# Patient Record
Sex: FEMALE | Race: BLACK OR AFRICAN AMERICAN | NOT HISPANIC OR LATINO | Employment: UNEMPLOYED | RURAL
[De-identification: names, ages, dates, MRNs, and addresses within clinical notes are randomized per-mention and may not be internally consistent; named-entity substitution may affect disease eponyms.]

---

## 2017-08-29 ENCOUNTER — HISTORICAL (OUTPATIENT)
Dept: ADMINISTRATIVE | Facility: HOSPITAL | Age: 42
End: 2017-08-29

## 2017-09-06 LAB
LAB AP CLINICAL INFORMATION: NORMAL
LAB AP COMMENTS: NORMAL
LAB AP GENERAL CAT - HISTORICAL: NORMAL
LAB AP INTERPRETATION/RESULT - HISTORICAL: NEGATIVE
LAB AP SPECIMEN ADEQUACY - HISTORICAL: NORMAL
LAB AP SPECIMEN SUBMITTED - HISTORICAL: NORMAL

## 2019-04-24 ENCOUNTER — HISTORICAL (OUTPATIENT)
Dept: ADMINISTRATIVE | Facility: HOSPITAL | Age: 44
End: 2019-04-24

## 2019-04-26 LAB
LAB AP CLINICAL INFORMATION: NORMAL
LAB AP GENERAL CAT - HISTORICAL: NORMAL
LAB AP INTERPRETATION/RESULT - HISTORICAL: NEGATIVE
LAB AP SPECIMEN ADEQUACY - HISTORICAL: NORMAL
LAB AP SPECIMEN SUBMITTED - HISTORICAL: NORMAL

## 2020-05-19 ENCOUNTER — HISTORICAL (OUTPATIENT)
Dept: ADMINISTRATIVE | Facility: HOSPITAL | Age: 45
End: 2020-05-19

## 2020-07-30 ENCOUNTER — HISTORICAL (OUTPATIENT)
Dept: ADMINISTRATIVE | Facility: HOSPITAL | Age: 45
End: 2020-07-30

## 2020-09-02 ENCOUNTER — HISTORICAL (OUTPATIENT)
Dept: ADMINISTRATIVE | Facility: HOSPITAL | Age: 45
End: 2020-09-02

## 2020-09-29 ENCOUNTER — HISTORICAL (OUTPATIENT)
Dept: ADMINISTRATIVE | Facility: HOSPITAL | Age: 45
End: 2020-09-29

## 2020-10-20 ENCOUNTER — HISTORICAL (OUTPATIENT)
Dept: ADMINISTRATIVE | Facility: HOSPITAL | Age: 45
End: 2020-10-20

## 2020-10-20 LAB
ALBUMIN SERPL BCP-MCNC: 3.9 G/DL (ref 3.5–5)
ALBUMIN/GLOB SERPL: 1 {RATIO}
ALP SERPL-CCNC: 41 U/L (ref 39–100)
ALT SERPL W P-5'-P-CCNC: 27 U/L (ref 13–56)
ANION GAP SERPL CALCULATED.3IONS-SCNC: 6 MMOL/L (ref 7–16)
AST SERPL W P-5'-P-CCNC: 23 U/L (ref 15–37)
BASOPHILS # BLD AUTO: 0.04 X10E3/UL (ref 0–0.2)
BASOPHILS NFR BLD AUTO: 0.8 % (ref 0–1)
BILIRUB SERPL-MCNC: 0.7 MG/DL (ref 0–1.2)
BUN SERPL-MCNC: 11 MG/DL (ref 7–18)
BUN/CREAT SERPL: 13
CALCIUM SERPL-MCNC: 9.2 MG/DL (ref 8.5–10.1)
CHLORIDE SERPL-SCNC: 107 MMOL/L (ref 98–107)
CHOLEST SERPL-MCNC: 162 MG/DL
CHOLEST/HDLC SERPL: 2.5 {RATIO}
CO2 SERPL-SCNC: 30 MMOL/L (ref 21–32)
CREAT SERPL-MCNC: 0.85 MG/DL (ref 0.5–1.02)
EOSINOPHIL # BLD AUTO: 0.19 X10E3/UL (ref 0–0.5)
EOSINOPHIL NFR BLD AUTO: 3.7 % (ref 1–4)
ERYTHROCYTE [DISTWIDTH] IN BLOOD BY AUTOMATED COUNT: 14.6 % (ref 11.5–14.5)
GLOBULIN SER-MCNC: 3.8 G/DL (ref 2–4)
GLUCOSE SERPL-MCNC: 95 MG/DL (ref 74–106)
HCT VFR BLD AUTO: 40.4 % (ref 38–47)
HDLC SERPL-MCNC: 66 MG/DL
HGB BLD-MCNC: 13.2 G/DL (ref 12–16)
IMM GRANULOCYTES # BLD AUTO: 0.01 X10E3/UL (ref 0–0.04)
IMM GRANULOCYTES NFR BLD: 0.2 % (ref 0–0.4)
LDLC SERPL CALC-MCNC: 83 MG/DL
LYMPHOCYTES # BLD AUTO: 2.34 X10E3/UL (ref 1–4.8)
LYMPHOCYTES NFR BLD AUTO: 45.3 % (ref 27–41)
MCH RBC QN AUTO: 29.2 PG (ref 27–31)
MCHC RBC AUTO-ENTMCNC: 32.7 G/DL (ref 32–36)
MCV RBC AUTO: 89.4 FL (ref 80–96)
MONOCYTES # BLD AUTO: 0.35 X10E3/UL (ref 0–0.8)
MONOCYTES NFR BLD AUTO: 6.8 % (ref 2–6)
MPC BLD CALC-MCNC: 12.5 FL (ref 9.4–12.4)
NEUTROPHILS # BLD AUTO: 2.23 X10E3/UL (ref 1.8–7.7)
NEUTROPHILS NFR BLD AUTO: 43.2 % (ref 53–65)
NRBC # BLD AUTO: 0 X10E3/UL (ref 0–0)
NRBC, AUTO (.00): 0 /100 (ref 0–0)
PLATELET # BLD AUTO: 230 X10E3/UL (ref 150–400)
POTASSIUM SERPL-SCNC: 4 MMOL/L (ref 3.5–5.1)
PROT SERPL-MCNC: 7.7 G/DL (ref 6.4–8.2)
RBC # BLD AUTO: 4.52 X10E6/UL (ref 4.2–5.4)
SODIUM SERPL-SCNC: 139 MMOL/L (ref 136–145)
T4 FREE SERPL-MCNC: 1.08 NG/DL (ref 0.76–1.46)
TRIGL SERPL-MCNC: 63 MG/DL
TSH SERPL DL<=0.005 MIU/L-ACNC: 5.34 UIU/ML (ref 0.36–3.74)
WBC # BLD AUTO: 5.16 X10E3/UL (ref 4.5–11)

## 2021-01-06 ENCOUNTER — HISTORICAL (OUTPATIENT)
Dept: ADMINISTRATIVE | Facility: HOSPITAL | Age: 46
End: 2021-01-06

## 2021-06-21 RX ORDER — OLMESARTAN MEDOXOMIL 5 MG/1
TABLET ORAL
COMMUNITY
Start: 2021-04-28 | End: 2021-06-23 | Stop reason: SDUPTHER

## 2021-06-21 RX ORDER — LINACLOTIDE 290 UG/1
290 CAPSULE, GELATIN COATED ORAL DAILY
COMMUNITY
Start: 2021-03-09 | End: 2021-06-23 | Stop reason: SDUPTHER

## 2021-06-21 RX ORDER — FUROSEMIDE 20 MG/1
TABLET ORAL
COMMUNITY
Start: 2021-03-12 | End: 2021-06-23 | Stop reason: SDUPTHER

## 2021-06-21 RX ORDER — CLONIDINE HYDROCHLORIDE 0.2 MG/1
TABLET ORAL
COMMUNITY
Start: 2021-03-09 | End: 2021-06-23 | Stop reason: SDUPTHER

## 2021-06-21 RX ORDER — LEVOTHYROXINE SODIUM 25 UG/1
TABLET ORAL
COMMUNITY
Start: 2021-01-23 | End: 2021-06-21 | Stop reason: SDUPTHER

## 2021-06-21 RX ORDER — CELECOXIB 200 MG/1
CAPSULE ORAL
COMMUNITY
Start: 2021-03-10 | End: 2021-06-23 | Stop reason: SDUPTHER

## 2021-06-21 RX ORDER — PRAVASTATIN SODIUM 40 MG/1
TABLET ORAL
COMMUNITY
Start: 2021-02-08 | End: 2021-06-23 | Stop reason: SDUPTHER

## 2021-06-21 RX ORDER — LEVOTHYROXINE SODIUM 25 UG/1
25 TABLET ORAL
Qty: 90 TABLET | Refills: 0 | Status: SHIPPED | OUTPATIENT
Start: 2021-06-21 | End: 2021-06-23 | Stop reason: SDUPTHER

## 2021-06-23 ENCOUNTER — OFFICE VISIT (OUTPATIENT)
Dept: FAMILY MEDICINE | Facility: CLINIC | Age: 46
End: 2021-06-23
Payer: MEDICAID

## 2021-06-23 VITALS
DIASTOLIC BLOOD PRESSURE: 79 MMHG | BODY MASS INDEX: 37.15 KG/M2 | RESPIRATION RATE: 22 BRPM | WEIGHT: 223 LBS | HEART RATE: 78 BPM | TEMPERATURE: 97 F | HEIGHT: 65 IN | SYSTOLIC BLOOD PRESSURE: 120 MMHG

## 2021-06-23 DIAGNOSIS — E78.5 HYPERLIPIDEMIA, UNSPECIFIED HYPERLIPIDEMIA TYPE: Primary | ICD-10-CM

## 2021-06-23 DIAGNOSIS — E55.9 VITAMIN D DEFICIENCY: ICD-10-CM

## 2021-06-23 DIAGNOSIS — I10 HYPERTENSION, UNSPECIFIED TYPE: ICD-10-CM

## 2021-06-23 DIAGNOSIS — E03.9 HYPOTHYROIDISM, UNSPECIFIED TYPE: ICD-10-CM

## 2021-06-23 DIAGNOSIS — M19.90 ARTHRITIS: ICD-10-CM

## 2021-06-23 DIAGNOSIS — N63.20 LEFT BREAST MASS: ICD-10-CM

## 2021-06-23 PROCEDURE — 99213 OFFICE O/P EST LOW 20 MIN: CPT | Mod: ,,, | Performed by: NURSE PRACTITIONER

## 2021-06-23 PROCEDURE — 99213 PR OFFICE/OUTPT VISIT, EST, LEVL III, 20-29 MIN: ICD-10-PCS | Mod: ,,, | Performed by: NURSE PRACTITIONER

## 2021-06-23 RX ORDER — CLONIDINE HYDROCHLORIDE 0.2 MG/1
0.2 TABLET ORAL 2 TIMES DAILY
Qty: 180 TABLET | Refills: 0 | Status: SHIPPED | OUTPATIENT
Start: 2021-06-23 | End: 2021-09-01 | Stop reason: SDUPTHER

## 2021-06-23 RX ORDER — OLMESARTAN MEDOXOMIL 5 MG/1
5 TABLET ORAL DAILY
Qty: 90 TABLET | Refills: 0 | Status: SHIPPED | OUTPATIENT
Start: 2021-06-23 | End: 2021-08-10 | Stop reason: SDUPTHER

## 2021-06-23 RX ORDER — PRAVASTATIN SODIUM 40 MG/1
40 TABLET ORAL NIGHTLY
Qty: 90 TABLET | Refills: 0 | Status: SHIPPED | OUTPATIENT
Start: 2021-06-23 | End: 2021-09-01 | Stop reason: SDUPTHER

## 2021-06-23 RX ORDER — FUROSEMIDE 20 MG/1
20 TABLET ORAL DAILY
Qty: 90 TABLET | Refills: 0 | Status: SHIPPED | OUTPATIENT
Start: 2021-06-23 | End: 2021-09-01 | Stop reason: SDUPTHER

## 2021-06-23 RX ORDER — LINACLOTIDE 290 UG/1
290 CAPSULE, GELATIN COATED ORAL DAILY
Qty: 90 CAPSULE | Refills: 0 | Status: SHIPPED | OUTPATIENT
Start: 2021-06-23 | End: 2021-09-01 | Stop reason: SDUPTHER

## 2021-06-23 RX ORDER — CELECOXIB 200 MG/1
200 CAPSULE ORAL DAILY
Qty: 90 CAPSULE | Refills: 0 | Status: SHIPPED | OUTPATIENT
Start: 2021-06-23 | End: 2021-09-01 | Stop reason: SDUPTHER

## 2021-06-23 RX ORDER — LEVOTHYROXINE SODIUM 25 UG/1
25 TABLET ORAL
Qty: 90 TABLET | Refills: 0 | Status: SHIPPED | OUTPATIENT
Start: 2021-06-23 | End: 2021-09-01 | Stop reason: SDUPTHER

## 2021-07-07 ENCOUNTER — OFFICE VISIT (OUTPATIENT)
Dept: VASCULAR SURGERY | Facility: CLINIC | Age: 46
End: 2021-07-07
Payer: MEDICAID

## 2021-07-07 ENCOUNTER — HOSPITAL ENCOUNTER (OUTPATIENT)
Dept: RADIOLOGY | Facility: HOSPITAL | Age: 46
Discharge: HOME | End: 2021-07-07
Attending: FAMILY MEDICINE
Payer: MEDICAID

## 2021-07-07 VITALS
WEIGHT: 218.38 LBS | DIASTOLIC BLOOD PRESSURE: 84 MMHG | BODY MASS INDEX: 36.39 KG/M2 | HEIGHT: 65 IN | HEART RATE: 88 BPM | RESPIRATION RATE: 16 BRPM | SYSTOLIC BLOOD PRESSURE: 144 MMHG

## 2021-07-07 DIAGNOSIS — R60.9 EDEMA, UNSPECIFIED TYPE: ICD-10-CM

## 2021-07-07 DIAGNOSIS — R60.0 EDEMA, LOWER EXTREMITY: Primary | ICD-10-CM

## 2021-07-07 DIAGNOSIS — I83.812 VARICOSE VEINS OF LEG WITH PAIN, LEFT: ICD-10-CM

## 2021-07-07 DIAGNOSIS — I87.2 VENOUS INSUFFICIENCY: ICD-10-CM

## 2021-07-07 DIAGNOSIS — R23.8 OTHER SKIN CHANGES: ICD-10-CM

## 2021-07-07 DIAGNOSIS — M79.605 LEFT LEG PAIN: ICD-10-CM

## 2021-07-07 PROCEDURE — 99214 OFFICE O/P EST MOD 30 MIN: CPT | Mod: PBBFAC,25 | Performed by: FAMILY MEDICINE

## 2021-07-07 PROCEDURE — 93971 US VENOUS REFLUX STUDY UNILATERAL: ICD-10-PCS | Mod: 26,,, | Performed by: FAMILY MEDICINE

## 2021-07-07 PROCEDURE — 93971 EXTREMITY STUDY: CPT | Mod: 26,,, | Performed by: FAMILY MEDICINE

## 2021-07-07 PROCEDURE — 99214 OFFICE O/P EST MOD 30 MIN: CPT | Mod: S$PBB,,, | Performed by: FAMILY MEDICINE

## 2021-07-07 PROCEDURE — 99214 PR OFFICE/OUTPT VISIT, EST, LEVL IV, 30-39 MIN: ICD-10-PCS | Mod: S$PBB,,, | Performed by: FAMILY MEDICINE

## 2021-07-07 PROCEDURE — 93971 EXTREMITY STUDY: CPT | Mod: TC

## 2021-07-07 RX ORDER — ASPIRIN 81 MG/1
81 TABLET ORAL DAILY
COMMUNITY
End: 2022-03-07 | Stop reason: SDUPTHER

## 2021-08-09 ENCOUNTER — OFFICE VISIT (OUTPATIENT)
Dept: VASCULAR SURGERY | Facility: CLINIC | Age: 46
End: 2021-08-09
Payer: MEDICAID

## 2021-08-09 VITALS
WEIGHT: 218.19 LBS | BODY MASS INDEX: 36.35 KG/M2 | SYSTOLIC BLOOD PRESSURE: 141 MMHG | DIASTOLIC BLOOD PRESSURE: 78 MMHG | HEIGHT: 65 IN | HEART RATE: 84 BPM

## 2021-08-09 DIAGNOSIS — I83.812 VARICOSE VEINS OF LEG WITH PAIN, LEFT: ICD-10-CM

## 2021-08-09 DIAGNOSIS — R23.8 OTHER SKIN CHANGES: ICD-10-CM

## 2021-08-09 DIAGNOSIS — R60.9 EDEMA, UNSPECIFIED TYPE: Primary | ICD-10-CM

## 2021-08-09 DIAGNOSIS — I87.2 VENOUS INSUFFICIENCY: ICD-10-CM

## 2021-08-09 PROCEDURE — 99214 OFFICE O/P EST MOD 30 MIN: CPT | Mod: PBBFAC | Performed by: FAMILY MEDICINE

## 2021-08-09 PROCEDURE — 99214 PR OFFICE/OUTPT VISIT, EST, LEVL IV, 30-39 MIN: ICD-10-PCS | Mod: S$PBB,,, | Performed by: FAMILY MEDICINE

## 2021-08-09 PROCEDURE — 99214 OFFICE O/P EST MOD 30 MIN: CPT | Mod: S$PBB,,, | Performed by: FAMILY MEDICINE

## 2021-08-09 RX ORDER — METRONIDAZOLE 500 MG/1
TABLET ORAL
COMMUNITY
Start: 2021-04-22 | End: 2022-03-07 | Stop reason: ALTCHOICE

## 2021-08-10 DIAGNOSIS — I10 HYPERTENSION, UNSPECIFIED TYPE: ICD-10-CM

## 2021-08-10 RX ORDER — OLMESARTAN MEDOXOMIL 5 MG/1
5 TABLET ORAL DAILY
Qty: 90 TABLET | Refills: 0 | Status: SHIPPED | OUTPATIENT
Start: 2021-08-10 | End: 2021-09-01 | Stop reason: SDUPTHER

## 2021-09-01 ENCOUNTER — OFFICE VISIT (OUTPATIENT)
Dept: FAMILY MEDICINE | Facility: CLINIC | Age: 46
End: 2021-09-01
Payer: MEDICAID

## 2021-09-01 VITALS
SYSTOLIC BLOOD PRESSURE: 124 MMHG | DIASTOLIC BLOOD PRESSURE: 79 MMHG | BODY MASS INDEX: 36.65 KG/M2 | HEIGHT: 65 IN | RESPIRATION RATE: 20 BRPM | TEMPERATURE: 98 F | HEART RATE: 83 BPM | WEIGHT: 220 LBS

## 2021-09-01 DIAGNOSIS — I10 HYPERTENSION, UNSPECIFIED TYPE: Primary | ICD-10-CM

## 2021-09-01 DIAGNOSIS — E78.5 HYPERLIPIDEMIA, UNSPECIFIED HYPERLIPIDEMIA TYPE: ICD-10-CM

## 2021-09-01 DIAGNOSIS — E03.9 HYPOTHYROIDISM, UNSPECIFIED TYPE: ICD-10-CM

## 2021-09-01 DIAGNOSIS — M19.90 ARTHRITIS: ICD-10-CM

## 2021-09-01 DIAGNOSIS — I87.2 VENOUS INSUFFICIENCY: ICD-10-CM

## 2021-09-01 LAB
ALBUMIN SERPL BCP-MCNC: 3.5 G/DL (ref 3.5–5)
ALBUMIN/GLOB SERPL: 0.9 {RATIO}
ALP SERPL-CCNC: 54 U/L (ref 39–100)
ALT SERPL W P-5'-P-CCNC: 38 U/L (ref 13–56)
ANION GAP SERPL CALCULATED.3IONS-SCNC: 7 MMOL/L (ref 7–16)
AST SERPL W P-5'-P-CCNC: 30 U/L (ref 15–37)
BASOPHILS # BLD AUTO: 0.07 K/UL (ref 0–0.2)
BASOPHILS NFR BLD AUTO: 1.5 % (ref 0–1)
BILIRUB SERPL-MCNC: 0.5 MG/DL (ref 0–1.2)
BUN SERPL-MCNC: 15 MG/DL (ref 7–18)
BUN/CREAT SERPL: 21 (ref 6–20)
CALCIUM SERPL-MCNC: 9.7 MG/DL (ref 8.5–10.1)
CHLORIDE SERPL-SCNC: 106 MMOL/L (ref 98–107)
CHOLEST SERPL-MCNC: 195 MG/DL (ref 0–200)
CHOLEST/HDLC SERPL: 2.7 {RATIO}
CO2 SERPL-SCNC: 29 MMOL/L (ref 21–32)
CREAT SERPL-MCNC: 0.71 MG/DL (ref 0.55–1.02)
DIFFERENTIAL METHOD BLD: ABNORMAL
EOSINOPHIL # BLD AUTO: 0.16 K/UL (ref 0–0.5)
EOSINOPHIL NFR BLD AUTO: 3.5 % (ref 1–4)
ERYTHROCYTE [DISTWIDTH] IN BLOOD BY AUTOMATED COUNT: 13.8 % (ref 11.5–14.5)
GLOBULIN SER-MCNC: 3.9 G/DL (ref 2–4)
GLUCOSE SERPL-MCNC: 84 MG/DL (ref 74–106)
HCT VFR BLD AUTO: 40.1 % (ref 38–47)
HDLC SERPL-MCNC: 71 MG/DL (ref 40–60)
HGB BLD-MCNC: 12.7 G/DL (ref 12–16)
IMM GRANULOCYTES # BLD AUTO: 0.01 K/UL (ref 0–0.04)
IMM GRANULOCYTES NFR BLD: 0.2 % (ref 0–0.4)
LDLC SERPL CALC-MCNC: 110 MG/DL
LDLC/HDLC SERPL: 1.5 {RATIO}
LYMPHOCYTES # BLD AUTO: 2.17 K/UL (ref 1–4.8)
LYMPHOCYTES NFR BLD AUTO: 47.1 % (ref 27–41)
MCH RBC QN AUTO: 28.4 PG (ref 27–31)
MCHC RBC AUTO-ENTMCNC: 31.7 G/DL (ref 32–36)
MCV RBC AUTO: 89.7 FL (ref 80–96)
MONOCYTES # BLD AUTO: 0.35 K/UL (ref 0–0.8)
MONOCYTES NFR BLD AUTO: 7.6 % (ref 2–6)
MPC BLD CALC-MCNC: 11.6 FL (ref 9.4–12.4)
NEUTROPHILS # BLD AUTO: 1.85 K/UL (ref 1.8–7.7)
NEUTROPHILS NFR BLD AUTO: 40.1 % (ref 53–65)
NONHDLC SERPL-MCNC: 124 MG/DL
NRBC # BLD AUTO: 0 X10E3/UL
NRBC, AUTO (.00): 0 %
PLATELET # BLD AUTO: 205 K/UL (ref 150–400)
POTASSIUM SERPL-SCNC: 4.4 MMOL/L (ref 3.5–5.1)
PROT SERPL-MCNC: 7.4 G/DL (ref 6.4–8.2)
RBC # BLD AUTO: 4.47 M/UL (ref 4.2–5.4)
SODIUM SERPL-SCNC: 138 MMOL/L (ref 136–145)
T4 FREE SERPL-MCNC: 1.01 NG/DL (ref 0.76–1.46)
TRIGL SERPL-MCNC: 69 MG/DL (ref 35–150)
TSH SERPL DL<=0.005 MIU/L-ACNC: 1.91 UIU/ML (ref 0.36–3.74)
VLDLC SERPL-MCNC: 14 MG/DL
WBC # BLD AUTO: 4.61 K/UL (ref 4.5–11)

## 2021-09-01 PROCEDURE — 85025 COMPLETE CBC W/AUTO DIFF WBC: CPT | Mod: ,,, | Performed by: CLINICAL MEDICAL LABORATORY

## 2021-09-01 PROCEDURE — 80061 LIPID PANEL: ICD-10-PCS | Mod: ,,, | Performed by: CLINICAL MEDICAL LABORATORY

## 2021-09-01 PROCEDURE — 99214 OFFICE O/P EST MOD 30 MIN: CPT | Mod: ,,, | Performed by: NURSE PRACTITIONER

## 2021-09-01 PROCEDURE — 85025 CBC WITH DIFFERENTIAL: ICD-10-PCS | Mod: ,,, | Performed by: CLINICAL MEDICAL LABORATORY

## 2021-09-01 PROCEDURE — 99214 PR OFFICE/OUTPT VISIT, EST, LEVL IV, 30-39 MIN: ICD-10-PCS | Mod: ,,, | Performed by: NURSE PRACTITIONER

## 2021-09-01 PROCEDURE — 80053 COMPREHEN METABOLIC PANEL: CPT | Mod: ,,, | Performed by: CLINICAL MEDICAL LABORATORY

## 2021-09-01 PROCEDURE — 84443 THYROID PANEL: ICD-10-PCS | Mod: ,,, | Performed by: CLINICAL MEDICAL LABORATORY

## 2021-09-01 PROCEDURE — 80061 LIPID PANEL: CPT | Mod: ,,, | Performed by: CLINICAL MEDICAL LABORATORY

## 2021-09-01 PROCEDURE — 80053 COMPREHENSIVE METABOLIC PANEL: ICD-10-PCS | Mod: ,,, | Performed by: CLINICAL MEDICAL LABORATORY

## 2021-09-01 PROCEDURE — 84439 ASSAY OF FREE THYROXINE: CPT | Mod: ,,, | Performed by: CLINICAL MEDICAL LABORATORY

## 2021-09-01 PROCEDURE — 84439 THYROID PANEL: ICD-10-PCS | Mod: ,,, | Performed by: CLINICAL MEDICAL LABORATORY

## 2021-09-01 PROCEDURE — 84443 ASSAY THYROID STIM HORMONE: CPT | Mod: ,,, | Performed by: CLINICAL MEDICAL LABORATORY

## 2021-09-01 RX ORDER — CELECOXIB 200 MG/1
200 CAPSULE ORAL DAILY
Qty: 90 CAPSULE | Refills: 0 | Status: SHIPPED | OUTPATIENT
Start: 2021-09-01 | End: 2021-12-06 | Stop reason: SDUPTHER

## 2021-09-01 RX ORDER — CLONIDINE HYDROCHLORIDE 0.2 MG/1
0.2 TABLET ORAL 2 TIMES DAILY
Qty: 180 TABLET | Refills: 0 | Status: SHIPPED | OUTPATIENT
Start: 2021-09-01 | End: 2021-12-06 | Stop reason: SDUPTHER

## 2021-09-01 RX ORDER — FUROSEMIDE 20 MG/1
20 TABLET ORAL DAILY
Qty: 90 TABLET | Refills: 0 | Status: SHIPPED | OUTPATIENT
Start: 2021-09-01 | End: 2021-11-16 | Stop reason: SDUPTHER

## 2021-09-01 RX ORDER — LEVOTHYROXINE SODIUM 25 UG/1
25 TABLET ORAL
Qty: 90 TABLET | Refills: 0 | Status: SHIPPED | OUTPATIENT
Start: 2021-09-01 | End: 2021-12-06 | Stop reason: SDUPTHER

## 2021-09-01 RX ORDER — LINACLOTIDE 290 UG/1
290 CAPSULE, GELATIN COATED ORAL DAILY
Qty: 90 CAPSULE | Refills: 0 | Status: SHIPPED | OUTPATIENT
Start: 2021-09-01 | End: 2021-12-06 | Stop reason: SDUPTHER

## 2021-09-01 RX ORDER — OLMESARTAN MEDOXOMIL 5 MG/1
5 TABLET ORAL DAILY
Qty: 90 TABLET | Refills: 0 | Status: SHIPPED | OUTPATIENT
Start: 2021-09-01 | End: 2021-12-06 | Stop reason: SDUPTHER

## 2021-09-01 RX ORDER — PRAVASTATIN SODIUM 40 MG/1
40 TABLET ORAL NIGHTLY
Qty: 90 TABLET | Refills: 0 | Status: SHIPPED | OUTPATIENT
Start: 2021-09-01 | End: 2021-12-06 | Stop reason: SDUPTHER

## 2021-09-07 DIAGNOSIS — I87.2 VENOUS INSUFFICIENCY: Primary | ICD-10-CM

## 2021-09-07 DIAGNOSIS — I87.2 VENOUS INSUFFICIENCY (CHRONIC) (PERIPHERAL): ICD-10-CM

## 2021-09-07 RX ORDER — SODIUM CHLORIDE, SODIUM LACTATE, POTASSIUM CHLORIDE, CALCIUM CHLORIDE 600; 310; 30; 20 MG/100ML; MG/100ML; MG/100ML; MG/100ML
INJECTION, SOLUTION INTRAVENOUS CONTINUOUS
Status: CANCELLED | OUTPATIENT
Start: 2021-09-10

## 2021-09-08 ENCOUNTER — OFFICE VISIT (OUTPATIENT)
Dept: VASCULAR SURGERY | Facility: CLINIC | Age: 46
End: 2021-09-08
Payer: MEDICAID

## 2021-09-08 ENCOUNTER — HOSPITAL ENCOUNTER (OUTPATIENT)
Dept: RADIOLOGY | Facility: HOSPITAL | Age: 46
Discharge: HOME | End: 2021-09-08
Payer: MEDICAID

## 2021-09-08 VITALS
WEIGHT: 220 LBS | DIASTOLIC BLOOD PRESSURE: 86 MMHG | BODY MASS INDEX: 36.65 KG/M2 | SYSTOLIC BLOOD PRESSURE: 142 MMHG | HEIGHT: 65 IN | HEART RATE: 88 BPM | RESPIRATION RATE: 16 BRPM

## 2021-09-08 DIAGNOSIS — I87.2 VENOUS INSUFFICIENCY: Primary | ICD-10-CM

## 2021-09-08 DIAGNOSIS — R23.8 OTHER SKIN CHANGES: ICD-10-CM

## 2021-09-08 DIAGNOSIS — I83.812 VARICOSE VEINS OF LEG WITH PAIN, LEFT: ICD-10-CM

## 2021-09-08 DIAGNOSIS — Z01.818 PREOPERATIVE CLEARANCE: ICD-10-CM

## 2021-09-08 DIAGNOSIS — Z12.31 VISIT FOR SCREENING MAMMOGRAM: ICD-10-CM

## 2021-09-08 DIAGNOSIS — R60.0 EDEMA, LOWER EXTREMITY: ICD-10-CM

## 2021-09-08 PROCEDURE — 99214 PR OFFICE/OUTPT VISIT, EST, LEVL IV, 30-39 MIN: ICD-10-PCS | Mod: S$PBB,,, | Performed by: NURSE PRACTITIONER

## 2021-09-08 PROCEDURE — 77067 SCR MAMMO BI INCL CAD: CPT | Mod: TC

## 2021-09-08 PROCEDURE — 99214 OFFICE O/P EST MOD 30 MIN: CPT | Mod: PBBFAC | Performed by: NURSE PRACTITIONER

## 2021-09-08 PROCEDURE — 99214 OFFICE O/P EST MOD 30 MIN: CPT | Mod: S$PBB,,, | Performed by: NURSE PRACTITIONER

## 2021-09-10 ENCOUNTER — HOSPITAL ENCOUNTER (OUTPATIENT)
Facility: HOSPITAL | Age: 46
Discharge: HOME | End: 2021-09-10
Attending: FAMILY MEDICINE | Admitting: FAMILY MEDICINE
Payer: MEDICAID

## 2021-09-10 ENCOUNTER — ANESTHESIA EVENT (OUTPATIENT)
Dept: SURGERY | Facility: HOSPITAL | Age: 46
End: 2021-09-10
Payer: MEDICAID

## 2021-09-10 ENCOUNTER — ANESTHESIA (OUTPATIENT)
Dept: SURGERY | Facility: HOSPITAL | Age: 46
End: 2021-09-10
Payer: MEDICAID

## 2021-09-10 VITALS
BODY MASS INDEX: 35.99 KG/M2 | DIASTOLIC BLOOD PRESSURE: 82 MMHG | HEART RATE: 68 BPM | TEMPERATURE: 98 F | OXYGEN SATURATION: 100 % | HEIGHT: 65 IN | RESPIRATION RATE: 14 BRPM | WEIGHT: 216 LBS | SYSTOLIC BLOOD PRESSURE: 148 MMHG

## 2021-09-10 VITALS
OXYGEN SATURATION: 100 % | SYSTOLIC BLOOD PRESSURE: 117 MMHG | HEART RATE: 89 BPM | RESPIRATION RATE: 15 BRPM | DIASTOLIC BLOOD PRESSURE: 65 MMHG

## 2021-09-10 DIAGNOSIS — I87.2 VENOUS INSUFFICIENCY (CHRONIC) (PERIPHERAL): Primary | ICD-10-CM

## 2021-09-10 DIAGNOSIS — I87.2 VENOUS INSUFFICIENCY: ICD-10-CM

## 2021-09-10 PROCEDURE — 27000716 HC OXISENSOR PROBE, ANY SIZE: Performed by: ANESTHESIOLOGY

## 2021-09-10 PROCEDURE — 27201423 OPTIME MED/SURG SUP & DEVICES STERILE SUPPLY: Performed by: FAMILY MEDICINE

## 2021-09-10 PROCEDURE — D9220A PRA ANESTHESIA: ICD-10-PCS | Mod: CRNA,,, | Performed by: NURSE ANESTHETIST, CERTIFIED REGISTERED

## 2021-09-10 PROCEDURE — D9220A PRA ANESTHESIA: Mod: ANES,,, | Performed by: ANESTHESIOLOGY

## 2021-09-10 PROCEDURE — 37000008 HC ANESTHESIA 1ST 15 MINUTES: Performed by: FAMILY MEDICINE

## 2021-09-10 PROCEDURE — 27000284 HC CANNULA NASAL: Performed by: ANESTHESIOLOGY

## 2021-09-10 PROCEDURE — 27000716 HC OXISENSOR PROBE, ANY SIZE: Performed by: NURSE ANESTHETIST, CERTIFIED REGISTERED

## 2021-09-10 PROCEDURE — D9220A PRA ANESTHESIA: ICD-10-PCS | Mod: ANES,,, | Performed by: ANESTHESIOLOGY

## 2021-09-10 PROCEDURE — 25000003 PHARM REV CODE 250: Performed by: NURSE ANESTHETIST, CERTIFIED REGISTERED

## 2021-09-10 PROCEDURE — 25000003 PHARM REV CODE 250: Performed by: FAMILY MEDICINE

## 2021-09-10 PROCEDURE — 71000015 HC POSTOP RECOV 1ST HR: Performed by: FAMILY MEDICINE

## 2021-09-10 PROCEDURE — 27000284 HC CANNULA NASAL: Performed by: NURSE ANESTHETIST, CERTIFIED REGISTERED

## 2021-09-10 PROCEDURE — 71000033 HC RECOVERY, INTIAL HOUR: Performed by: FAMILY MEDICINE

## 2021-09-10 PROCEDURE — 37000009 HC ANESTHESIA EA ADD 15 MINS: Performed by: FAMILY MEDICINE

## 2021-09-10 PROCEDURE — D9220A PRA ANESTHESIA: Mod: CRNA,,, | Performed by: NURSE ANESTHETIST, CERTIFIED REGISTERED

## 2021-09-10 PROCEDURE — 36000706: Performed by: FAMILY MEDICINE

## 2021-09-10 PROCEDURE — 63600175 PHARM REV CODE 636 W HCPCS: Performed by: ANESTHESIOLOGY

## 2021-09-10 PROCEDURE — 36478 PR ENDOVENOUS LASER, 1ST VEIN: ICD-10-PCS | Mod: LT,,, | Performed by: FAMILY MEDICINE

## 2021-09-10 PROCEDURE — 63600175 PHARM REV CODE 636 W HCPCS: Performed by: NURSE ANESTHETIST, CERTIFIED REGISTERED

## 2021-09-10 PROCEDURE — 71000016 HC POSTOP RECOV ADDL HR: Performed by: FAMILY MEDICINE

## 2021-09-10 PROCEDURE — 36478 ENDOVENOUS LASER 1ST VEIN: CPT | Mod: LT,,, | Performed by: FAMILY MEDICINE

## 2021-09-10 PROCEDURE — 36000707: Performed by: FAMILY MEDICINE

## 2021-09-10 RX ORDER — LIDOCAINE HYDROCHLORIDE 20 MG/ML
INJECTION, SOLUTION EPIDURAL; INFILTRATION; INTRACAUDAL; PERINEURAL
Status: DISCONTINUED | OUTPATIENT
Start: 2021-09-10 | End: 2021-09-10

## 2021-09-10 RX ORDER — DIPHENHYDRAMINE HYDROCHLORIDE 50 MG/ML
25 INJECTION INTRAMUSCULAR; INTRAVENOUS EVERY 6 HOURS PRN
Status: DISCONTINUED | OUTPATIENT
Start: 2021-09-10 | End: 2021-09-10 | Stop reason: HOSPADM

## 2021-09-10 RX ORDER — SODIUM CHLORIDE, SODIUM LACTATE, POTASSIUM CHLORIDE, CALCIUM CHLORIDE 600; 310; 30; 20 MG/100ML; MG/100ML; MG/100ML; MG/100ML
INJECTION, SOLUTION INTRAVENOUS CONTINUOUS
Status: DISCONTINUED | OUTPATIENT
Start: 2021-09-10 | End: 2021-09-10 | Stop reason: HOSPADM

## 2021-09-10 RX ORDER — SODIUM CHLORIDE, SODIUM LACTATE, POTASSIUM CHLORIDE, CALCIUM CHLORIDE 600; 310; 30; 20 MG/100ML; MG/100ML; MG/100ML; MG/100ML
125 INJECTION, SOLUTION INTRAVENOUS CONTINUOUS
Status: DISCONTINUED | OUTPATIENT
Start: 2021-09-10 | End: 2021-09-10 | Stop reason: HOSPADM

## 2021-09-10 RX ORDER — ONDANSETRON 2 MG/ML
4 INJECTION INTRAMUSCULAR; INTRAVENOUS DAILY PRN
Status: DISCONTINUED | OUTPATIENT
Start: 2021-09-10 | End: 2021-09-10 | Stop reason: HOSPADM

## 2021-09-10 RX ORDER — GLYCOPYRROLATE 0.2 MG/ML
INJECTION INTRAMUSCULAR; INTRAVENOUS
Status: DISCONTINUED | OUTPATIENT
Start: 2021-09-10 | End: 2021-09-10

## 2021-09-10 RX ORDER — PROPOFOL 10 MG/ML
VIAL (ML) INTRAVENOUS
Status: DISCONTINUED | OUTPATIENT
Start: 2021-09-10 | End: 2021-09-10

## 2021-09-10 RX ADMIN — PROPOFOL 50 MG: 10 INJECTION, EMULSION INTRAVENOUS at 09:09

## 2021-09-10 RX ADMIN — SODIUM CHLORIDE, POTASSIUM CHLORIDE, SODIUM LACTATE AND CALCIUM CHLORIDE: 600; 310; 30; 20 INJECTION, SOLUTION INTRAVENOUS at 07:09

## 2021-09-10 RX ADMIN — LIDOCAINE HYDROCHLORIDE,EPINEPHRINE BITARTRATE: 10; .01 INJECTION, SOLUTION INFILTRATION; PERINEURAL at 09:09

## 2021-09-10 RX ADMIN — PROPOFOL 60 MG: 10 INJECTION, EMULSION INTRAVENOUS at 08:09

## 2021-09-10 RX ADMIN — SODIUM CHLORIDE: 9 INJECTION, SOLUTION INTRAVENOUS at 08:09

## 2021-09-10 RX ADMIN — GLYCOPYRROLATE 0.2 MG: 0.2 INJECTION INTRAMUSCULAR; INTRAVENOUS at 08:09

## 2021-09-10 RX ADMIN — PROPOFOL 60 MG: 10 INJECTION, EMULSION INTRAVENOUS at 09:09

## 2021-09-10 RX ADMIN — LIDOCAINE HYDROCHLORIDE 70 MG: 20 INJECTION, SOLUTION INTRAVENOUS at 08:09

## 2021-09-13 ENCOUNTER — HOSPITAL ENCOUNTER (OUTPATIENT)
Dept: RADIOLOGY | Facility: HOSPITAL | Age: 46
Discharge: HOME | End: 2021-09-13
Attending: FAMILY MEDICINE
Payer: MEDICAID

## 2021-09-13 ENCOUNTER — OFFICE VISIT (OUTPATIENT)
Dept: VASCULAR SURGERY | Facility: CLINIC | Age: 46
End: 2021-09-13
Payer: MEDICAID

## 2021-09-13 VITALS
RESPIRATION RATE: 20 BRPM | HEIGHT: 65 IN | WEIGHT: 218 LBS | SYSTOLIC BLOOD PRESSURE: 124 MMHG | BODY MASS INDEX: 36.32 KG/M2 | HEART RATE: 80 BPM | DIASTOLIC BLOOD PRESSURE: 70 MMHG

## 2021-09-13 DIAGNOSIS — I87.2 VENOUS INSUFFICIENCY: Primary | ICD-10-CM

## 2021-09-13 DIAGNOSIS — R60.0 EDEMA, LOWER EXTREMITY: ICD-10-CM

## 2021-09-13 DIAGNOSIS — I83.812 VARICOSE VEINS OF LEG WITH PAIN, LEFT: ICD-10-CM

## 2021-09-13 DIAGNOSIS — M79.605 LEFT LEG PAIN: ICD-10-CM

## 2021-09-13 DIAGNOSIS — I87.2 VENOUS INSUFFICIENCY (CHRONIC) (PERIPHERAL): ICD-10-CM

## 2021-09-13 PROCEDURE — 93971 US POST ABLATION VENOUS: ICD-10-PCS | Mod: 26,,, | Performed by: FAMILY MEDICINE

## 2021-09-13 PROCEDURE — 93971 EXTREMITY STUDY: CPT | Mod: 26,,, | Performed by: FAMILY MEDICINE

## 2021-09-13 PROCEDURE — 99214 PR OFFICE/OUTPT VISIT, EST, LEVL IV, 30-39 MIN: ICD-10-PCS | Mod: S$PBB,,, | Performed by: NURSE PRACTITIONER

## 2021-09-13 PROCEDURE — 99214 OFFICE O/P EST MOD 30 MIN: CPT | Mod: PBBFAC,25 | Performed by: NURSE PRACTITIONER

## 2021-09-13 PROCEDURE — 93971 EXTREMITY STUDY: CPT | Mod: TC

## 2021-09-13 PROCEDURE — 99214 OFFICE O/P EST MOD 30 MIN: CPT | Mod: S$PBB,,, | Performed by: NURSE PRACTITIONER

## 2021-10-08 ENCOUNTER — OFFICE VISIT (OUTPATIENT)
Dept: CARDIOLOGY | Facility: CLINIC | Age: 46
End: 2021-10-08
Payer: MEDICAID

## 2021-10-08 VITALS
SYSTOLIC BLOOD PRESSURE: 130 MMHG | HEART RATE: 72 BPM | WEIGHT: 219 LBS | DIASTOLIC BLOOD PRESSURE: 85 MMHG | RESPIRATION RATE: 18 BRPM | HEIGHT: 65 IN | BODY MASS INDEX: 36.49 KG/M2

## 2021-10-08 DIAGNOSIS — I10 HYPERTENSION, UNSPECIFIED TYPE: Primary | ICD-10-CM

## 2021-10-08 DIAGNOSIS — I87.2 VENOUS INSUFFICIENCY: ICD-10-CM

## 2021-10-08 DIAGNOSIS — I10 HYPERTENSION, UNSPECIFIED TYPE: ICD-10-CM

## 2021-10-08 DIAGNOSIS — E78.5 HYPERLIPIDEMIA, UNSPECIFIED HYPERLIPIDEMIA TYPE: ICD-10-CM

## 2021-10-08 PROCEDURE — 93005 ELECTROCARDIOGRAM TRACING: CPT | Mod: PBBFAC | Performed by: STUDENT IN AN ORGANIZED HEALTH CARE EDUCATION/TRAINING PROGRAM

## 2021-10-08 PROCEDURE — 93010 ELECTROCARDIOGRAM REPORT: CPT | Mod: S$PBB,,, | Performed by: STUDENT IN AN ORGANIZED HEALTH CARE EDUCATION/TRAINING PROGRAM

## 2021-10-08 PROCEDURE — 99214 OFFICE O/P EST MOD 30 MIN: CPT | Mod: S$PBB,,, | Performed by: STUDENT IN AN ORGANIZED HEALTH CARE EDUCATION/TRAINING PROGRAM

## 2021-10-08 PROCEDURE — 99214 OFFICE O/P EST MOD 30 MIN: CPT | Mod: PBBFAC | Performed by: STUDENT IN AN ORGANIZED HEALTH CARE EDUCATION/TRAINING PROGRAM

## 2021-10-08 PROCEDURE — 99214 PR OFFICE/OUTPT VISIT, EST, LEVL IV, 30-39 MIN: ICD-10-PCS | Mod: S$PBB,,, | Performed by: STUDENT IN AN ORGANIZED HEALTH CARE EDUCATION/TRAINING PROGRAM

## 2021-10-08 PROCEDURE — 93010 EKG 12-LEAD: ICD-10-PCS | Mod: S$PBB,,, | Performed by: STUDENT IN AN ORGANIZED HEALTH CARE EDUCATION/TRAINING PROGRAM

## 2021-10-13 ENCOUNTER — OFFICE VISIT (OUTPATIENT)
Dept: VASCULAR SURGERY | Facility: CLINIC | Age: 46
End: 2021-10-13
Payer: MEDICAID

## 2021-10-13 VITALS
BODY MASS INDEX: 36.43 KG/M2 | HEIGHT: 65 IN | WEIGHT: 218.63 LBS | RESPIRATION RATE: 18 BRPM | DIASTOLIC BLOOD PRESSURE: 66 MMHG | SYSTOLIC BLOOD PRESSURE: 130 MMHG | HEART RATE: 76 BPM

## 2021-10-13 DIAGNOSIS — M79.605 LEFT LEG PAIN: ICD-10-CM

## 2021-10-13 DIAGNOSIS — I83.812 VARICOSE VEINS OF LEG WITH PAIN, LEFT: Primary | ICD-10-CM

## 2021-10-13 DIAGNOSIS — I87.2 VENOUS INSUFFICIENCY: ICD-10-CM

## 2021-10-13 PROCEDURE — 99214 OFFICE O/P EST MOD 30 MIN: CPT | Mod: PBBFAC | Performed by: FAMILY MEDICINE

## 2021-10-13 PROCEDURE — 99214 PR OFFICE/OUTPT VISIT, EST, LEVL IV, 30-39 MIN: ICD-10-PCS | Mod: S$PBB,,, | Performed by: FAMILY MEDICINE

## 2021-10-13 PROCEDURE — 99214 OFFICE O/P EST MOD 30 MIN: CPT | Mod: S$PBB,,, | Performed by: FAMILY MEDICINE

## 2021-11-16 ENCOUNTER — OFFICE VISIT (OUTPATIENT)
Dept: FAMILY MEDICINE | Facility: CLINIC | Age: 46
End: 2021-11-16
Payer: MEDICAID

## 2021-11-16 VITALS
DIASTOLIC BLOOD PRESSURE: 74 MMHG | SYSTOLIC BLOOD PRESSURE: 132 MMHG | HEART RATE: 71 BPM | OXYGEN SATURATION: 100 % | BODY MASS INDEX: 36.96 KG/M2 | WEIGHT: 221.81 LBS | HEIGHT: 65 IN

## 2021-11-16 DIAGNOSIS — R10.9 LEFT FLANK PAIN: Primary | ICD-10-CM

## 2021-11-16 DIAGNOSIS — R07.81 RIB PAIN ON LEFT SIDE: ICD-10-CM

## 2021-11-16 DIAGNOSIS — I10 HYPERTENSION, UNSPECIFIED TYPE: ICD-10-CM

## 2021-11-16 DIAGNOSIS — S29.011A MUSCLE STRAIN OF CHEST WALL, INITIAL ENCOUNTER: ICD-10-CM

## 2021-11-16 LAB
ALBUMIN SERPL BCP-MCNC: 3.6 G/DL (ref 3.5–5)
ALBUMIN/GLOB SERPL: 1 {RATIO}
ALP SERPL-CCNC: 49 U/L (ref 39–100)
ALT SERPL W P-5'-P-CCNC: 29 U/L (ref 13–56)
ANION GAP SERPL CALCULATED.3IONS-SCNC: 10 MMOL/L (ref 7–16)
AST SERPL W P-5'-P-CCNC: 20 U/L (ref 15–37)
BASOPHILS # BLD AUTO: 0.05 K/UL (ref 0–0.2)
BASOPHILS NFR BLD AUTO: 1.1 % (ref 0–1)
BILIRUB SERPL-MCNC: 0.5 MG/DL (ref 0–1.2)
BUN SERPL-MCNC: 14 MG/DL (ref 7–18)
BUN/CREAT SERPL: 18 (ref 6–20)
CALCIUM SERPL-MCNC: 9.3 MG/DL (ref 8.5–10.1)
CHLORIDE SERPL-SCNC: 105 MMOL/L (ref 98–107)
CO2 SERPL-SCNC: 28 MMOL/L (ref 21–32)
CREAT SERPL-MCNC: 0.8 MG/DL (ref 0.55–1.02)
DIFFERENTIAL METHOD BLD: ABNORMAL
EOSINOPHIL # BLD AUTO: 0.12 K/UL (ref 0–0.5)
EOSINOPHIL NFR BLD AUTO: 2.7 % (ref 1–4)
ERYTHROCYTE [DISTWIDTH] IN BLOOD BY AUTOMATED COUNT: 14.1 % (ref 11.5–14.5)
GLOBULIN SER-MCNC: 3.6 G/DL (ref 2–4)
GLUCOSE SERPL-MCNC: 90 MG/DL (ref 74–106)
HCT VFR BLD AUTO: 40.4 % (ref 38–47)
HGB BLD-MCNC: 13.2 G/DL (ref 12–16)
IMM GRANULOCYTES # BLD AUTO: 0 K/UL (ref 0–0.04)
IMM GRANULOCYTES NFR BLD: 0 % (ref 0–0.4)
LYMPHOCYTES # BLD AUTO: 2.09 K/UL (ref 1–4.8)
LYMPHOCYTES NFR BLD AUTO: 47.3 % (ref 27–41)
MCH RBC QN AUTO: 29.5 PG (ref 27–31)
MCHC RBC AUTO-ENTMCNC: 32.7 G/DL (ref 32–36)
MCV RBC AUTO: 90.4 FL (ref 80–96)
MONOCYTES # BLD AUTO: 0.3 K/UL (ref 0–0.8)
MONOCYTES NFR BLD AUTO: 6.8 % (ref 2–6)
MPC BLD CALC-MCNC: 11.8 FL (ref 9.4–12.4)
NEUTROPHILS # BLD AUTO: 1.86 K/UL (ref 1.8–7.7)
NEUTROPHILS NFR BLD AUTO: 42.1 % (ref 53–65)
NRBC # BLD AUTO: 0 X10E3/UL
NRBC, AUTO (.00): 0 %
PLATELET # BLD AUTO: 228 K/UL (ref 150–400)
POTASSIUM SERPL-SCNC: 4.3 MMOL/L (ref 3.5–5.1)
PROT SERPL-MCNC: 7.2 G/DL (ref 6.4–8.2)
RBC # BLD AUTO: 4.47 M/UL (ref 4.2–5.4)
SODIUM SERPL-SCNC: 139 MMOL/L (ref 136–145)
WBC # BLD AUTO: 4.42 K/UL (ref 4.5–11)

## 2021-11-16 PROCEDURE — 85025 CBC WITH DIFFERENTIAL: ICD-10-PCS | Mod: ,,, | Performed by: CLINICAL MEDICAL LABORATORY

## 2021-11-16 PROCEDURE — 80053 COMPREHEN METABOLIC PANEL: CPT | Mod: ,,, | Performed by: CLINICAL MEDICAL LABORATORY

## 2021-11-16 PROCEDURE — 99213 OFFICE O/P EST LOW 20 MIN: CPT | Mod: 25,,, | Performed by: NURSE PRACTITIONER

## 2021-11-16 PROCEDURE — 96372 THER/PROPH/DIAG INJ SC/IM: CPT | Mod: ,,, | Performed by: NURSE PRACTITIONER

## 2021-11-16 PROCEDURE — 87086 URINE CULTURE/COLONY COUNT: CPT | Mod: ,,, | Performed by: CLINICAL MEDICAL LABORATORY

## 2021-11-16 PROCEDURE — 80053 COMPREHENSIVE METABOLIC PANEL: ICD-10-PCS | Mod: ,,, | Performed by: CLINICAL MEDICAL LABORATORY

## 2021-11-16 PROCEDURE — 87086 CULTURE, URINE: ICD-10-PCS | Mod: ,,, | Performed by: CLINICAL MEDICAL LABORATORY

## 2021-11-16 PROCEDURE — 96372 PR INJECTION,THERAP/PROPH/DIAG2ST, IM OR SUBCUT: ICD-10-PCS | Mod: ,,, | Performed by: NURSE PRACTITIONER

## 2021-11-16 PROCEDURE — 99213 PR OFFICE/OUTPT VISIT, EST, LEVL III, 20-29 MIN: ICD-10-PCS | Mod: 25,,, | Performed by: NURSE PRACTITIONER

## 2021-11-16 PROCEDURE — 85025 COMPLETE CBC W/AUTO DIFF WBC: CPT | Mod: ,,, | Performed by: CLINICAL MEDICAL LABORATORY

## 2021-11-16 RX ORDER — KETOROLAC TROMETHAMINE 30 MG/ML
60 INJECTION, SOLUTION INTRAMUSCULAR; INTRAVENOUS
Status: COMPLETED | OUTPATIENT
Start: 2021-11-16 | End: 2021-11-16

## 2021-11-16 RX ORDER — IBUPROFEN 800 MG/1
800 TABLET ORAL EVERY 6 HOURS PRN
Qty: 30 TABLET | Refills: 0 | Status: SHIPPED | OUTPATIENT
Start: 2021-11-16 | End: 2021-12-06 | Stop reason: SDUPTHER

## 2021-11-16 RX ORDER — TIZANIDINE 4 MG/1
4 TABLET ORAL EVERY 8 HOURS
Qty: 20 TABLET | Refills: 0 | Status: SHIPPED | OUTPATIENT
Start: 2021-11-16 | End: 2022-03-07 | Stop reason: SDUPTHER

## 2021-11-16 RX ORDER — BETAMETHASONE SODIUM PHOSPHATE AND BETAMETHASONE ACETATE 3; 3 MG/ML; MG/ML
6 INJECTION, SUSPENSION INTRA-ARTICULAR; INTRALESIONAL; INTRAMUSCULAR; SOFT TISSUE
Status: COMPLETED | OUTPATIENT
Start: 2021-11-16 | End: 2021-11-16

## 2021-11-16 RX ORDER — FUROSEMIDE 20 MG/1
20 TABLET ORAL DAILY
Qty: 90 TABLET | Refills: 1 | Status: SHIPPED | OUTPATIENT
Start: 2021-11-16 | End: 2021-11-16 | Stop reason: SDUPTHER

## 2021-11-16 RX ORDER — FUROSEMIDE 20 MG/1
20 TABLET ORAL DAILY
Qty: 90 TABLET | Refills: 1 | Status: SHIPPED | OUTPATIENT
Start: 2021-11-16 | End: 2021-12-06 | Stop reason: SDUPTHER

## 2021-11-16 RX ORDER — METHYLPREDNISOLONE 4 MG/1
TABLET ORAL
Qty: 21 TABLET | Refills: 0 | Status: SHIPPED | OUTPATIENT
Start: 2021-11-16 | End: 2022-03-07 | Stop reason: ALTCHOICE

## 2021-11-16 RX ADMIN — BETAMETHASONE SODIUM PHOSPHATE AND BETAMETHASONE ACETATE 6 MG: 3; 3 INJECTION, SUSPENSION INTRA-ARTICULAR; INTRALESIONAL; INTRAMUSCULAR; SOFT TISSUE at 11:11

## 2021-11-16 RX ADMIN — KETOROLAC TROMETHAMINE 60 MG: 30 INJECTION, SOLUTION INTRAMUSCULAR; INTRAVENOUS at 11:11

## 2021-11-18 LAB — UA COMPLETE W REFLEX CULTURE PNL UR: NO GROWTH

## 2021-12-06 ENCOUNTER — OFFICE VISIT (OUTPATIENT)
Dept: FAMILY MEDICINE | Facility: CLINIC | Age: 46
End: 2021-12-06
Payer: MEDICAID

## 2021-12-06 VITALS
WEIGHT: 220.19 LBS | HEIGHT: 65 IN | SYSTOLIC BLOOD PRESSURE: 116 MMHG | DIASTOLIC BLOOD PRESSURE: 79 MMHG | BODY MASS INDEX: 36.69 KG/M2 | OXYGEN SATURATION: 98 % | HEART RATE: 84 BPM

## 2021-12-06 DIAGNOSIS — K59.00 CONSTIPATION, UNSPECIFIED CONSTIPATION TYPE: ICD-10-CM

## 2021-12-06 DIAGNOSIS — R73.9 HYPERGLYCEMIA: ICD-10-CM

## 2021-12-06 DIAGNOSIS — E78.5 HYPERLIPIDEMIA, UNSPECIFIED HYPERLIPIDEMIA TYPE: ICD-10-CM

## 2021-12-06 DIAGNOSIS — M19.90 ARTHRITIS: ICD-10-CM

## 2021-12-06 DIAGNOSIS — E03.9 HYPOTHYROIDISM, UNSPECIFIED TYPE: ICD-10-CM

## 2021-12-06 DIAGNOSIS — Z11.59 SCREENING FOR VIRAL DISEASE: ICD-10-CM

## 2021-12-06 DIAGNOSIS — I87.2 VENOUS INSUFFICIENCY: ICD-10-CM

## 2021-12-06 DIAGNOSIS — I10 PRIMARY HYPERTENSION: Primary | ICD-10-CM

## 2021-12-06 DIAGNOSIS — I10 HYPERTENSION, UNSPECIFIED TYPE: ICD-10-CM

## 2021-12-06 LAB
ALBUMIN SERPL BCP-MCNC: 3.7 G/DL (ref 3.5–5)
ALBUMIN/GLOB SERPL: 1 {RATIO}
ALP SERPL-CCNC: 49 U/L (ref 39–100)
ALT SERPL W P-5'-P-CCNC: 30 U/L (ref 13–56)
ANION GAP SERPL CALCULATED.3IONS-SCNC: 5 MMOL/L (ref 7–16)
AST SERPL W P-5'-P-CCNC: 22 U/L (ref 15–37)
BASOPHILS # BLD AUTO: 0.04 K/UL (ref 0–0.2)
BASOPHILS NFR BLD AUTO: 0.8 % (ref 0–1)
BILIRUB SERPL-MCNC: 0.5 MG/DL (ref 0–1.2)
BUN SERPL-MCNC: 14 MG/DL (ref 7–18)
BUN/CREAT SERPL: 18 (ref 6–20)
CALCIUM SERPL-MCNC: 9.3 MG/DL (ref 8.5–10.1)
CHLORIDE SERPL-SCNC: 104 MMOL/L (ref 98–107)
CHOLEST SERPL-MCNC: 199 MG/DL (ref 0–200)
CHOLEST/HDLC SERPL: 2.8 {RATIO}
CO2 SERPL-SCNC: 33 MMOL/L (ref 21–32)
CREAT SERPL-MCNC: 0.77 MG/DL (ref 0.55–1.02)
CREAT UR-MCNC: 201 MG/DL (ref 28–219)
DIFFERENTIAL METHOD BLD: ABNORMAL
EOSINOPHIL # BLD AUTO: 0.14 K/UL (ref 0–0.5)
EOSINOPHIL NFR BLD AUTO: 2.9 % (ref 1–4)
ERYTHROCYTE [DISTWIDTH] IN BLOOD BY AUTOMATED COUNT: 14 % (ref 11.5–14.5)
EST. AVERAGE GLUCOSE BLD GHB EST-MCNC: 100 MG/DL
GLOBULIN SER-MCNC: 3.8 G/DL (ref 2–4)
GLUCOSE SERPL-MCNC: 93 MG/DL (ref 74–106)
HBA1C MFR BLD HPLC: 5.6 % (ref 4.5–6.6)
HCT VFR BLD AUTO: 41 % (ref 38–47)
HCV AB SER QL: NORMAL
HDLC SERPL-MCNC: 72 MG/DL (ref 40–60)
HGB BLD-MCNC: 13.1 G/DL (ref 12–16)
HIV 1+O+2 AB SERPL QL: NORMAL
IMM GRANULOCYTES # BLD AUTO: 0 K/UL (ref 0–0.04)
IMM GRANULOCYTES NFR BLD: 0 % (ref 0–0.4)
LDLC SERPL CALC-MCNC: 114 MG/DL
LDLC/HDLC SERPL: 1.6 {RATIO}
LYMPHOCYTES # BLD AUTO: 2.32 K/UL (ref 1–4.8)
LYMPHOCYTES NFR BLD AUTO: 48 % (ref 27–41)
MCH RBC QN AUTO: 29.2 PG (ref 27–31)
MCHC RBC AUTO-ENTMCNC: 32 G/DL (ref 32–36)
MCV RBC AUTO: 91.3 FL (ref 80–96)
MICROALBUMIN UR-MCNC: 1.1 MG/DL (ref 0–2.8)
MICROALBUMIN/CREAT RATIO PNL UR: 5.5 MG/G (ref 0–30)
MONOCYTES # BLD AUTO: 0.33 K/UL (ref 0–0.8)
MONOCYTES NFR BLD AUTO: 6.8 % (ref 2–6)
MPC BLD CALC-MCNC: 11.5 FL (ref 9.4–12.4)
NEUTROPHILS # BLD AUTO: 2 K/UL (ref 1.8–7.7)
NEUTROPHILS NFR BLD AUTO: 41.5 % (ref 53–65)
NONHDLC SERPL-MCNC: 127 MG/DL
NRBC # BLD AUTO: 0 X10E3/UL
NRBC, AUTO (.00): 0 %
PLATELET # BLD AUTO: 220 K/UL (ref 150–400)
POTASSIUM SERPL-SCNC: 4.2 MMOL/L (ref 3.5–5.1)
PROT SERPL-MCNC: 7.5 G/DL (ref 6.4–8.2)
RBC # BLD AUTO: 4.49 M/UL (ref 4.2–5.4)
SODIUM SERPL-SCNC: 138 MMOL/L (ref 136–145)
T4 FREE SERPL-MCNC: 0.96 NG/DL (ref 0.76–1.46)
TRIGL SERPL-MCNC: 67 MG/DL (ref 35–150)
TSH SERPL DL<=0.005 MIU/L-ACNC: 3.37 UIU/ML (ref 0.36–3.74)
VLDLC SERPL-MCNC: 13 MG/DL
WBC # BLD AUTO: 4.83 K/UL (ref 4.5–11)

## 2021-12-06 PROCEDURE — 99214 PR OFFICE/OUTPT VISIT, EST, LEVL IV, 30-39 MIN: ICD-10-PCS | Mod: ,,, | Performed by: NURSE PRACTITIONER

## 2021-12-06 PROCEDURE — 82570 ASSAY OF URINE CREATININE: CPT | Mod: ,,, | Performed by: CLINICAL MEDICAL LABORATORY

## 2021-12-06 PROCEDURE — 99214 OFFICE O/P EST MOD 30 MIN: CPT | Mod: ,,, | Performed by: NURSE PRACTITIONER

## 2021-12-06 PROCEDURE — 85025 CBC WITH DIFFERENTIAL: ICD-10-PCS | Mod: ,,, | Performed by: CLINICAL MEDICAL LABORATORY

## 2021-12-06 PROCEDURE — 82570 MICROALBUMIN / CREATININE RATIO URINE: ICD-10-PCS | Mod: ,,, | Performed by: CLINICAL MEDICAL LABORATORY

## 2021-12-06 PROCEDURE — 80053 COMPREHEN METABOLIC PANEL: CPT | Mod: ,,, | Performed by: CLINICAL MEDICAL LABORATORY

## 2021-12-06 PROCEDURE — 84439 THYROID PANEL: ICD-10-PCS | Mod: ,,, | Performed by: CLINICAL MEDICAL LABORATORY

## 2021-12-06 PROCEDURE — 82043 UR ALBUMIN QUANTITATIVE: CPT | Mod: ,,, | Performed by: CLINICAL MEDICAL LABORATORY

## 2021-12-06 PROCEDURE — 80061 LIPID PANEL: ICD-10-PCS | Mod: ,,, | Performed by: CLINICAL MEDICAL LABORATORY

## 2021-12-06 PROCEDURE — 83036 HEMOGLOBIN GLYCOSYLATED A1C: CPT | Mod: ,,, | Performed by: CLINICAL MEDICAL LABORATORY

## 2021-12-06 PROCEDURE — 84439 ASSAY OF FREE THYROXINE: CPT | Mod: ,,, | Performed by: CLINICAL MEDICAL LABORATORY

## 2021-12-06 PROCEDURE — 84443 THYROID PANEL: ICD-10-PCS | Mod: ,,, | Performed by: CLINICAL MEDICAL LABORATORY

## 2021-12-06 PROCEDURE — 82043 MICROALBUMIN / CREATININE RATIO URINE: ICD-10-PCS | Mod: ,,, | Performed by: CLINICAL MEDICAL LABORATORY

## 2021-12-06 PROCEDURE — 86803 HEPATITIS C AB TEST: CPT | Mod: ,,, | Performed by: CLINICAL MEDICAL LABORATORY

## 2021-12-06 PROCEDURE — 85025 COMPLETE CBC W/AUTO DIFF WBC: CPT | Mod: ,,, | Performed by: CLINICAL MEDICAL LABORATORY

## 2021-12-06 PROCEDURE — 86803 HEPATITIS C ANTIBODY: ICD-10-PCS | Mod: ,,, | Performed by: CLINICAL MEDICAL LABORATORY

## 2021-12-06 PROCEDURE — 80061 LIPID PANEL: CPT | Mod: ,,, | Performed by: CLINICAL MEDICAL LABORATORY

## 2021-12-06 PROCEDURE — 87389 HIV-1 AG W/HIV-1&-2 AB AG IA: CPT | Mod: ,,, | Performed by: CLINICAL MEDICAL LABORATORY

## 2021-12-06 PROCEDURE — 83036 HEMOGLOBIN A1C: ICD-10-PCS | Mod: ,,, | Performed by: CLINICAL MEDICAL LABORATORY

## 2021-12-06 PROCEDURE — 87389 HIV 1 / 2 ANTIBODY: ICD-10-PCS | Mod: ,,, | Performed by: CLINICAL MEDICAL LABORATORY

## 2021-12-06 PROCEDURE — 80053 COMPREHENSIVE METABOLIC PANEL: ICD-10-PCS | Mod: ,,, | Performed by: CLINICAL MEDICAL LABORATORY

## 2021-12-06 PROCEDURE — 84443 ASSAY THYROID STIM HORMONE: CPT | Mod: ,,, | Performed by: CLINICAL MEDICAL LABORATORY

## 2021-12-06 RX ORDER — IBUPROFEN 800 MG/1
800 TABLET ORAL EVERY 6 HOURS PRN
Qty: 90 TABLET | Refills: 1 | Status: SHIPPED | OUTPATIENT
Start: 2021-12-06 | End: 2022-03-07

## 2021-12-06 RX ORDER — PRAVASTATIN SODIUM 40 MG/1
40 TABLET ORAL NIGHTLY
Qty: 90 TABLET | Refills: 1 | Status: SHIPPED | OUTPATIENT
Start: 2021-12-06 | End: 2022-09-07 | Stop reason: SDUPTHER

## 2021-12-06 RX ORDER — LEVOTHYROXINE SODIUM 25 UG/1
25 TABLET ORAL
Qty: 90 TABLET | Refills: 1 | Status: SHIPPED | OUTPATIENT
Start: 2021-12-06 | End: 2022-09-07 | Stop reason: SDUPTHER

## 2021-12-06 RX ORDER — CELECOXIB 200 MG/1
200 CAPSULE ORAL DAILY
Qty: 90 CAPSULE | Refills: 1 | Status: SHIPPED | OUTPATIENT
Start: 2021-12-06 | End: 2022-09-07 | Stop reason: SDUPTHER

## 2021-12-06 RX ORDER — FUROSEMIDE 20 MG/1
20 TABLET ORAL DAILY
Qty: 90 TABLET | Refills: 1 | Status: SHIPPED | OUTPATIENT
Start: 2021-12-06 | End: 2022-09-07 | Stop reason: SDUPTHER

## 2021-12-06 RX ORDER — CLONIDINE HYDROCHLORIDE 0.2 MG/1
0.2 TABLET ORAL 2 TIMES DAILY
Qty: 180 TABLET | Refills: 1 | Status: SHIPPED | OUTPATIENT
Start: 2021-12-06 | End: 2022-09-07 | Stop reason: SDUPTHER

## 2021-12-06 RX ORDER — LINACLOTIDE 290 UG/1
290 CAPSULE, GELATIN COATED ORAL DAILY
Qty: 90 CAPSULE | Refills: 1 | Status: SHIPPED | OUTPATIENT
Start: 2021-12-06 | End: 2022-09-07 | Stop reason: SDUPTHER

## 2021-12-06 RX ORDER — OLMESARTAN MEDOXOMIL 5 MG/1
5 TABLET ORAL DAILY
Qty: 90 TABLET | Refills: 1 | Status: SHIPPED | OUTPATIENT
Start: 2021-12-06 | End: 2022-09-07 | Stop reason: SDUPTHER

## 2021-12-13 ENCOUNTER — OFFICE VISIT (OUTPATIENT)
Dept: VASCULAR SURGERY | Facility: CLINIC | Age: 46
End: 2021-12-13
Payer: MEDICAID

## 2021-12-13 VITALS
BODY MASS INDEX: 36.69 KG/M2 | DIASTOLIC BLOOD PRESSURE: 70 MMHG | RESPIRATION RATE: 14 BRPM | WEIGHT: 220.19 LBS | HEIGHT: 65 IN | SYSTOLIC BLOOD PRESSURE: 124 MMHG | HEART RATE: 84 BPM

## 2021-12-13 DIAGNOSIS — R23.8 OTHER SKIN CHANGES: ICD-10-CM

## 2021-12-13 DIAGNOSIS — R60.0 LOWER EXTREMITY EDEMA: Primary | ICD-10-CM

## 2021-12-13 DIAGNOSIS — M79.605 PAIN OF LEFT LOWER EXTREMITY: ICD-10-CM

## 2021-12-13 DIAGNOSIS — I87.2 CHRONIC VENOUS INSUFFICIENCY OF LOWER EXTREMITY: ICD-10-CM

## 2021-12-13 PROCEDURE — 99214 OFFICE O/P EST MOD 30 MIN: CPT | Mod: PBBFAC | Performed by: NURSE PRACTITIONER

## 2021-12-13 PROCEDURE — 99214 OFFICE O/P EST MOD 30 MIN: CPT | Mod: S$PBB,,, | Performed by: NURSE PRACTITIONER

## 2021-12-13 PROCEDURE — 3066F NEPHROPATHY DOC TX: CPT | Mod: CPTII,,, | Performed by: NURSE PRACTITIONER

## 2021-12-13 PROCEDURE — 4010F PR ACE/ARB THEARPY RXD/TAKEN: ICD-10-PCS | Mod: CPTII,,, | Performed by: NURSE PRACTITIONER

## 2021-12-13 PROCEDURE — 99214 PR OFFICE/OUTPT VISIT, EST, LEVL IV, 30-39 MIN: ICD-10-PCS | Mod: S$PBB,,, | Performed by: NURSE PRACTITIONER

## 2021-12-13 PROCEDURE — 3061F NEG MICROALBUMINURIA REV: CPT | Mod: CPTII,,, | Performed by: NURSE PRACTITIONER

## 2021-12-13 PROCEDURE — 3066F PR DOCUMENTATION OF TREATMENT FOR NEPHROPATHY: ICD-10-PCS | Mod: CPTII,,, | Performed by: NURSE PRACTITIONER

## 2021-12-13 PROCEDURE — 4010F ACE/ARB THERAPY RXD/TAKEN: CPT | Mod: CPTII,,, | Performed by: NURSE PRACTITIONER

## 2021-12-13 PROCEDURE — 3061F PR NEG MICROALBUMINURIA RESULT DOCUMENTED/REVIEW: ICD-10-PCS | Mod: CPTII,,, | Performed by: NURSE PRACTITIONER

## 2021-12-21 ENCOUNTER — HOSPITAL ENCOUNTER (OUTPATIENT)
Dept: RADIOLOGY | Facility: HOSPITAL | Age: 46
Discharge: HOME | End: 2021-12-21
Attending: NURSE PRACTITIONER
Payer: MEDICAID

## 2021-12-21 DIAGNOSIS — M79.605 PAIN OF LEFT LOWER EXTREMITY: ICD-10-CM

## 2021-12-21 DIAGNOSIS — R60.0 LOWER EXTREMITY EDEMA: ICD-10-CM

## 2021-12-21 PROCEDURE — 75635 CT ANGIO ABDOMINAL ARTERIES: CPT | Mod: TC

## 2021-12-21 PROCEDURE — 75635 CT VENOGRAPHY PELVIS LOWER EXTREMITY W/CONTRAST: ICD-10-PCS | Mod: 26,,, | Performed by: RADIOLOGY

## 2021-12-21 PROCEDURE — 75635 CT ANGIO ABDOMINAL ARTERIES: CPT | Mod: 26,,, | Performed by: RADIOLOGY

## 2021-12-21 PROCEDURE — 25500020 PHARM REV CODE 255: Performed by: NURSE PRACTITIONER

## 2021-12-21 RX ADMIN — IOPAMIDOL 150 ML: 755 INJECTION, SOLUTION INTRAVENOUS at 01:12

## 2021-12-29 ENCOUNTER — OFFICE VISIT (OUTPATIENT)
Dept: VASCULAR SURGERY | Facility: CLINIC | Age: 46
End: 2021-12-29
Payer: MEDICAID

## 2021-12-29 VITALS
TEMPERATURE: 99 F | HEART RATE: 84 BPM | RESPIRATION RATE: 20 BRPM | DIASTOLIC BLOOD PRESSURE: 84 MMHG | BODY MASS INDEX: 36.69 KG/M2 | SYSTOLIC BLOOD PRESSURE: 130 MMHG | HEIGHT: 65 IN | WEIGHT: 220.19 LBS

## 2021-12-29 DIAGNOSIS — M79.10 MYALGIA: Primary | ICD-10-CM

## 2021-12-29 DIAGNOSIS — M79.604 PAIN IN BOTH LOWER EXTREMITIES: ICD-10-CM

## 2021-12-29 DIAGNOSIS — I87.2 VENOUS INSUFFICIENCY (CHRONIC) (PERIPHERAL): ICD-10-CM

## 2021-12-29 DIAGNOSIS — R60.0 EDEMA, LOWER EXTREMITY: ICD-10-CM

## 2021-12-29 DIAGNOSIS — M79.605 PAIN IN BOTH LOWER EXTREMITIES: ICD-10-CM

## 2021-12-29 PROCEDURE — 3061F PR NEG MICROALBUMINURIA RESULT DOCUMENTED/REVIEW: ICD-10-PCS | Mod: CPTII,,, | Performed by: FAMILY MEDICINE

## 2021-12-29 PROCEDURE — 3079F PR MOST RECENT DIASTOLIC BLOOD PRESSURE 80-89 MM HG: ICD-10-PCS | Mod: CPTII,,, | Performed by: FAMILY MEDICINE

## 2021-12-29 PROCEDURE — 4010F ACE/ARB THERAPY RXD/TAKEN: CPT | Mod: CPTII,,, | Performed by: FAMILY MEDICINE

## 2021-12-29 PROCEDURE — 1159F PR MEDICATION LIST DOCUMENTED IN MEDICAL RECORD: ICD-10-PCS | Mod: CPTII,,, | Performed by: FAMILY MEDICINE

## 2021-12-29 PROCEDURE — 1160F RVW MEDS BY RX/DR IN RCRD: CPT | Mod: CPTII,,, | Performed by: FAMILY MEDICINE

## 2021-12-29 PROCEDURE — 3044F PR MOST RECENT HEMOGLOBIN A1C LEVEL <7.0%: ICD-10-PCS | Mod: CPTII,,, | Performed by: FAMILY MEDICINE

## 2021-12-29 PROCEDURE — 3066F NEPHROPATHY DOC TX: CPT | Mod: CPTII,,, | Performed by: FAMILY MEDICINE

## 2021-12-29 PROCEDURE — 3075F SYST BP GE 130 - 139MM HG: CPT | Mod: CPTII,,, | Performed by: FAMILY MEDICINE

## 2021-12-29 PROCEDURE — 1160F PR REVIEW ALL MEDS BY PRESCRIBER/CLIN PHARMACIST DOCUMENTED: ICD-10-PCS | Mod: CPTII,,, | Performed by: FAMILY MEDICINE

## 2021-12-29 PROCEDURE — 3079F DIAST BP 80-89 MM HG: CPT | Mod: CPTII,,, | Performed by: FAMILY MEDICINE

## 2021-12-29 PROCEDURE — 3008F PR BODY MASS INDEX (BMI) DOCUMENTED: ICD-10-PCS | Mod: CPTII,,, | Performed by: FAMILY MEDICINE

## 2021-12-29 PROCEDURE — 99214 OFFICE O/P EST MOD 30 MIN: CPT | Mod: PBBFAC | Performed by: FAMILY MEDICINE

## 2021-12-29 PROCEDURE — 3075F PR MOST RECENT SYSTOLIC BLOOD PRESS GE 130-139MM HG: ICD-10-PCS | Mod: CPTII,,, | Performed by: FAMILY MEDICINE

## 2021-12-29 PROCEDURE — 3066F PR DOCUMENTATION OF TREATMENT FOR NEPHROPATHY: ICD-10-PCS | Mod: CPTII,,, | Performed by: FAMILY MEDICINE

## 2021-12-29 PROCEDURE — 99214 OFFICE O/P EST MOD 30 MIN: CPT | Mod: S$PBB,,, | Performed by: FAMILY MEDICINE

## 2021-12-29 PROCEDURE — 4010F PR ACE/ARB THEARPY RXD/TAKEN: ICD-10-PCS | Mod: CPTII,,, | Performed by: FAMILY MEDICINE

## 2021-12-29 PROCEDURE — 3044F HG A1C LEVEL LT 7.0%: CPT | Mod: CPTII,,, | Performed by: FAMILY MEDICINE

## 2021-12-29 PROCEDURE — 1159F MED LIST DOCD IN RCRD: CPT | Mod: CPTII,,, | Performed by: FAMILY MEDICINE

## 2021-12-29 PROCEDURE — 3008F BODY MASS INDEX DOCD: CPT | Mod: CPTII,,, | Performed by: FAMILY MEDICINE

## 2021-12-29 PROCEDURE — 99214 PR OFFICE/OUTPT VISIT, EST, LEVL IV, 30-39 MIN: ICD-10-PCS | Mod: S$PBB,,, | Performed by: FAMILY MEDICINE

## 2021-12-29 PROCEDURE — 3061F NEG MICROALBUMINURIA REV: CPT | Mod: CPTII,,, | Performed by: FAMILY MEDICINE

## 2021-12-30 ENCOUNTER — OFFICE VISIT (OUTPATIENT)
Dept: FAMILY MEDICINE | Facility: CLINIC | Age: 46
End: 2021-12-30
Payer: MEDICAID

## 2021-12-30 VITALS
SYSTOLIC BLOOD PRESSURE: 129 MMHG | TEMPERATURE: 99 F | BODY MASS INDEX: 36.94 KG/M2 | HEART RATE: 84 BPM | RESPIRATION RATE: 18 BRPM | OXYGEN SATURATION: 100 % | DIASTOLIC BLOOD PRESSURE: 81 MMHG | WEIGHT: 222 LBS

## 2021-12-30 DIAGNOSIS — U07.1 COVID-19: Primary | ICD-10-CM

## 2021-12-30 DIAGNOSIS — R05.9 COUGH: ICD-10-CM

## 2021-12-30 PROCEDURE — 87426 SARS CORONAVIRUS 2 ANTIGEN POCT: ICD-10-PCS | Mod: QW,,, | Performed by: NURSE PRACTITIONER

## 2021-12-30 PROCEDURE — 3074F SYST BP LT 130 MM HG: CPT | Mod: ,,, | Performed by: NURSE PRACTITIONER

## 2021-12-30 PROCEDURE — 99213 OFFICE O/P EST LOW 20 MIN: CPT | Mod: ,,, | Performed by: NURSE PRACTITIONER

## 2021-12-30 PROCEDURE — 3008F PR BODY MASS INDEX (BMI) DOCUMENTED: ICD-10-PCS | Mod: ,,, | Performed by: NURSE PRACTITIONER

## 2021-12-30 PROCEDURE — 99213 PR OFFICE/OUTPT VISIT, EST, LEVL III, 20-29 MIN: ICD-10-PCS | Mod: ,,, | Performed by: NURSE PRACTITIONER

## 2021-12-30 PROCEDURE — 3008F BODY MASS INDEX DOCD: CPT | Mod: ,,, | Performed by: NURSE PRACTITIONER

## 2021-12-30 PROCEDURE — 3074F PR MOST RECENT SYSTOLIC BLOOD PRESSURE < 130 MM HG: ICD-10-PCS | Mod: ,,, | Performed by: NURSE PRACTITIONER

## 2021-12-30 PROCEDURE — 87426 SARSCOV CORONAVIRUS AG IA: CPT | Mod: QW,,, | Performed by: NURSE PRACTITIONER

## 2021-12-30 RX ORDER — CEFDINIR 300 MG/1
300 CAPSULE ORAL 2 TIMES DAILY
Qty: 14 CAPSULE | Refills: 0 | Status: SHIPPED | OUTPATIENT
Start: 2021-12-30 | End: 2022-01-06

## 2021-12-30 RX ORDER — AZITHROMYCIN 250 MG/1
TABLET, FILM COATED ORAL
Qty: 6 TABLET | Refills: 0 | Status: SHIPPED | OUTPATIENT
Start: 2021-12-30 | End: 2022-03-07 | Stop reason: ALTCHOICE

## 2021-12-30 RX ORDER — METHYLPREDNISOLONE 4 MG/1
TABLET ORAL
Qty: 21 TABLET | Refills: 0 | Status: SHIPPED | OUTPATIENT
Start: 2021-12-30 | End: 2022-03-07 | Stop reason: ALTCHOICE

## 2021-12-30 RX ORDER — BENZONATATE 200 MG/1
200 CAPSULE ORAL 3 TIMES DAILY PRN
Qty: 60 CAPSULE | Refills: 0 | Status: SHIPPED | OUTPATIENT
Start: 2021-12-30 | End: 2022-01-09

## 2022-01-03 LAB
CTP QC/QA: YES
SARS-COV-2 AG RESP QL IA.RAPID: POSITIVE

## 2022-01-06 ENCOUNTER — CLINICAL SUPPORT (OUTPATIENT)
Dept: FAMILY MEDICINE | Facility: CLINIC | Age: 47
End: 2022-01-06
Payer: MEDICAID

## 2022-01-06 DIAGNOSIS — Z11.59 SCREENING FOR VIRAL DISEASE: Primary | ICD-10-CM

## 2022-01-06 LAB
CTP QC/QA: YES
SARS-COV-2 AG RESP QL IA.RAPID: NEGATIVE

## 2022-01-06 PROCEDURE — 87426 SARSCOV CORONAVIRUS AG IA: CPT | Mod: QW,,, | Performed by: NURSE PRACTITIONER

## 2022-01-06 PROCEDURE — 87426 SARS CORONAVIRUS 2 ANTIGEN POCT: ICD-10-PCS | Mod: QW,,, | Performed by: NURSE PRACTITIONER

## 2022-01-31 ENCOUNTER — OFFICE VISIT (OUTPATIENT)
Dept: VASCULAR SURGERY | Facility: CLINIC | Age: 47
End: 2022-01-31
Payer: MEDICAID

## 2022-01-31 VITALS
RESPIRATION RATE: 12 BRPM | HEIGHT: 65 IN | WEIGHT: 219.38 LBS | BODY MASS INDEX: 36.55 KG/M2 | HEART RATE: 76 BPM | DIASTOLIC BLOOD PRESSURE: 80 MMHG | SYSTOLIC BLOOD PRESSURE: 152 MMHG

## 2022-01-31 DIAGNOSIS — M79.10 MYALGIA: ICD-10-CM

## 2022-01-31 DIAGNOSIS — I87.2 VENOUS INSUFFICIENCY: Primary | ICD-10-CM

## 2022-01-31 DIAGNOSIS — M79.605 PAIN OF LEFT LOWER EXTREMITY: ICD-10-CM

## 2022-01-31 PROCEDURE — 3079F PR MOST RECENT DIASTOLIC BLOOD PRESSURE 80-89 MM HG: ICD-10-PCS | Mod: CPTII,,, | Performed by: NURSE PRACTITIONER

## 2022-01-31 PROCEDURE — 3079F DIAST BP 80-89 MM HG: CPT | Mod: CPTII,,, | Performed by: NURSE PRACTITIONER

## 2022-01-31 PROCEDURE — 99214 PR OFFICE/OUTPT VISIT, EST, LEVL IV, 30-39 MIN: ICD-10-PCS | Mod: S$PBB,,, | Performed by: NURSE PRACTITIONER

## 2022-01-31 PROCEDURE — 99214 OFFICE O/P EST MOD 30 MIN: CPT | Mod: S$PBB,,, | Performed by: NURSE PRACTITIONER

## 2022-01-31 PROCEDURE — 3008F PR BODY MASS INDEX (BMI) DOCUMENTED: ICD-10-PCS | Mod: CPTII,,, | Performed by: NURSE PRACTITIONER

## 2022-01-31 PROCEDURE — 99214 OFFICE O/P EST MOD 30 MIN: CPT | Mod: PBBFAC | Performed by: NURSE PRACTITIONER

## 2022-01-31 PROCEDURE — 1159F PR MEDICATION LIST DOCUMENTED IN MEDICAL RECORD: ICD-10-PCS | Mod: CPTII,,, | Performed by: NURSE PRACTITIONER

## 2022-01-31 PROCEDURE — 1159F MED LIST DOCD IN RCRD: CPT | Mod: CPTII,,, | Performed by: NURSE PRACTITIONER

## 2022-01-31 PROCEDURE — 3077F PR MOST RECENT SYSTOLIC BLOOD PRESSURE >= 140 MM HG: ICD-10-PCS | Mod: CPTII,,, | Performed by: NURSE PRACTITIONER

## 2022-01-31 PROCEDURE — 3008F BODY MASS INDEX DOCD: CPT | Mod: CPTII,,, | Performed by: NURSE PRACTITIONER

## 2022-01-31 PROCEDURE — 3077F SYST BP >= 140 MM HG: CPT | Mod: CPTII,,, | Performed by: NURSE PRACTITIONER

## 2022-01-31 NOTE — PROGRESS NOTES
VE[facility] CENTER CLINIC NOTE    Patient ID: Maury Mckenzie is a 46 y.o. female.    I. HISTORY     Chief Complaint:   Chief Complaint   Patient presents with    Leg Pain     Presents today with left leg pain.      Follow-up     4 1/2 months S/P left distal GSV remnant laser ablation.       HPI: Maury Mckenzie is a 46 y.o. female who presents today for one month follow-up. She continues to report pain/ soreness located on the medial aspect of the left lower leg when someone touches the leg; she says her grandkids are bad about jumping in her lap and they will kick this area of her leg which causes her significant pain. She denies pain otherwise. She underwent a previous CTV on 2021.  This study shows mild-to-moderate nonspecific narrowing of the bilateral did deep veins at the common femoral level which is nonspecific.  No evidence of DVT.  Superficial varices of the bilateral thighs.  Fat containing umbilical hernia, prior hysterectomy, colonic diverticulosis and scattered skeletal degenerative changes.    She underwent previous left distal GSV remnant laser ablation. The patient states that the pain she was having of the varicose veins on her left lateral leg has resolved. Her primary complaint now is as described above. She continues to wear  compression most days. She continues with other conservative measures as well.     Vein procedure:   Left distal GSV remnant ablation 9-10-21      Past Medical History:   Diagnosis Date    Hyperlipidemia     Hypertension     Hypothyroidism         Past Surgical History:   Procedure Laterality Date    CARDIAC CATHETERIZATION      Dr. Beatty    CARPAL TUNNEL RELEASE Left     Dr. Byers     SECTION  1996    x 1    LASER ABLATION Left 2019    Left GSV Laser Ablation performed by Dr. Juan Yang    LASER ABLATION Left 9/10/2021    Procedure: Left distal GSV Remnant Laser Ablation;  Surgeon: Ed Yang DO;  Location: [facility] OR;  Service:  Vascular;  Laterality: Left;    TOTAL ABDOMINAL HYSTERECTOMY  2012    Dr. Christine       Social History     Tobacco Use   Smoking Status Never Smoker   Smokeless Tobacco Never Used         Current Outpatient Medications:     aspirin (ECOTRIN) 81 MG EC tablet, Take 81 mg by mouth once daily., Disp: , Rfl:     azithromycin (Z-CARIE) 250 MG tablet, Take 2 tablets by mouth on day 1; Take 1 tablet by mouth on days 2-5, Disp: 6 tablet, Rfl: 0    celecoxib (CELEBREX) 200 MG capsule, Take 1 capsule (200 mg total) by mouth once daily., Disp: 90 capsule, Rfl: 1    cloNIDine (CATAPRES) 0.2 MG tablet, Take 1 tablet (0.2 mg total) by mouth 2 (two) times daily., Disp: 180 tablet, Rfl: 1    furosemide (LASIX) 20 MG tablet, Take 1 tablet (20 mg total) by mouth once daily., Disp: 90 tablet, Rfl: 1    ibuprofen (ADVIL,MOTRIN) 800 MG tablet, Take 1 tablet (800 mg total) by mouth every 6 (six) hours as needed for Pain., Disp: 90 tablet, Rfl: 1    levothyroxine (SYNTHROID) 25 MCG tablet, Take 1 tablet (25 mcg total) by mouth before breakfast., Disp: 90 tablet, Rfl: 1    LINZESS 290 mcg Cap capsule, Take 1 capsule (290 mcg total) by mouth once daily., Disp: 90 capsule, Rfl: 1    methylPREDNISolone (MEDROL DOSEPACK) 4 mg tablet, use as directed, Disp: 21 tablet, Rfl: 0    methylPREDNISolone (MEDROL DOSEPACK) 4 mg tablet, use as directed, Disp: 21 tablet, Rfl: 0    metroNIDAZOLE (FLAGYL) 500 MG tablet, , Disp: , Rfl:     olmesartan (BENICAR) 5 MG Tab, Take 1 tablet (5 mg total) by mouth once daily., Disp: 90 tablet, Rfl: 1    pravastatin (PRAVACHOL) 40 MG tablet, Take 1 tablet (40 mg total) by mouth every evening., Disp: 90 tablet, Rfl: 1    tiZANidine (ZANAFLEX) 4 MG tablet, Take 1 tablet (4 mg total) by mouth every 8 (eight) hours., Disp: 20 tablet, Rfl: 0    Review of Systems   Constitutional: Negative for fatigue and fever.   Eyes: Negative for pain.   Respiratory: Negative for chest tightness and shortness of breath.     Cardiovascular: Positive for leg swelling. Negative for chest pain.   Gastrointestinal: Negative for abdominal pain.   Musculoskeletal: Positive for leg pain.        Chronic darker skin changes bilaterally   Neurological: Negative for syncope.   Psychiatric/Behavioral: Negative for sleep disturbance.          II. PHYSICAL EXAM     Physical Exam  Vitals reviewed.   Constitutional:       General: She is not in acute distress.     Appearance: She is obese.   HENT:      Head: Normocephalic.   Eyes:      Pupils: Pupils are equal, round, and reactive to light.   Cardiovascular:      Rate and Rhythm: Normal rate and regular rhythm.   Pulmonary:      Effort: Pulmonary effort is normal.   Abdominal:      Palpations: Abdomen is soft.   Musculoskeletal:         General: No swelling. Normal range of motion.      Right lower leg: No edema.      Left lower leg: Edema present.   Skin:     General: Skin is warm and dry.   Neurological:      Mental Status: She is alert and oriented to person, place, and time.       III. ASSESSMENT & PLAN (MEDICAL DECISION MAKING)     1. Venous insufficiency    2. Myalgia    3. Pain of left lower extremity        Assessment/Diagnosis and Plan:    - Recommend continued compression with 20-30mmHg Rx stockings  - Therapeutic leg elevation  - Calf pumping exercises  - will check vitamin-D and B12 folate due to lower extremity symptoms.  Will call with results. She was to hve these labs done on the previous visit but she did not go by the lab. She was instructed to go by lab today on her way out for lab work.   - RTC in March for her 6 month PA f/u or sooner if needed.     KIARRA Gonzalez

## 2022-02-03 ENCOUNTER — TELEPHONE (OUTPATIENT)
Dept: VASCULAR SURGERY | Facility: CLINIC | Age: 47
End: 2022-02-03
Payer: MEDICAID

## 2022-02-03 DIAGNOSIS — E55.9 VITAMIN D DEFICIENCY: Primary | ICD-10-CM

## 2022-02-03 RX ORDER — ERGOCALCIFEROL 1.25 MG/1
50000 CAPSULE ORAL
Qty: 12 CAPSULE | Refills: 1 | Status: SHIPPED | OUTPATIENT
Start: 2022-02-03 | End: 2022-09-07 | Stop reason: SDUPTHER

## 2022-03-07 ENCOUNTER — OFFICE VISIT (OUTPATIENT)
Dept: FAMILY MEDICINE | Facility: CLINIC | Age: 47
End: 2022-03-07
Payer: MEDICAID

## 2022-03-07 VITALS
BODY MASS INDEX: 37.49 KG/M2 | SYSTOLIC BLOOD PRESSURE: 137 MMHG | DIASTOLIC BLOOD PRESSURE: 88 MMHG | RESPIRATION RATE: 18 BRPM | WEIGHT: 225 LBS | OXYGEN SATURATION: 100 % | TEMPERATURE: 98 F | HEIGHT: 65 IN | HEART RATE: 78 BPM

## 2022-03-07 DIAGNOSIS — M79.606 CHRONIC PAIN OF LOWER EXTREMITY, UNSPECIFIED LATERALITY: ICD-10-CM

## 2022-03-07 DIAGNOSIS — I10 PRIMARY HYPERTENSION: Primary | ICD-10-CM

## 2022-03-07 DIAGNOSIS — G89.29 CHRONIC PAIN OF LOWER EXTREMITY, UNSPECIFIED LATERALITY: ICD-10-CM

## 2022-03-07 DIAGNOSIS — E78.5 HYPERLIPIDEMIA, UNSPECIFIED HYPERLIPIDEMIA TYPE: ICD-10-CM

## 2022-03-07 DIAGNOSIS — E03.9 HYPOTHYROIDISM, UNSPECIFIED TYPE: ICD-10-CM

## 2022-03-07 LAB
ALBUMIN SERPL BCP-MCNC: 3.5 G/DL (ref 3.5–5)
ALBUMIN/GLOB SERPL: 0.9 {RATIO}
ALP SERPL-CCNC: 51 U/L (ref 39–100)
ALT SERPL W P-5'-P-CCNC: 21 U/L (ref 13–56)
ANION GAP SERPL CALCULATED.3IONS-SCNC: 7 MMOL/L (ref 7–16)
AST SERPL W P-5'-P-CCNC: 18 U/L (ref 15–37)
BASOPHILS # BLD AUTO: 0.04 K/UL (ref 0–0.2)
BASOPHILS NFR BLD AUTO: 0.8 % (ref 0–1)
BILIRUB SERPL-MCNC: 0.6 MG/DL (ref 0–1.2)
BUN SERPL-MCNC: 18 MG/DL (ref 7–18)
BUN/CREAT SERPL: 23 (ref 6–20)
CALCIUM SERPL-MCNC: 9.2 MG/DL (ref 8.5–10.1)
CHLORIDE SERPL-SCNC: 107 MMOL/L (ref 98–107)
CHOLEST SERPL-MCNC: 195 MG/DL (ref 0–200)
CHOLEST/HDLC SERPL: 2.8 {RATIO}
CO2 SERPL-SCNC: 30 MMOL/L (ref 21–32)
CREAT SERPL-MCNC: 0.78 MG/DL (ref 0.55–1.02)
DIFFERENTIAL METHOD BLD: ABNORMAL
EOSINOPHIL # BLD AUTO: 0.22 K/UL (ref 0–0.5)
EOSINOPHIL NFR BLD AUTO: 4.4 % (ref 1–4)
ERYTHROCYTE [DISTWIDTH] IN BLOOD BY AUTOMATED COUNT: 13.9 % (ref 11.5–14.5)
GLOBULIN SER-MCNC: 3.7 G/DL (ref 2–4)
GLUCOSE SERPL-MCNC: 89 MG/DL (ref 74–106)
HCT VFR BLD AUTO: 40 % (ref 38–47)
HDLC SERPL-MCNC: 70 MG/DL (ref 40–60)
HGB BLD-MCNC: 12.5 G/DL (ref 12–16)
IMM GRANULOCYTES # BLD AUTO: 0.01 K/UL (ref 0–0.04)
IMM GRANULOCYTES NFR BLD: 0.2 % (ref 0–0.4)
LDLC SERPL CALC-MCNC: 111 MG/DL
LDLC/HDLC SERPL: 1.6 {RATIO}
LYMPHOCYTES # BLD AUTO: 2.14 K/UL (ref 1–4.8)
LYMPHOCYTES NFR BLD AUTO: 42.6 % (ref 27–41)
MCH RBC QN AUTO: 29.4 PG (ref 27–31)
MCHC RBC AUTO-ENTMCNC: 31.3 G/DL (ref 32–36)
MCV RBC AUTO: 94.1 FL (ref 80–96)
MONOCYTES # BLD AUTO: 0.35 K/UL (ref 0–0.8)
MONOCYTES NFR BLD AUTO: 7 % (ref 2–6)
MPC BLD CALC-MCNC: 11 FL (ref 9.4–12.4)
NEUTROPHILS # BLD AUTO: 2.26 K/UL (ref 1.8–7.7)
NEUTROPHILS NFR BLD AUTO: 45 % (ref 53–65)
NONHDLC SERPL-MCNC: 125 MG/DL
NRBC # BLD AUTO: 0 X10E3/UL
NRBC, AUTO (.00): 0 %
PLATELET # BLD AUTO: 242 K/UL (ref 150–400)
POTASSIUM SERPL-SCNC: 4.2 MMOL/L (ref 3.5–5.1)
PROT SERPL-MCNC: 7.2 G/DL (ref 6.4–8.2)
RBC # BLD AUTO: 4.25 M/UL (ref 4.2–5.4)
SODIUM SERPL-SCNC: 140 MMOL/L (ref 136–145)
T4 FREE SERPL-MCNC: 0.93 NG/DL (ref 0.76–1.46)
TRIGL SERPL-MCNC: 69 MG/DL (ref 35–150)
TSH SERPL DL<=0.005 MIU/L-ACNC: 2.73 UIU/ML (ref 0.36–3.74)
VLDLC SERPL-MCNC: 14 MG/DL
WBC # BLD AUTO: 5.02 K/UL (ref 4.5–11)

## 2022-03-07 PROCEDURE — 3008F PR BODY MASS INDEX (BMI) DOCUMENTED: ICD-10-PCS | Mod: ,,, | Performed by: NURSE PRACTITIONER

## 2022-03-07 PROCEDURE — 84439 ASSAY OF FREE THYROXINE: CPT | Mod: ,,, | Performed by: CLINICAL MEDICAL LABORATORY

## 2022-03-07 PROCEDURE — 84443 THYROID PANEL: ICD-10-PCS | Mod: ,,, | Performed by: CLINICAL MEDICAL LABORATORY

## 2022-03-07 PROCEDURE — 3008F BODY MASS INDEX DOCD: CPT | Mod: ,,, | Performed by: NURSE PRACTITIONER

## 2022-03-07 PROCEDURE — 80061 LIPID PANEL: CPT | Mod: ,,, | Performed by: CLINICAL MEDICAL LABORATORY

## 2022-03-07 PROCEDURE — 84439 THYROID PANEL: ICD-10-PCS | Mod: ,,, | Performed by: CLINICAL MEDICAL LABORATORY

## 2022-03-07 PROCEDURE — 84443 ASSAY THYROID STIM HORMONE: CPT | Mod: ,,, | Performed by: CLINICAL MEDICAL LABORATORY

## 2022-03-07 PROCEDURE — 80053 COMPREHEN METABOLIC PANEL: CPT | Mod: ,,, | Performed by: CLINICAL MEDICAL LABORATORY

## 2022-03-07 PROCEDURE — 80061 LIPID PANEL: ICD-10-PCS | Mod: ,,, | Performed by: CLINICAL MEDICAL LABORATORY

## 2022-03-07 PROCEDURE — 80053 COMPREHENSIVE METABOLIC PANEL: ICD-10-PCS | Mod: ,,, | Performed by: CLINICAL MEDICAL LABORATORY

## 2022-03-07 PROCEDURE — 99214 PR OFFICE/OUTPT VISIT, EST, LEVL IV, 30-39 MIN: ICD-10-PCS | Mod: ,,, | Performed by: NURSE PRACTITIONER

## 2022-03-07 PROCEDURE — 99214 OFFICE O/P EST MOD 30 MIN: CPT | Mod: ,,, | Performed by: NURSE PRACTITIONER

## 2022-03-07 RX ORDER — TIZANIDINE 4 MG/1
4 TABLET ORAL NIGHTLY PRN
Qty: 60 TABLET | Refills: 1 | Status: SHIPPED | OUTPATIENT
Start: 2022-03-07 | End: 2022-09-07 | Stop reason: SDUPTHER

## 2022-03-07 RX ORDER — ASPIRIN 81 MG/1
81 TABLET ORAL DAILY
Qty: 90 TABLET | Refills: 1 | Status: SHIPPED | OUTPATIENT
Start: 2022-03-07

## 2022-03-07 NOTE — PROGRESS NOTES
Alvina Shi DNP   [address] [place] [address]     PATIENT NAME: Maury Mckenzie  : 1975  DATE: 3/7/22  MRN: [id number]      Billing Provider: Alvina Shi DNP  Level of Service:   Patient PCP Information     Provider PCP Type    Alvina Shi DNP General          Reason for Visit / Chief Complaint: No chief complaint on file.       Update PCP  Update Chief Complaint         History of Present Illness / Problem Focused Workflow     Maury Mckenzie presents to the clinic with No chief complaint on file.     Follow-up        Review of Systems     Review of Systems     Medical / Social / Family History     Past Medical History:   Diagnosis Date    Hyperlipidemia     Hypertension     Hypothyroidism        Past Surgical History:   Procedure Laterality Date    CARDIAC CATHETERIZATION      Dr. Beatty    CARPAL TUNNEL RELEASE Left     Dr. Byers     SECTION  1996    x 1    LASER ABLATION Left 2019    Left GSV Laser Ablation performed by Dr. Juan Yang    LASER ABLATION Left 9/10/2021    Procedure: Left distal GSV Remnant Laser Ablation;  Surgeon: Ed Yang DO;  Location: [facility];  Service: Vascular;  Laterality: Left;    TOTAL ABDOMINAL HYSTERECTOMY      Dr. Christine       Social History  Ms.  reports that she has never smoked. She has never used smokeless tobacco. She reports previous alcohol use. She reports that she does not use drugs.    Family History  Ms.'s family history includes Hypertension in her mother; No Known Problems in her brother, father, and sister.    Medications and Allergies     Medications  No outpatient medications have been marked as taking for the 3/7/22 encounter (Appointment) with Alvina Shi DNP.       Allergies  Review of patient's allergies indicates:  No Known Allergies    Physical Examination   There were no vitals filed for this visit.  Physical Exam  Vitals and nursing note reviewed.    Constitutional:       Appearance: Normal appearance. She is normal weight.   HENT:      Head: Normocephalic.   Eyes:      Pupils: Pupils are equal, round, and reactive to light.   Cardiovascular:      Rate and Rhythm: Normal rate and regular rhythm.      Pulses: Normal pulses.      Heart sounds: Normal heart sounds.   Pulmonary:      Effort: Pulmonary effort is normal.      Breath sounds: Normal breath sounds.   Abdominal:      General: Abdomen is flat. Bowel sounds are normal.      Palpations: Abdomen is soft.   Musculoskeletal:         General: Normal range of motion.      Cervical back: Normal range of motion and neck supple.      Right lower leg: Edema present.      Left lower leg: Edema present.      Comments: Trace edema BLE   Skin:     General: Skin is warm and dry.      Capillary Refill: Capillary refill takes less than 2 seconds.   Neurological:      General: No focal deficit present.      Mental Status: She is alert and oriented to person, place, and time. Mental status is at baseline.   Psychiatric:         Mood and Affect: Mood normal.         Behavior: Behavior normal.         Thought Content: Thought content normal.         Judgment: Judgment normal.          Assessment and Plan (including Health Maintenance)      Problem List  Smart Sets  Document Outside HM   :    Plan:   here for fu appt.        mmg  9/2021  Had pap 4/2021   declined flu and covid vaccines  optho - >2 years,. Number given to call   Colonoscopy - no fam hx start at 49yo.  use mobic sparingly seeing PM now  Been some time-2019     Nonsmoker. Takes asa 81mg daily.    card - 10/[facility]21 [facility] 10/2021  Keep f/u with Trey. saw alexey 1/2021 q6 months has procedure on 9/9/2021 has appt on 3/14/2022     Keep f/u with Nael for CTS surgery. Left first, then right. last seen 9/2020         Health Maintenance Due   Topic Date Due    Colorectal Cancer Screening  Never done       Problem List Items Addressed This Visit     None         Health Maintenance Topics with due status: Not Due       Topic Last Completion Date    Mammogram 09/08/2021    Lipid Panel 12/06/2021       Future Appointments   Date Time Provider Department Center   3/7/2022  9:15 AM Alvina Shi DNP [facility]   3/14/2022  9:15 AM Ed Yang DO [facility]   9/12/2022 10:45 AM [facility]            Signature:  Alvina Shi DNP      [address] [place] [address]    Date of encounter: 3/7/22

## 2022-03-14 ENCOUNTER — OFFICE VISIT (OUTPATIENT)
Dept: VASCULAR SURGERY | Facility: CLINIC | Age: 47
End: 2022-03-14
Payer: MEDICAID

## 2022-03-14 VITALS
HEART RATE: 84 BPM | WEIGHT: 224.63 LBS | SYSTOLIC BLOOD PRESSURE: 120 MMHG | DIASTOLIC BLOOD PRESSURE: 70 MMHG | HEIGHT: 65 IN | BODY MASS INDEX: 37.43 KG/M2 | RESPIRATION RATE: 12 BRPM

## 2022-03-14 DIAGNOSIS — I87.2 VENOUS INSUFFICIENCY: ICD-10-CM

## 2022-03-14 DIAGNOSIS — M79.604 LEG PAIN, BILATERAL: ICD-10-CM

## 2022-03-14 DIAGNOSIS — E55.9 VITAMIN D DEFICIENCY: Primary | ICD-10-CM

## 2022-03-14 DIAGNOSIS — R60.0 EDEMA, LOWER EXTREMITY: Primary | ICD-10-CM

## 2022-03-14 DIAGNOSIS — M79.605 LEG PAIN, BILATERAL: ICD-10-CM

## 2022-03-14 PROCEDURE — 3074F SYST BP LT 130 MM HG: CPT | Mod: CPTII,,, | Performed by: FAMILY MEDICINE

## 2022-03-14 PROCEDURE — 1159F MED LIST DOCD IN RCRD: CPT | Mod: CPTII,,, | Performed by: FAMILY MEDICINE

## 2022-03-14 PROCEDURE — 99214 PR OFFICE/OUTPT VISIT, EST, LEVL IV, 30-39 MIN: ICD-10-PCS | Mod: S$PBB,,, | Performed by: FAMILY MEDICINE

## 2022-03-14 PROCEDURE — 4010F ACE/ARB THERAPY RXD/TAKEN: CPT | Mod: CPTII,,, | Performed by: FAMILY MEDICINE

## 2022-03-14 PROCEDURE — 99214 OFFICE O/P EST MOD 30 MIN: CPT | Mod: S$PBB,,, | Performed by: FAMILY MEDICINE

## 2022-03-14 PROCEDURE — 1160F PR REVIEW ALL MEDS BY PRESCRIBER/CLIN PHARMACIST DOCUMENTED: ICD-10-PCS | Mod: CPTII,,, | Performed by: FAMILY MEDICINE

## 2022-03-14 PROCEDURE — 4010F PR ACE/ARB THEARPY RXD/TAKEN: ICD-10-PCS | Mod: CPTII,,, | Performed by: FAMILY MEDICINE

## 2022-03-14 PROCEDURE — 99214 OFFICE O/P EST MOD 30 MIN: CPT | Mod: PBBFAC | Performed by: FAMILY MEDICINE

## 2022-03-14 PROCEDURE — 3008F BODY MASS INDEX DOCD: CPT | Mod: CPTII,,, | Performed by: FAMILY MEDICINE

## 2022-03-14 PROCEDURE — 3008F PR BODY MASS INDEX (BMI) DOCUMENTED: ICD-10-PCS | Mod: CPTII,,, | Performed by: FAMILY MEDICINE

## 2022-03-14 PROCEDURE — 1160F RVW MEDS BY RX/DR IN RCRD: CPT | Mod: CPTII,,, | Performed by: FAMILY MEDICINE

## 2022-03-14 PROCEDURE — 3074F PR MOST RECENT SYSTOLIC BLOOD PRESSURE < 130 MM HG: ICD-10-PCS | Mod: CPTII,,, | Performed by: FAMILY MEDICINE

## 2022-03-14 PROCEDURE — 1159F PR MEDICATION LIST DOCUMENTED IN MEDICAL RECORD: ICD-10-PCS | Mod: CPTII,,, | Performed by: FAMILY MEDICINE

## 2022-03-14 PROCEDURE — 3078F PR MOST RECENT DIASTOLIC BLOOD PRESSURE < 80 MM HG: ICD-10-PCS | Mod: CPTII,,, | Performed by: FAMILY MEDICINE

## 2022-03-14 PROCEDURE — 3078F DIAST BP <80 MM HG: CPT | Mod: CPTII,,, | Performed by: FAMILY MEDICINE

## 2022-03-14 NOTE — PROGRESS NOTES
VEIN CENTER CLINIC NOTE    Patient ID: Maury Mckenzie is a 46 y.o. female.    I. HISTORY     Chief Complaint:   Chief Complaint   Patient presents with    Follow-up     EXAM ROOM 4/ 6 MTHS S/P LEFT DISTAL GSV REMNANT LASER ABLATION       HPI: Maury Mckenzie is a 46 y.o. female who presents today for a 6 month status non thermal post ablation of the left distal great saphenous vein.  The patient states that she continues to have some soreness in the medial aspect of the left lower leg.  She states he can be tender/painful if she bumps into something.  She feels that she has some residual swelling, but overall things have gotten a little better.  She was noted to have a low vitamin-D level of 11.9 on 2022 was started on replacement therapy.  Will need to recheck this on proximally May 1st.    Clinical summary :  She underwent a previous CTV on 2021.  This study shows mild-to-moderate nonspecific narrowing of the bilateral deep veins at the common femoral level which is nonspecific.  No evidence of DVT.  Superficial varices of the bilateral thighs.  Fat containing umbilical hernia, prior hysterectomy, colonic diverticulosis and scattered skeletal degenerative changes.    She underwent previous left distal GSV remnant laser ablation. The patient states that the pain she was having of the varicose veins on her left lateral leg has resolved. Her primary complaint now is as described above. She continues to wear  compression most days. She continues with other conservative measures as well.     Left distal GSV remnant ablation 9-10-21    Past Medical History:   Diagnosis Date    Hyperlipidemia     Hypertension     Hypothyroidism         Past Surgical History:   Procedure Laterality Date    CARDIAC CATHETERIZATION      Dr. Beatty    CARPAL TUNNEL RELEASE Left     Dr. Byers     SECTION  1996    x 1    LASER ABLATION Left 2019    Left GSV Laser Ablation performed by Dr. Juan Yang     LASER ABLATION Left 9/10/2021    Procedure: Left distal GSV Remnant Laser Ablation;  Surgeon: Ed Yang DO;  Location: [facility] OR;  Service: Vascular;  Laterality: Left;    TOTAL ABDOMINAL HYSTERECTOMY  2012    Dr. Christine       Social History     Tobacco Use   Smoking Status Never Smoker   Smokeless Tobacco Never Used         Current Outpatient Medications:     aspirin (ECOTRIN) 81 MG EC tablet, Take 1 tablet (81 mg total) by mouth once daily., Disp: 90 tablet, Rfl: 1    celecoxib (CELEBREX) 200 MG capsule, Take 1 capsule (200 mg total) by mouth once daily., Disp: 90 capsule, Rfl: 1    cloNIDine (CATAPRES) 0.2 MG tablet, Take 1 tablet (0.2 mg total) by mouth 2 (two) times daily., Disp: 180 tablet, Rfl: 1    ergocalciferol (ERGOCALCIFEROL) 50,000 unit Cap, Take 1 capsule (50,000 Units total) by mouth every 7 days., Disp: 12 capsule, Rfl: 1    furosemide (LASIX) 20 MG tablet, Take 1 tablet (20 mg total) by mouth once daily., Disp: 90 tablet, Rfl: 1    levothyroxine (SYNTHROID) 25 MCG tablet, Take 1 tablet (25 mcg total) by mouth before breakfast., Disp: 90 tablet, Rfl: 1    LINZESS 290 mcg Cap capsule, Take 1 capsule (290 mcg total) by mouth once daily., Disp: 90 capsule, Rfl: 1    olmesartan (BENICAR) 5 MG Tab, Take 1 tablet (5 mg total) by mouth once daily., Disp: 90 tablet, Rfl: 1    pravastatin (PRAVACHOL) 40 MG tablet, Take 1 tablet (40 mg total) by mouth every evening., Disp: 90 tablet, Rfl: 1    tiZANidine (ZANAFLEX) 4 MG tablet, Take 1 tablet (4 mg total) by mouth nightly as needed (muscle spasms)., Disp: 60 tablet, Rfl: 1    Review of Systems   Constitutional: Negative for fatigue and fever.   Eyes: Negative for pain.   Respiratory: Negative for chest tightness and shortness of breath.    Cardiovascular: Positive for leg swelling. Negative for chest pain.   Gastrointestinal: Negative for abdominal pain.   Musculoskeletal: Positive for leg pain.        Chronic darker skin changes  bilaterally   Neurological: Negative for syncope.   Psychiatric/Behavioral: Negative for sleep disturbance.          II. PHYSICAL EXAM     Physical Exam  Vitals reviewed.   Constitutional:       General: She is not in acute distress.     Appearance: She is obese.   HENT:      Head: Normocephalic.   Eyes:      Pupils: Pupils are equal, round, and reactive to light.   Cardiovascular:      Rate and Rhythm: Normal rate and regular rhythm.   Pulmonary:      Effort: Pulmonary effort is normal.   Abdominal:      Palpations: Abdomen is soft.   Musculoskeletal:         General: No swelling. Normal range of motion.      Right lower leg: No edema.      Left lower leg: Edema present.   Skin:     General: Skin is warm and dry.   Neurological:      Mental Status: She is alert and oriented to person, place, and time.       III. ASSESSMENT & PLAN (MEDICAL DECISION MAKING)     1. Edema, lower extremity    2. Venous insufficiency    3. Leg pain, bilateral        Assessment/Diagnosis and Plan:    - Recommend continued compression with 20-30mmHg Rx stockings  - Therapeutic leg elevation  - Calf pumping exercises  - schedule recheck of vitamin-D level at [facility] around May 1st.  Call patient with results.  - RTC in 6 months for her 1 year post ablation follow-up.    Ed Yang, DO

## 2022-03-28 ENCOUNTER — HOSPITAL ENCOUNTER (OUTPATIENT)
Dept: RADIOLOGY | Facility: HOSPITAL | Age: 47
Discharge: HOME | End: 2022-03-28
Attending: PAIN MEDICINE
Payer: MEDICAID

## 2022-03-28 ENCOUNTER — OFFICE VISIT (OUTPATIENT)
Dept: PAIN MEDICINE | Facility: CLINIC | Age: 47
End: 2022-03-28
Payer: MEDICAID

## 2022-03-28 VITALS
SYSTOLIC BLOOD PRESSURE: 145 MMHG | HEART RATE: 77 BPM | HEIGHT: 65 IN | WEIGHT: 223 LBS | BODY MASS INDEX: 37.15 KG/M2 | DIASTOLIC BLOOD PRESSURE: 83 MMHG | RESPIRATION RATE: 20 BRPM

## 2022-03-28 DIAGNOSIS — G89.29 CHRONIC BILATERAL LOW BACK PAIN WITH LEFT-SIDED SCIATICA: ICD-10-CM

## 2022-03-28 DIAGNOSIS — G89.29 CHRONIC PAIN OF LOWER EXTREMITY, UNSPECIFIED LATERALITY: ICD-10-CM

## 2022-03-28 DIAGNOSIS — M79.606 CHRONIC PAIN OF LOWER EXTREMITY, UNSPECIFIED LATERALITY: ICD-10-CM

## 2022-03-28 DIAGNOSIS — M54.42 CHRONIC BILATERAL LOW BACK PAIN WITH LEFT-SIDED SCIATICA: ICD-10-CM

## 2022-03-28 DIAGNOSIS — Z79.899 ENCOUNTER FOR LONG-TERM (CURRENT) USE OF OTHER MEDICATIONS: Primary | ICD-10-CM

## 2022-03-28 DIAGNOSIS — I87.2 VENOUS INSUFFICIENCY: ICD-10-CM

## 2022-03-28 LAB

## 2022-03-28 PROCEDURE — G0481 DRUG TEST DEF 8-14 CLASSES: HCPCS | Mod: ,,, | Performed by: CLINICAL MEDICAL LABORATORY

## 2022-03-28 PROCEDURE — 72202 X-RAY EXAM SI JOINTS 3/> VWS: CPT | Mod: TC

## 2022-03-28 PROCEDURE — G0481 PR DRUG TEST DEF 8-14 CLASSES: ICD-10-PCS | Mod: ,,, | Performed by: CLINICAL MEDICAL LABORATORY

## 2022-03-28 PROCEDURE — 4010F PR ACE/ARB THEARPY RXD/TAKEN: ICD-10-PCS | Mod: CPTII,,, | Performed by: PAIN MEDICINE

## 2022-03-28 PROCEDURE — 72100 X-RAY EXAM L-S SPINE 2/3 VWS: CPT | Mod: 26,,, | Performed by: RADIOLOGY

## 2022-03-28 PROCEDURE — 99215 OFFICE O/P EST HI 40 MIN: CPT | Mod: PBBFAC | Performed by: PAIN MEDICINE

## 2022-03-28 PROCEDURE — 3008F PR BODY MASS INDEX (BMI) DOCUMENTED: ICD-10-PCS | Mod: CPTII,,, | Performed by: PAIN MEDICINE

## 2022-03-28 PROCEDURE — 3077F PR MOST RECENT SYSTOLIC BLOOD PRESSURE >= 140 MM HG: ICD-10-PCS | Mod: CPTII,,, | Performed by: PAIN MEDICINE

## 2022-03-28 PROCEDURE — 1159F PR MEDICATION LIST DOCUMENTED IN MEDICAL RECORD: ICD-10-PCS | Mod: CPTII,,, | Performed by: PAIN MEDICINE

## 2022-03-28 PROCEDURE — 3079F DIAST BP 80-89 MM HG: CPT | Mod: CPTII,,, | Performed by: PAIN MEDICINE

## 2022-03-28 PROCEDURE — 99214 OFFICE O/P EST MOD 30 MIN: CPT | Mod: S$PBB,,, | Performed by: PAIN MEDICINE

## 2022-03-28 PROCEDURE — 72202 XR SACROILIAC JOINTS COMPLETE: ICD-10-PCS | Mod: 26,,, | Performed by: STUDENT IN AN ORGANIZED HEALTH CARE EDUCATION/TRAINING PROGRAM

## 2022-03-28 PROCEDURE — 1159F MED LIST DOCD IN RCRD: CPT | Mod: CPTII,,, | Performed by: PAIN MEDICINE

## 2022-03-28 PROCEDURE — 80305 DRUG TEST PRSMV DIR OPT OBS: CPT | Mod: PBBFAC | Performed by: PAIN MEDICINE

## 2022-03-28 PROCEDURE — 72100 XR LUMBAR SPINE AP AND LATERAL: ICD-10-PCS | Mod: 26,,, | Performed by: RADIOLOGY

## 2022-03-28 PROCEDURE — 72100 X-RAY EXAM L-S SPINE 2/3 VWS: CPT | Mod: TC

## 2022-03-28 PROCEDURE — 3008F BODY MASS INDEX DOCD: CPT | Mod: CPTII,,, | Performed by: PAIN MEDICINE

## 2022-03-28 PROCEDURE — 4010F ACE/ARB THERAPY RXD/TAKEN: CPT | Mod: CPTII,,, | Performed by: PAIN MEDICINE

## 2022-03-28 PROCEDURE — 3077F SYST BP >= 140 MM HG: CPT | Mod: CPTII,,, | Performed by: PAIN MEDICINE

## 2022-03-28 PROCEDURE — 3079F PR MOST RECENT DIASTOLIC BLOOD PRESSURE 80-89 MM HG: ICD-10-PCS | Mod: CPTII,,, | Performed by: PAIN MEDICINE

## 2022-03-28 PROCEDURE — 99214 PR OFFICE/OUTPT VISIT, EST, LEVL IV, 30-39 MIN: ICD-10-PCS | Mod: S$PBB,,, | Performed by: PAIN MEDICINE

## 2022-03-28 PROCEDURE — 72202 X-RAY EXAM SI JOINTS 3/> VWS: CPT | Mod: 26,,, | Performed by: STUDENT IN AN ORGANIZED HEALTH CARE EDUCATION/TRAINING PROGRAM

## 2022-03-28 RX ORDER — ACETAMINOPHEN AND CODEINE PHOSPHATE 300; 30 MG/1; MG/1
1 TABLET ORAL 2 TIMES DAILY
Qty: 60 TABLET | Refills: 0 | Status: SHIPPED | OUTPATIENT
Start: 2022-03-28 | End: 2022-04-27

## 2022-03-28 NOTE — PROGRESS NOTES
"Chronic Pain - New Consult    Referring Physician: Alvina Shi DNP       SUBJECTIVE: Disclaimer: This note has been generated using voice-recognition software. There may be typographical errors that have been missed during proof-reading      Initial encounter:    Maury Mckenzie presents to the clinic for the evaluation of lower back and left leg pain.       46-year-old female returns for re-evaluation of lower back and left lumbar radicular pain.  She was last seen October 2019. She receiveda  left ilioinguinal nerve block for neuralgia and experienced good relief.  She was then [facility] and received 2 procedures for venous insufficiency.  She notes significant improvement after these procedures and  remains under the care of Dr. Yang.  Her lower back pain has returned with radicular symptoms to the left anterior thigh.  She denies recent physical therapy or nerve block injections.  She returns today for medication management and treatment options.    Pain Assessment  Pain Assessment: 0-10  Pain Score:   4  Pain Location: Back  Pain Orientation: Lower  Pain Radiating Towards: down left leg  Pain Descriptors: Throbbing, Aching, Constant  Pain Frequency: Continuous  Pain Onset: Awakened from sleep  Clinical Progression: Gradually worsening  Aggravating Factors: Standing, Walking  Pain Intervention(s): Home medication, Rest      Physical Therapy/Home Exercise: no        Pain Medications:  has a current medication list which includes the following prescription(s): aspirin, celecoxib, clonidine, ergocalciferol, furosemide, levothyroxine, linzess, olmesartan, pravastatin, tizanidine, and acetaminophen-codeine 300-30mg.      Tried in Past:  NSAIDS-yes  TCA-no  SNRI-no  Anti-convulsants-no  Muscle Relaxants-yes  Opioids-no  Benzodiazepines-no     4A"s of Opioid Risk Assessment  Activity: Patient can perform  ADL  Analgesia:  Patient's pain is partially controlled by current medication. "   Aberrant Behavior:  reviewed with no aberrant drug seeking/taking behavior     report:  Reviewed and consistent with medication use as prescribed.    Patient denies suicidal or homicidal ideations    Pain interventional therapy-ilioinguinal blocks in 2019    Chiropractor -no  Acupuncture - no  TENS unit -no  Spinal decompression -no  Joint replacement -no     Review of Systems   Constitutional: Negative.    HENT: Negative.    Eyes: Negative.    Respiratory: Negative.    Cardiovascular: Negative.    Gastrointestinal: Negative.    Endocrine: Negative.    Genitourinary: Negative.    Musculoskeletal: Positive for back pain and leg pain (left leg).   Integumentary:  Negative.   Neurological: Positive for numbness.   Hematological: Negative.    Psychiatric/Behavioral: Negative.              X-Ray Lumbar Spine AP And Lateral  Narrative: EXAMINATION:  XR LUMBAR SPINE AP AND LATERAL    CLINICAL HISTORY:  Lumbago with sciatica, left side    COMPARISON:  Lumbar spine x-ray January 8, 2019    TECHNIQUE:  Frontal and lateral views of the lumbar spine.    FINDINGS:  Minimal dextroconvex curvature of the lumbar spine.  Lumbar vertebral body heights and alignment are otherwise maintained.  Scattered posterior facet arthropathy.  Mild-to-moderate loss of disc space height at L3-4.  Impression: Degenerative change of the lumbar spine as detailed.    Point of Service: [facility]    Electronically signed by: Brant Christine  Date:    03/28/2022  Time:    11:39  X-Ray Sacroiliac Joints Complete  Narrative: EXAMINATION:  XR SACROILIAC JOINTS COMPLETE    CLINICAL HISTORY:  Lumbago with sciatica, left side    TECHNIQUE:  XR SACROILIAC JOINTS COMPLETE    COMPARISON:  Comparisons were reviewed, if available.    FINDINGS:  No fracture/dislocation or sacroiliitis  Impression: As above.    Electronically signed by: Marco Antonio Crawford  Date:    03/28/2022  Time:    11:34         Past Medical History:   Diagnosis Date    Carpal  "tunnel syndrome     Hyperlipidemia     Hypertension     Hypothyroidism     IBS (irritable bowel syndrome)      Past Surgical History:   Procedure Laterality Date    CARDIAC CATHETERIZATION      Dr. Beatty    CARPAL TUNNEL RELEASE Left     Dr. Byers     SECTION  1996    x 1    HYSTERECTOMY      LASER ABLATION Left 2019    Left GSV Laser Ablation performed by Dr. Juan Yang    LASER ABLATION Left 09/10/2021    Procedure: Left distal GSV Remnant Laser Ablation;  Surgeon: Ed Yang DO;  Location: [facility];  Service: Vascular;  Laterality: Left;    Left Ilioinguinal NB Left     DR MCPHERSON    TOTAL ABDOMINAL HYSTERECTOMY  2012    Dr. Christine     Social History     Socioeconomic History    Marital status: Single   Tobacco Use    Smoking status: Never Smoker    Smokeless tobacco: Never Used   Substance and Sexual Activity    Alcohol use: Not Currently    Drug use: Never     Family History   Problem Relation Age of Onset    Hypertension Mother     No Known Problems Father     No Known Problems Sister         2 Living Ages 40 and 22    No Known Problems Brother         4 Living Ages 50, 46, unknown ages on other 2      Review of patient's allergies indicates:  No Known Allergies      OBJECTIVE:  Vitals:    22 1006   BP: (!) 145/83   Pulse: 77   Resp: 20     BP (!) 145/83   Pulse 77   Resp 20   Ht 5' 5" (1.651 m)   Wt 101.2 kg (223 lb)   BMI 37.11 kg/m²   Physical Exam  Vitals and nursing note reviewed.   Constitutional:       General: She is not in acute distress.     Appearance: Normal appearance. She is not ill-appearing, toxic-appearing or diaphoretic.   HENT:      Head: Normocephalic and atraumatic.      Nose: Nose normal.      Mouth/Throat:      Mouth: Mucous membranes are moist.   Eyes:      Extraocular Movements: Extraocular movements intact.      Pupils: Pupils are equal, round, and reactive to light.   Cardiovascular:      Rate and Rhythm: Normal rate " and regular rhythm.      Heart sounds: Normal heart sounds.   Pulmonary:      Effort: Pulmonary effort is normal. No respiratory distress.      Breath sounds: Normal breath sounds. No stridor. No wheezing or rhonchi.   Abdominal:      General: Bowel sounds are normal.      Palpations: Abdomen is soft.   Musculoskeletal:         General: No swelling or deformity.      Cervical back: Normal and normal range of motion. No spasms or tenderness. No pain with movement. Normal range of motion.      Thoracic back: Normal.      Lumbar back: Tenderness and bony tenderness present. No spasms. Decreased range of motion. Negative right straight leg raise test and negative left straight leg raise test. No scoliosis.      Right lower leg: No edema.      Left lower leg: No edema.   Skin:     General: Skin is warm.   Neurological:      General: No focal deficit present.      Mental Status: She is alert and oriented to person, place, and time. Mental status is at baseline.      Cranial Nerves: Cranial nerves are intact. No cranial nerve deficit.      Sensory: Sensation is intact. No sensory deficit.      Motor: No weakness.      Coordination: Coordination normal.      Gait: Gait normal.      Deep Tendon Reflexes: Reflexes are normal and symmetric.   Psychiatric:         Mood and Affect: Mood normal.         Behavior: Behavior normal.            ASSESSMENT: 46 y.o. year old female with pain, consistent with     Encounter Diagnoses   Name Primary?    Chronic pain of lower extremity, unspecified laterality     Encounter for long-term (current) use of other medications Yes    Venous insufficiency     Chronic bilateral low back pain with left-sided sciatica         PLAN:   1. reviewed  2..Addiction, Dependency, Tolerance, Opioid abuse-misuse, Death, Diversion Discussed. Overdose reversal drug Naloxone discussed  2.UDS point of care obtained for new patient evaluation and consultation. We will obtain a definitve UDS for  confirmation.  3. Opioid contract signed today  4.Refill/ Continue medications for pain control and function. Start tylenol #3 for lower back pain and adjust as tolerated.       Requested Prescriptions     Signed Prescriptions Disp Refills    acetaminophen-codeine 300-30mg (TYLENOL #3) 300-30 mg Tab 60 tablet 0     Sig: Take 1 tablet by mouth 2 (two) times a day.     5. Urine drug screen and confirmation testing was ordered as documented on the requisition form in order to verify medication compliance, test for illicit substances.  6. Obtain Xray of the SI joint   7. Start PT 2-3 times a week for 6 weeks     Orders Placed This Encounter   Procedures    X-Ray Sacroiliac Joints Complete     Standing Status:   Future     Number of Occurrences:   1     Standing Expiration Date:   3/28/2023     Order Specific Question:   May the Radiologist modify the order per protocol to meet the clinical needs of the patient?     Answer:   Yes     Order Specific Question:   Release to patient     Answer:   Immediate    Drug Screen Definitive 14, Urine     Standing Status:   Future     Number of Occurrences:   1     Standing Expiration Date:   5/27/2023     Order Specific Question:   Specimen Source     Answer:   Urine    Ambulatory referral/consult to Physical/Occupational Therapy     Standing Status:   Future     Standing Expiration Date:   4/28/2023     Referral Priority:   Routine     Referral Type:   Physical Medicine     Referral Reason:   Specialty Services Required     Requested Specialty:   Physical Therapy     Number of Visits Requested:   1    POCT Urine Drug Screen Presump     Interpretive Information:     Negative:  No drug detected at the cut off level.   Positive:  This result represents presumptive positive for the   tested drug, other substances may yield a positive response other   than the analyte of interest. This result should be utilized for   diagnostic purpose only. Confirmation testing will be performed  upon physician request only.         8. Return in 1 month with me  for re-evaluation and medication refill      The total time spent for evaluation and management on 03/28/2022 including reviewing separately obtained history, performing a medically appropriate exam and evaluation, documenting clinical information in the health record, independently interpreting results and communicating them to the patient/family/caregiver, and ordering medications/tests/procedures was between 15-29 minutes.    The above plan and management options were discussed at length with patient. Patient is in agreement with the above and verbalized understanding. It will be communicated with the referring physician via electronic record, fax, or mail.    Stacy Morales  03/28/2022

## 2022-03-30 LAB
6-ACETYLMORPHINE, URINE (RUSH): NEGATIVE 10 NG/ML
7-AMINOCLONAZEPAM, URINE (RUSH): NEGATIVE 25 NG/ML
A-HYDROXYALPRAZOLAM, URINE (RUSH): NEGATIVE 25 NG/ML
ACETYL FENTANYL, URINE (RUSH): NEGATIVE 2.5 NG/ML
ACETYL NORFENTANYL OXALATE, URINE (RUSH): NEGATIVE 5 NG/ML
AMPHET UR QL SCN: NEGATIVE 100 NG/ML
BENZOYLECGONINE, URINE (RUSH): NEGATIVE 100 NG/ML
BUPRENORPHINE UR QL SCN: NEGATIVE 25 NG/ML
CODEINE, URINE (RUSH): NEGATIVE 25 NG/ML
CREAT UR-MCNC: 195 MG/DL (ref 28–219)
EDDP, URINE (RUSH): NEGATIVE 25 NG/ML
FENTANYL, URINE (RUSH): NEGATIVE 2.5 NG/ML
HYDROCODONE, URINE (RUSH): NEGATIVE 25 NG/ML
HYDROMORPHONE, URINE (RUSH): NEGATIVE 25 NG/ML
LORAZEPAM, URINE (RUSH): NEGATIVE 25 NG/ML
METHADONE UR QL SCN: NEGATIVE 25 NG/ML
METHAMPHET UR QL SCN: NEGATIVE 100 NG/ML
MORPHINE, URINE (RUSH): NEGATIVE 25 NG/ML
NORBUPRENORPHINE, URINE (RUSH): NEGATIVE 25 NG/ML
NORDIAZEPAM, URINE (RUSH): NEGATIVE 25 NG/ML
NORFENTANYL OXALATE, URINE (RUSH): NEGATIVE 5 NG/ML
NORHYDROCODONE, URINE (RUSH): NEGATIVE 50 NG/ML
NOROXYCODONE HCL, URINE (RUSH): NEGATIVE 50 NG/ML
OXAZEPAM, URINE (RUSH): NEGATIVE 25 NG/ML
OXYCODONE UR QL SCN: NEGATIVE 25 NG/ML
OXYMORPHONE, URINE (RUSH): NEGATIVE 25 NG/ML
PH UR STRIP: 8 PH UNITS
SP GR UR STRIP: 1.01
TAPENTADOL, URINE (RUSH): NEGATIVE 25 NG/ML
TEMAZEPAM, URINE (RUSH): NEGATIVE 25 NG/ML
THC-COOH, URINE (RUSH): NEGATIVE 25 NG/ML
TRAMADOL, URINE (RUSH): NEGATIVE 100 NG/ML

## 2022-04-05 ENCOUNTER — OFFICE VISIT (OUTPATIENT)
Dept: FAMILY MEDICINE | Facility: CLINIC | Age: 47
End: 2022-04-05
Payer: MEDICAID

## 2022-04-05 VITALS
SYSTOLIC BLOOD PRESSURE: 135 MMHG | BODY MASS INDEX: 37.09 KG/M2 | HEART RATE: 80 BPM | HEIGHT: 65 IN | TEMPERATURE: 99 F | OXYGEN SATURATION: 98 % | DIASTOLIC BLOOD PRESSURE: 83 MMHG | WEIGHT: 222.63 LBS

## 2022-04-05 DIAGNOSIS — J01.00 ACUTE NON-RECURRENT MAXILLARY SINUSITIS: Primary | ICD-10-CM

## 2022-04-05 DIAGNOSIS — H92.03 ACUTE OTALGIA, BILATERAL: ICD-10-CM

## 2022-04-05 DIAGNOSIS — R05.9 COUGH: ICD-10-CM

## 2022-04-05 PROCEDURE — 99213 OFFICE O/P EST LOW 20 MIN: CPT | Mod: 25,,, | Performed by: NURSE PRACTITIONER

## 2022-04-05 PROCEDURE — 3008F PR BODY MASS INDEX (BMI) DOCUMENTED: ICD-10-PCS | Mod: ,,, | Performed by: NURSE PRACTITIONER

## 2022-04-05 PROCEDURE — 96372 PR INJECTION,THERAP/PROPH/DIAG2ST, IM OR SUBCUT: ICD-10-PCS | Mod: ,,, | Performed by: NURSE PRACTITIONER

## 2022-04-05 PROCEDURE — 96372 THER/PROPH/DIAG INJ SC/IM: CPT | Mod: ,,, | Performed by: NURSE PRACTITIONER

## 2022-04-05 PROCEDURE — 99213 PR OFFICE/OUTPT VISIT, EST, LEVL III, 20-29 MIN: ICD-10-PCS | Mod: 25,,, | Performed by: NURSE PRACTITIONER

## 2022-04-05 PROCEDURE — 3008F BODY MASS INDEX DOCD: CPT | Mod: ,,, | Performed by: NURSE PRACTITIONER

## 2022-04-05 RX ORDER — BETAMETHASONE SODIUM PHOSPHATE AND BETAMETHASONE ACETATE 3; 3 MG/ML; MG/ML
6 INJECTION, SUSPENSION INTRA-ARTICULAR; INTRALESIONAL; INTRAMUSCULAR; SOFT TISSUE
Status: COMPLETED | OUTPATIENT
Start: 2022-04-05 | End: 2022-04-05

## 2022-04-05 RX ORDER — LEVOCETIRIZINE DIHYDROCHLORIDE 5 MG/1
5 TABLET, FILM COATED ORAL NIGHTLY
Qty: 30 TABLET | Refills: 1 | Status: SHIPPED | OUTPATIENT
Start: 2022-04-05

## 2022-04-05 RX ORDER — FLUTICASONE PROPIONATE 50 MCG
1 SPRAY, SUSPENSION (ML) NASAL DAILY
Qty: 11.1 ML | Refills: 0 | Status: SHIPPED | OUTPATIENT
Start: 2022-04-05 | End: 2023-11-15 | Stop reason: SDUPTHER

## 2022-04-05 RX ORDER — CEFTRIAXONE 1 G/1
1 INJECTION, POWDER, FOR SOLUTION INTRAMUSCULAR; INTRAVENOUS
Status: COMPLETED | OUTPATIENT
Start: 2022-04-05 | End: 2022-04-05

## 2022-04-05 RX ORDER — CEFDINIR 300 MG/1
300 CAPSULE ORAL 2 TIMES DAILY
Qty: 14 CAPSULE | Refills: 0 | Status: SHIPPED | OUTPATIENT
Start: 2022-04-05 | End: 2022-04-12

## 2022-04-05 RX ADMIN — BETAMETHASONE SODIUM PHOSPHATE AND BETAMETHASONE ACETATE 6 MG: 3; 3 INJECTION, SUSPENSION INTRA-ARTICULAR; INTRALESIONAL; INTRAMUSCULAR; SOFT TISSUE at 10:04

## 2022-04-05 RX ADMIN — CEFTRIAXONE 1 G: 1 INJECTION, POWDER, FOR SOLUTION INTRAMUSCULAR; INTRAVENOUS at 10:04

## 2022-04-05 NOTE — PROGRESS NOTES
Alvina Shi DNP   [address] [place] [address]     PATIENT NAME: Maury Mckenzie  : 1975  DATE: 22  MRN: [id number]      Billing Provider: Alvina Shi DNP  Level of Service:   Patient PCP Information     Provider PCP Type    Alvina Shi DNP General          Reason for Visit / Chief Complaint: Otalgia (Left ear pain)       Update PCP  Update Chief Complaint         History of Present Illness / Problem Focused Workflow     Maury Mckenzie presents to the clinic with Otalgia (Left ear pain)     HPI    Review of Systems     Review of Systems     Medical / Social / Family History     Past Medical History:   Diagnosis Date    Carpal tunnel syndrome     Hyperlipidemia     Hypertension     Hypothyroidism     IBS (irritable bowel syndrome)        Past Surgical History:   Procedure Laterality Date    CARDIAC CATHETERIZATION      Dr. Beatty    CARPAL TUNNEL RELEASE Left     Dr. Byers     SECTION  1996    x 1    HYSTERECTOMY      LASER ABLATION Left 2019    Left GSV Laser Ablation performed by Dr. Juan Yang    LASER ABLATION Left 09/10/2021    Procedure: Left distal GSV Remnant Laser Ablation;  Surgeon: Ed Yang DO;  Location: [facility];  Service: Vascular;  Laterality: Left;    Left Ilioinguinal NB Left     DR MCPHERSON    TOTAL ABDOMINAL HYSTERECTOMY      Dr. Christine       Social History  Ms.  reports that she has never smoked. She has never used smokeless tobacco. She reports previous alcohol use. She reports that she does not use drugs.    Family History  Ms.'s family history includes Hypertension in her mother; No Known Problems in her brother, father, and sister.    Medications and Allergies     Medications  No outpatient medications have been marked as taking for the 22 encounter (Office Visit) with Alvina Shi DNP.     Current Facility-Administered Medications for the 22 encounter (Office Visit) with  Alvina Shi DNP   Medication Dose Route Frequency Provider Last Rate Last Admin    betamethasone acetate-betamethasone sodium phosphate injection 6 mg  6 mg Intramuscular 1 time in Clinic/HOD Alvina Shi DNP        cefTRIAXone injection 1 g  1 g Intramuscular 1 time in Clinic/HOD Alvina Shi DNP           Allergies  Review of patient's allergies indicates:  No Known Allergies    Physical Examination     Vitals:    04/05/22 1018   BP: 135/83   Pulse: 80   Temp: 98.9 °F (37.2 °C)     Physical Exam  Vitals and nursing note reviewed.   HENT:      Head: Normocephalic.      Ears:      Comments: Fluid level thick mucous noted bilateral L>R     Nose: Rhinorrhea present.      Mouth/Throat:      Mouth: Mucous membranes are moist.      Pharynx: Posterior oropharyngeal erythema present.   Eyes:      Extraocular Movements: Extraocular movements intact.      Conjunctiva/sclera: Conjunctivae normal.      Pupils: Pupils are equal, round, and reactive to light.   Cardiovascular:      Rate and Rhythm: Normal rate and regular rhythm.      Pulses: Normal pulses.      Heart sounds: Normal heart sounds.   Pulmonary:      Effort: Pulmonary effort is normal.      Breath sounds: Normal breath sounds.   Musculoskeletal:      Cervical back: Normal range of motion.   Lymphadenopathy:      Cervical: Cervical adenopathy present.   Skin:     General: Skin is warm and dry.      Capillary Refill: Capillary refill takes less than 2 seconds.   Neurological:      General: No focal deficit present.      Mental Status: She is alert and oriented to person, place, and time. Mental status is at baseline.   Psychiatric:         Mood and Affect: Mood normal.         Behavior: Behavior normal.         Thought Content: Thought content normal.         Judgment: Judgment normal.          Assessment and Plan (including Health Maintenance)      Problem List  Smart Sets  Document Outside HM   :    Plan:         There are no preventive care  reminders to display for this patient.    Problem List Items Addressed This Visit    None     Visit Diagnoses     Acute non-recurrent maxillary sinusitis    -  Primary    Relevant Medications    betamethasone acetate-betamethasone sodium phosphate injection 6 mg    cefTRIAXone injection 1 g          Health Maintenance Topics with due status: Not Due       Topic Last Completion Date    Mammogram 09/08/2021    Lipid Panel 03/07/2022       Future Appointments   Date Time Provider Department Center   4/25/2022  9:00 AM Stacy Morales MD [facility] P[facility]RE [facility]   9/7/2022  9:30 AM Alvina Shi DNP [facility]   9/12/2022 10:45 AM [facility] MAMMO[facility]   9/14/2022 10:15 AM Ed Yang DO O[facility]            Signature:  Alvina Shi DNP      [address] [place] [address]    Date of encounter: 4/5/22

## 2022-04-25 ENCOUNTER — OFFICE VISIT (OUTPATIENT)
Dept: PAIN MEDICINE | Facility: CLINIC | Age: 47
End: 2022-04-25
Payer: MEDICAID

## 2022-04-25 VITALS
HEIGHT: 66 IN | DIASTOLIC BLOOD PRESSURE: 86 MMHG | WEIGHT: 223.81 LBS | HEART RATE: 84 BPM | SYSTOLIC BLOOD PRESSURE: 139 MMHG | BODY MASS INDEX: 35.97 KG/M2

## 2022-04-25 DIAGNOSIS — G89.29 CHRONIC BILATERAL LOW BACK PAIN WITH LEFT-SIDED SCIATICA: Chronic | ICD-10-CM

## 2022-04-25 DIAGNOSIS — I87.2 VENOUS INSUFFICIENCY: Chronic | ICD-10-CM

## 2022-04-25 DIAGNOSIS — M54.42 CHRONIC BILATERAL LOW BACK PAIN WITH LEFT-SIDED SCIATICA: Chronic | ICD-10-CM

## 2022-04-25 DIAGNOSIS — M79.606 CHRONIC PAIN OF LOWER EXTREMITY, UNSPECIFIED LATERALITY: Primary | Chronic | ICD-10-CM

## 2022-04-25 DIAGNOSIS — G89.29 CHRONIC PAIN OF LOWER EXTREMITY, UNSPECIFIED LATERALITY: Primary | Chronic | ICD-10-CM

## 2022-04-25 PROCEDURE — 3079F DIAST BP 80-89 MM HG: CPT | Mod: CPTII,,, | Performed by: PAIN MEDICINE

## 2022-04-25 PROCEDURE — 1159F PR MEDICATION LIST DOCUMENTED IN MEDICAL RECORD: ICD-10-PCS | Mod: CPTII,,, | Performed by: PAIN MEDICINE

## 2022-04-25 PROCEDURE — 4010F ACE/ARB THERAPY RXD/TAKEN: CPT | Mod: CPTII,,, | Performed by: PAIN MEDICINE

## 2022-04-25 PROCEDURE — 3075F SYST BP GE 130 - 139MM HG: CPT | Mod: CPTII,,, | Performed by: PAIN MEDICINE

## 2022-04-25 PROCEDURE — 3008F BODY MASS INDEX DOCD: CPT | Mod: CPTII,,, | Performed by: PAIN MEDICINE

## 2022-04-25 PROCEDURE — 4010F PR ACE/ARB THEARPY RXD/TAKEN: ICD-10-PCS | Mod: CPTII,,, | Performed by: PAIN MEDICINE

## 2022-04-25 PROCEDURE — 99214 OFFICE O/P EST MOD 30 MIN: CPT | Mod: S$PBB,,, | Performed by: PAIN MEDICINE

## 2022-04-25 PROCEDURE — 3079F PR MOST RECENT DIASTOLIC BLOOD PRESSURE 80-89 MM HG: ICD-10-PCS | Mod: CPTII,,, | Performed by: PAIN MEDICINE

## 2022-04-25 PROCEDURE — 3075F PR MOST RECENT SYSTOLIC BLOOD PRESS GE 130-139MM HG: ICD-10-PCS | Mod: CPTII,,, | Performed by: PAIN MEDICINE

## 2022-04-25 PROCEDURE — 3008F PR BODY MASS INDEX (BMI) DOCUMENTED: ICD-10-PCS | Mod: CPTII,,, | Performed by: PAIN MEDICINE

## 2022-04-25 PROCEDURE — 99214 PR OFFICE/OUTPT VISIT, EST, LEVL IV, 30-39 MIN: ICD-10-PCS | Mod: S$PBB,,, | Performed by: PAIN MEDICINE

## 2022-04-25 PROCEDURE — 1159F MED LIST DOCD IN RCRD: CPT | Mod: CPTII,,, | Performed by: PAIN MEDICINE

## 2022-04-25 PROCEDURE — 99214 OFFICE O/P EST MOD 30 MIN: CPT | Mod: PBBFAC | Performed by: PAIN MEDICINE

## 2022-04-25 RX ORDER — ACETAMINOPHEN AND CODEINE PHOSPHATE 300; 30 MG/1; MG/1
1 TABLET ORAL 2 TIMES DAILY
Qty: 60 TABLET | Refills: 0 | Status: SHIPPED | OUTPATIENT
Start: 2022-04-27 | End: 2022-05-27

## 2022-04-25 NOTE — PROGRESS NOTES
She Disclaimer: This note has been generated using voice-recognition software. There may be typographical errors that have been missed during proof-reading        Patient ID: Maury Mckenzie is a 46 y.o. female.      Chief Complaint: Low-back Pain      46-year-old female returns today for re-evaluation of chronic lower back pain. Her pain is tolerable today.  She was unable to start physical therapy due to lack of coverage at [facility] with her her insurance.  She will attempt to seek physical therapy at a different facility.  Tylenol No.  3 is providing some relief and she desires to continue with medication as prescribed.  X-rays of the lumbar spine and sacroiliac joints were reviewed and discussed with patient. She denies any additional changes since the last office visit.              Pain Assessment  Pain Score: 0-No pain  Pain Location: Other (Comment) (lower back)  Pain Descriptors: Aching  Pain Frequency: Intermittent  Clinical Progression: Rapidly improving  Aggravating Factors: Standing  Pain Intervention(s): Medication (See eMAR), Rest      A's of Opioid Risk Assessment  Activity:Patient can  perform ADL.   Analgesia:Patients pain is  controlled by current medication.   Adverse Effects: Patient denies constipation or sedation.  Aberrant Behavior:  reviewed with no aberrant drug seeking/taking behavior.      Patient denies any suicidal or homicidal ideations    Physical Therapy/Home Exercise: no      X-Ray Lumbar Spine AP And Lateral  Narrative: EXAMINATION:  XR LUMBAR SPINE AP AND LATERAL    CLINICAL HISTORY:  Lumbago with sciatica, left side    COMPARISON:  Lumbar spine x-ray January 8, 2019    TECHNIQUE:  Frontal and lateral views of the lumbar spine.    FINDINGS:  Minimal dextroconvex curvature of the lumbar spine.  Lumbar vertebral body heights and alignment are otherwise maintained.  Scattered posterior facet arthropathy.  Mild-to-moderate loss of disc space height at L3-4.  Impression: Degenerative  change of the lumbar spine as detailed.    Point of Service: [facility]    Electronically signed by: Brant Christine  Date:    2022  Time:    11:39  X-Ray Sacroiliac Joints Complete  Narrative: EXAMINATION:  XR SACROILIAC JOINTS COMPLETE    CLINICAL HISTORY:  Lumbago with sciatica, left side    TECHNIQUE:  XR SACROILIAC JOINTS COMPLETE    COMPARISON:  Comparisons were reviewed, if available.    FINDINGS:  No fracture/dislocation or sacroiliitis  Impression: As above.    Electronically signed by: Marco Antonio Crawford  Date:    2022  Time:    11:34      Review of Systems   Constitutional: Negative.    HENT: Negative.    Eyes: Negative.    Respiratory: Negative.    Cardiovascular: Negative.    Gastrointestinal: Negative.    Endocrine: Negative.    Genitourinary: Negative.    Musculoskeletal: Positive for arthralgias, back pain and leg pain (LLE).   Integumentary:  Negative.   Neurological: Positive for numbness (LLE).   Hematological: Negative.    Psychiatric/Behavioral: Negative.              Past Medical History:   Diagnosis Date    Carpal tunnel syndrome     Hyperlipidemia     Hypertension     Hypothyroidism     IBS (irritable bowel syndrome)      Past Surgical History:   Procedure Laterality Date    CARDIAC CATHETERIZATION      Dr. Beatty    CARPAL TUNNEL RELEASE Left     Dr. Byers     SECTION  1996    x 1    HYSTERECTOMY      LASER ABLATION Left 2019    Left GSV Laser Ablation performed by Dr. Juan Yang    LASER ABLATION Left 09/10/2021    Procedure: Left distal GSV Remnant Laser Ablation;  Surgeon: Ed Yang DO;  [facility];  Service: Vascular;  Laterality: Left;    Left Ilioinguinal NB Left     DR MCPHERSON    TOTAL ABDOMINAL HYSTERECTOMY      Dr. Christine     Social History     Socioeconomic History    Marital status: Single   Tobacco Use    Smoking status: Never Smoker    Smokeless tobacco: Never Used   Substance and Sexual Activity     Alcohol use: Not Currently    Drug use: Never     Family History   Problem Relation Age of Onset    Hypertension Mother     No Known Problems Father     No Known Problems Sister         2 Living Ages 40 and 22    No Known Problems Brother         4 Living Ages 50, 46, unknown ages on other 2      Review of patient's allergies indicates:  No Known Allergies  has a current medication list which includes the following prescription(s): acetaminophen-codeine 300-30mg, aspirin, celecoxib, clonidine, ergocalciferol, fluticasone propionate, furosemide, levocetirizine, levothyroxine, linzess, olmesartan, pravastatin, tizanidine, and [START ON 4/27/2022] acetaminophen-codeine 300-30mg.      Objective:  Vitals:    04/25/22 1016   BP: 139/86   Pulse: 84        Physical Exam  Vitals and nursing note reviewed.   Constitutional:       General: She is not in acute distress.     Appearance: Normal appearance. She is not ill-appearing, toxic-appearing or diaphoretic.   HENT:      Head: Normocephalic and atraumatic.      Nose: Nose normal.      Mouth/Throat:      Mouth: Mucous membranes are moist.   Eyes:      Extraocular Movements: Extraocular movements intact.      Pupils: Pupils are equal, round, and reactive to light.   Cardiovascular:      Rate and Rhythm: Normal rate and regular rhythm.      Heart sounds: Normal heart sounds.   Pulmonary:      Effort: Pulmonary effort is normal. No respiratory distress.      Breath sounds: Normal breath sounds. No stridor. No wheezing or rhonchi.   Abdominal:      General: Bowel sounds are normal.      Palpations: Abdomen is soft.   Musculoskeletal:         General: No swelling or deformity.      Cervical back: Normal and normal range of motion. No spasms or tenderness. No pain with movement. Normal range of motion.      Thoracic back: Normal.      Lumbar back: Tenderness and bony tenderness present. No spasms. Decreased range of motion. Negative right straight leg raise test and  negative left straight leg raise test. No scoliosis.      Right lower leg: No edema.      Left lower leg: No edema.   Skin:     General: Skin is warm.   Neurological:      General: No focal deficit present.      Mental Status: She is alert and oriented to person, place, and time. Mental status is at baseline.      Cranial Nerves: Cranial nerves are intact. No cranial nerve deficit.      Sensory: Sensation is intact. No sensory deficit.      Motor: No weakness.      Coordination: Coordination normal.      Gait: Gait normal.      Deep Tendon Reflexes: Reflexes are normal and symmetric.   Psychiatric:         Mood and Affect: Mood normal.         Behavior: Behavior normal.           Assessment:      1. Chronic pain of lower extremity, unspecified laterality    2. Venous insufficiency    3. Chronic bilateral low back pain with left-sided sciatica          Plan:  1. reviewed  2.Addiction, Dependency, Tolerance, Opioid abuse-misuse, Death, Diversion Discussed. Overdose reversal drug Naloxone discussed.  3.Refill/Continue medications for pain control and function       Requested Prescriptions     Signed Prescriptions Disp Refills    acetaminophen-codeine 300-30mg (TYLENOL #3) 300-30 mg Tab 60 tablet 0     Sig: Take 1 tablet by mouth 2 (two) times a day.     4. Start physical therapy at approved facility for chronic lower back pain   5. Consider MRI of the lumbar spine if indicated after completion of physical therapy  6.Follow with ESTUARDO [facility]ague in 1 month for re-evaluation and medication refill         report:  Reviewed and consistent with medication use as prescribed.      The total time spent for evaluation and management on 04/25/2022 including reviewing separately obtained history, performing a medically appropriate exam and evaluation, documenting clinical information in the health record, independently interpreting results and communicating them to the patient/family/caregiver, and ordering  medications/tests/procedures was between 15-29 minutes.    The above plan and management options were discussed at length with patient. Patient is in agreement with the above and verbalized understanding. It will be communicated with the referring physician via electronic record, fax, or mail.

## 2022-04-27 ENCOUNTER — TELEPHONE (OUTPATIENT)
Dept: FAMILY MEDICINE | Facility: CLINIC | Age: 47
End: 2022-04-27
Payer: MEDICAID

## 2022-04-27 NOTE — TELEPHONE ENCOUNTER
----- Message from Marry Diallo sent at 4/26/2022  3:36 PM CDT -----  Regarding: GI REFERRAL  PLEASE SEND A [place] MEDICAID AUTHORIZATION ON THIS PATIENT FOR HER CLINIC VISIT FOR [facility] TO [facility] [phone]    THANK YOU

## 2022-07-07 ENCOUNTER — TELEPHONE (OUTPATIENT)
Dept: FAMILY MEDICINE | Facility: CLINIC | Age: 47
End: 2022-07-07
Payer: MEDICAID

## 2022-07-07 DIAGNOSIS — Z12.11 SCREENING FOR COLON CANCER: Primary | ICD-10-CM

## 2022-07-10 ENCOUNTER — HOSPITAL ENCOUNTER (EMERGENCY)
Facility: HOSPITAL | Age: 47
Discharge: HOME | End: 2022-07-10
Attending: FAMILY MEDICINE
Payer: MEDICAID

## 2022-07-10 VITALS
RESPIRATION RATE: 20 BRPM | SYSTOLIC BLOOD PRESSURE: 160 MMHG | OXYGEN SATURATION: 100 % | WEIGHT: 227 LBS | HEART RATE: 73 BPM | DIASTOLIC BLOOD PRESSURE: 84 MMHG | HEIGHT: 65 IN | BODY MASS INDEX: 37.82 KG/M2 | TEMPERATURE: 99 F

## 2022-07-10 DIAGNOSIS — L02.91 ABSCESS: Primary | ICD-10-CM

## 2022-07-10 PROCEDURE — 99283 EMERGENCY DEPT VISIT LOW MDM: CPT

## 2022-07-10 PROCEDURE — 99283 EMERGENCY DEPT VISIT LOW MDM: CPT | Mod: ,,, | Performed by: FAMILY MEDICINE

## 2022-07-10 PROCEDURE — 99283 PR EMERGENCY DEPT VISIT,LEVEL III: ICD-10-PCS | Mod: ,,, | Performed by: FAMILY MEDICINE

## 2022-07-10 RX ORDER — SULFAMETHOXAZOLE AND TRIMETHOPRIM 800; 160 MG/1; MG/1
1 TABLET ORAL 2 TIMES DAILY
Qty: 14 TABLET | Refills: 0 | Status: SHIPPED | OUTPATIENT
Start: 2022-07-10 | End: 2022-07-17

## 2022-07-10 NOTE — DISCHARGE INSTRUCTIONS
Continue to wash area with soap and water. Keep wound clean and dry. Take all your antibiotics until gone. Follow up with your primary care provider in 3-4 days to ensure the wound is healing.

## 2022-07-10 NOTE — ED TRIAGE NOTES
Pt states that she has a wound to the lower left of her abdomen that has been there for 3 weeks. States that she is not sure if it is a abscess or a spider bite. States that it itches really bad.

## 2022-07-10 NOTE — ED PROVIDER NOTES
Encounter Date: 7/10/2022    SCRIBE #1 NOTE: I, Dorothy Corona, am scribing for, and in the presence of,  Dipti Jensen. I have scribed the entire note.       History     Chief Complaint   Patient presents with    Wound Check     The patient is a 46 year old female with complaints of an abscess. She reports an abscess to the LLQ of her abdomen that appeared 3 weeks ago and has not resolved. She states she does not remember seeing a spider or spider bite but it is possible that a spider bit her. She has been using neosporin and bandages on the area without relief. She denies any history of diabetes. She denies any other complaints or symptoms.    The history is provided by the patient. No  was used.     Review of patient's allergies indicates:  No Known Allergies  Past Medical History:   Diagnosis Date    Carpal tunnel syndrome     Hyperlipidemia     Hypertension     Hypothyroidism     IBS (irritable bowel syndrome)      Past Surgical History:   Procedure Laterality Date    CARDIAC CATHETERIZATION      Dr. Beatty    CARPAL TUNNEL RELEASE Left     Dr. Byers     SECTION  1996    x 1    HYSTERECTOMY      LASER ABLATION Left 2019    Left GSV Laser Ablation performed by Dr. Juan Yang    LASER ABLATION Left 09/10/2021    Procedure: Left distal GSV Remnant Laser Ablation;  Surgeon: Ed Yang DO;  Location: [facility] OR;  Service: Vascular;  Laterality: Left;    Left Ilioinguinal NB Left     DR MCPHERSON    TOTAL ABDOMINAL HYSTERECTOMY      Dr. Christine     Family History   Problem Relation Age of Onset    Hypertension Mother     No Known Problems Father     No Known Problems Sister         2 Living Ages 40 and 22    No Known Problems Brother         4 Living Ages 50, 46, unknown ages on other 2      Social History     Tobacco Use    Smoking status: Never Smoker    Smokeless tobacco: Never Used   Substance Use Topics    Alcohol use: Not Currently    Drug  use: Never     Review of Systems   Constitutional: Negative for fever.   Skin: Positive for wound (LLQ of the abdomen).   All other systems reviewed and are negative.      Physical Exam     Initial Vitals [07/10/22 1635]   BP Pulse Resp Temp SpO2   (!) 160/84 73 20 98.6 °F (37 °C) 100 %      MAP       --         Physical Exam    Constitutional: She appears well-developed and well-nourished.   HENT:   Head: Normocephalic and atraumatic.   Eyes: Conjunctivae and EOM are normal. Pupils are equal, round, and reactive to light.   Neck: Neck supple.   Normal range of motion.  Cardiovascular: Normal rate and regular rhythm.   Pulmonary/Chest: Breath sounds normal.   Musculoskeletal:         General: Normal range of motion.      Cervical back: Normal range of motion and neck supple.     Neurological: She is alert and oriented to person, place, and time.   Skin: Skin is warm and dry. Capillary refill takes less than 2 seconds. Abscess (1 cm x 1 cm with an area of enduration surrounding) noted.   Psychiatric: She has a normal mood and affect. Thought content normal.         ED Course   Procedures  Labs Reviewed - No data to display       Imaging Results    None          Medications - No data to display             Attending Attestation:           Physician Attestation for Scribe:  Physician Attestation Statement for Scribe #1: I, Dipti Jensen, reviewed documentation, as scribed by Dorothy Corona in my presence, and it is both accurate and complete.                      Clinical Impression:   Final diagnoses:  [L02.91] Abscess (Primary)          ED Disposition Condition    Discharge Stable        ED Prescriptions     Medication Sig Dispense Start Date End Date Auth. Provider    sulfamethoxazole-trimethoprim 800-160mg (BACTRIM DS) 800-160 mg Tab Take 1 tablet by mouth 2 (two) times daily. for 7 days 14 tablet 7/10/2022 7/17/2022 Dipti Jensen MD        Follow-up Information    None          Dipti Jensen MD  07/10/22  1751

## 2022-07-11 PROBLEM — J01.00 ACUTE NON-RECURRENT MAXILLARY SINUSITIS: Status: RESOLVED | Noted: 2022-04-05 | Resolved: 2022-07-11

## 2022-07-17 NOTE — PROGRESS NOTES
She Disclaimer: This note has been generated using voice-recognition software. There may be typographical errors that have been missed during proof-reading        Patient ID: Maury Mckenzie is a 46 y.o. female.      Chief Complaint: Low-back Pain      46-year-old female returns with re-evaluation of ongoing chronic lower back pain.  She has been unable to start physical therapy due to insurance coverage.  Her pain remains unchanged and Tylenol #3 is providing some relief.  Anti-inflammatories are so providing some benefit.  She returns today for medication refill.  She denies any changes since the last office visit.              Pain Assessment  Pain Score: 0-No pain  Pain Location: Other (Comment) (lower back)  Pain Descriptors: Sharp  Pain Frequency: Intermittent  Clinical Progression: Gradually improving  Aggravating Factors: Standing, Walking, Other (Comment) (sitting)  Pain Intervention(s): Medication (See eMAR), Rest      A's of Opioid Risk Assessment  Activity:Patient can perform ADL.   Analgesia:Patients pain is partially controlled by current medication.   Adverse Effects: Patient denies constipation or sedation.  Aberrant Behavior:  reviewed with no aberrant drug seeking/taking behavior.      Patient denies any suicidal or homicidal ideations    Physical Therapy/Home Exercise: no      X-Ray Lumbar Spine AP And Lateral  Narrative: EXAMINATION:  XR LUMBAR SPINE AP AND LATERAL    CLINICAL HISTORY:  Lumbago with sciatica, left side    COMPARISON:  Lumbar spine x-ray January 8, 2019    TECHNIQUE:  Frontal and lateral views of the lumbar spine.    FINDINGS:  Minimal dextroconvex curvature of the lumbar spine.  Lumbar vertebral body heights and alignment are otherwise maintained.  Scattered posterior facet arthropathy.  Mild-to-moderate loss of disc space height at L3-4.  Impression: Degenerative change of the lumbar spine as detailed.    Point of Service: [facility]    Electronically signed  by: Brant Christine  Date:    2022  Time:    11:39  X-Ray Sacroiliac Joints Complete  Narrative: EXAMINATION:  XR SACROILIAC JOINTS COMPLETE    CLINICAL HISTORY:  Lumbago with sciatica, left side    TECHNIQUE:  XR SACROILIAC JOINTS COMPLETE    COMPARISON:  Comparisons were reviewed, if available.    FINDINGS:  No fracture/dislocation or sacroiliitis  Impression: As above.    Electronically signed by: Marco Antonio Crawford  Date:    2022  Time:    11:34      Review of Systems   Constitutional: Negative.    HENT: Negative.    Eyes: Negative.    Respiratory: Negative.    Cardiovascular: Negative.    Gastrointestinal: Negative.    Endocrine: Negative.    Genitourinary: Negative.    Musculoskeletal: Positive for arthralgias, back pain and leg pain.   Integumentary:  Negative.   Neurological: Positive for numbness.   Hematological: Negative.    Psychiatric/Behavioral: Negative.              Past Medical History:   Diagnosis Date    Carpal tunnel syndrome     Hyperlipidemia     Hypertension     Hypothyroidism     IBS (irritable bowel syndrome)      Past Surgical History:   Procedure Laterality Date    CARDIAC CATHETERIZATION      Dr. Beatty    CARPAL TUNNEL RELEASE Left     Dr. Byers     SECTION  1996    x 1    HYSTERECTOMY      LASER ABLATION Left 2019    Left GSV Laser Ablation performed by Dr. Juan Yang    LASER ABLATION Left 09/10/2021    Procedure: Left distal GSV Remnant Laser Ablation;  Surgeon: Ed Yang DO;  Location: [facility];  Service: Vascular;  Laterality: Left;    Left Ilioinguinal NB Left     DR MCPHERSON    TOTAL ABDOMINAL HYSTERECTOMY      Dr. Christine     Social History     Socioeconomic History    Marital status: Single   Tobacco Use    Smoking status: Never Smoker    Smokeless tobacco: Never Used   Substance and Sexual Activity    Alcohol use: Not Currently    Drug use: Never     Family History   Problem Relation Age of Onset    Hypertension  Mother     No Known Problems Father     No Known Problems Sister         2 Living Ages 40 and 22    No Known Problems Brother         4 Living Ages 50, 46, unknown ages on other 2      Review of patient's allergies indicates:  No Known Allergies  has a current medication list which includes the following prescription(s): aspirin, celecoxib, clonidine, ergocalciferol, fluticasone propionate, furosemide, levocetirizine, linzess, olmesartan, pravastatin, tizanidine, acetaminophen-codeine 300-30mg, and levothyroxine.      Objective:  Vitals:    07/18/22 1029   BP: 139/68   Pulse: 74        Physical Exam  Vitals and nursing note reviewed.   Constitutional:       General: She is not in acute distress.     Appearance: Normal appearance. She is not ill-appearing, toxic-appearing or diaphoretic.   HENT:      Head: Normocephalic and atraumatic.      Nose: Nose normal.      Mouth/Throat:      Mouth: Mucous membranes are moist.   Eyes:      Extraocular Movements: Extraocular movements intact.      Pupils: Pupils are equal, round, and reactive to light.   Cardiovascular:      Rate and Rhythm: Normal rate and regular rhythm.      Heart sounds: Normal heart sounds.   Pulmonary:      Effort: Pulmonary effort is normal. No respiratory distress.      Breath sounds: Normal breath sounds. No stridor. No wheezing or rhonchi.   Abdominal:      General: Bowel sounds are normal.      Palpations: Abdomen is soft.   Musculoskeletal:         General: No swelling or deformity.      Cervical back: Normal and normal range of motion. No spasms or tenderness. No pain with movement. Normal range of motion.      Thoracic back: Normal.      Lumbar back: Tenderness and bony tenderness present. No spasms. Decreased range of motion. Negative right straight leg raise test and negative left straight leg raise test. No scoliosis.      Right lower leg: No edema.      Left lower leg: No edema.   Skin:     General: Skin is warm.   Neurological:       General: No focal deficit present.      Mental Status: She is alert and oriented to person, place, and time. Mental status is at baseline.      Cranial Nerves: No cranial nerve deficit.      Sensory: Sensation is intact. No sensory deficit.      Motor: No weakness.      Coordination: Coordination normal.      Gait: Gait normal.      Deep Tendon Reflexes: Reflexes are normal and symmetric.   Psychiatric:         Mood and Affect: Mood normal.         Behavior: Behavior normal.           Assessment:      1. Venous insufficiency    2. Chronic bilateral low back pain with left-sided sciatica    3. Chronic pain of lower extremity, unspecified laterality    4. Encounter for long-term (current) use of other medications          Plan:  1. reviewed  2.Addiction, Dependency, Tolerance, Opioid abuse-misuse, Death, Diversion Discussed. Overdose reversal drug Naloxone discussed.  3.Refill/Continue medications for pain control and function       Requested Prescriptions     Signed Prescriptions Disp Refills    acetaminophen-codeine 300-30mg (TYLENOL #3) 300-30 mg Tab 60 tablet 1     Sig: Take 1 tablet by mouth 2 (two) times daily as needed (pain).     4. Start physical therapy 2 to 3 times week x6 weeks for chronic lower back pain at [facility]  5.Urine drug screen point of care obtained and consistent with prescribed medications and medication refill date    Orders Placed This Encounter   Procedures    Ambulatory referral/consult to Physical/Occupational Therapy     Standing Status:   Future     Standing Expiration Date:   8/18/2023     Referral Priority:   Routine     Referral Type:   Physical Medicine     Referral Reason:   Specialty Services Required     Number of Visits Requested:   1    POCT Urine Drug Screen Presump     Interpretive Information:     Negative:  No drug detected at the cut off level.   Positive:  This result represents presumptive positive for the   tested drug, other substances may yield a positive  response other   than the analyte of interest. This result should be utilized for   diagnostic purpose only. Confirmation testing will be performed upon physician request only.         6. Follow with ESTUARDO Montague in 2 months for re-evaluation and medication refill  7. Consider MRI of the lumbar spine after completion of physical therapy and if indicated  .     report:  Reviewed and consistent with medication use as prescribed.      The total time spent for evaluation and management on 07/18/2022 including reviewing separately obtained history, performing a medically appropriate exam and evaluation, documenting clinical information in the health record, independently interpreting results and communicating them to the patient/family/caregiver, and ordering medications/tests/procedures was between 15-29 minutes.    The above plan and management options were discussed at length with patient. Patient is in agreement with the above and verbalized understanding. It will be communicated with the referring physician via electronic record, fax, or mail.

## 2022-07-18 ENCOUNTER — OFFICE VISIT (OUTPATIENT)
Dept: PAIN MEDICINE | Facility: CLINIC | Age: 47
End: 2022-07-18
Payer: MEDICAID

## 2022-07-18 VITALS
HEIGHT: 66 IN | BODY MASS INDEX: 35.97 KG/M2 | WEIGHT: 223.81 LBS | HEART RATE: 74 BPM | DIASTOLIC BLOOD PRESSURE: 68 MMHG | SYSTOLIC BLOOD PRESSURE: 139 MMHG

## 2022-07-18 DIAGNOSIS — G89.29 CHRONIC PAIN OF LOWER EXTREMITY, UNSPECIFIED LATERALITY: Chronic | ICD-10-CM

## 2022-07-18 DIAGNOSIS — Z79.899 ENCOUNTER FOR LONG-TERM (CURRENT) USE OF OTHER MEDICATIONS: ICD-10-CM

## 2022-07-18 DIAGNOSIS — M79.606 CHRONIC PAIN OF LOWER EXTREMITY, UNSPECIFIED LATERALITY: Chronic | ICD-10-CM

## 2022-07-18 DIAGNOSIS — G89.29 CHRONIC BILATERAL LOW BACK PAIN WITH LEFT-SIDED SCIATICA: Chronic | ICD-10-CM

## 2022-07-18 DIAGNOSIS — I87.2 VENOUS INSUFFICIENCY: Primary | Chronic | ICD-10-CM

## 2022-07-18 DIAGNOSIS — M54.42 CHRONIC BILATERAL LOW BACK PAIN WITH LEFT-SIDED SCIATICA: Chronic | ICD-10-CM

## 2022-07-18 LAB
CTP QC/QA: YES
POC (AMP) AMPHETAMINE: NEGATIVE
POC (BAR) BARBITURATES: NEGATIVE
POC (BUP) BUPRENORPHINE: NEGATIVE
POC (BZO) BENZODIAZEPINES: ABNORMAL
POC (COC) COCAINE: NEGATIVE
POC (MDMA) METHYLENEDIOXYMETHAMPHETAMINE 3,4: NEGATIVE
POC (MET) METHAMPHETAMINE: NEGATIVE
POC (MOP) OPIATES: NEGATIVE
POC (MTD) METHADONE: NEGATIVE
POC (OXY) OXYCODONE: NEGATIVE
POC (PCP) PHENCYCLIDINE: NEGATIVE
POC (TCA) TRICYCLIC ANTIDEPRESSANTS: NEGATIVE
POC TEMPERATURE (URINE): 92
POC THC: NEGATIVE

## 2022-07-18 PROCEDURE — 3078F DIAST BP <80 MM HG: CPT | Mod: ,,, | Performed by: PAIN MEDICINE

## 2022-07-18 PROCEDURE — 99215 OFFICE O/P EST HI 40 MIN: CPT | Mod: PBBFAC | Performed by: PAIN MEDICINE

## 2022-07-18 PROCEDURE — 80305 DRUG TEST PRSMV DIR OPT OBS: CPT | Mod: PBBFAC | Performed by: PAIN MEDICINE

## 2022-07-18 PROCEDURE — 99214 PR OFFICE/OUTPT VISIT, EST, LEVL IV, 30-39 MIN: ICD-10-PCS | Mod: S$PBB,,, | Performed by: PAIN MEDICINE

## 2022-07-18 PROCEDURE — 99214 OFFICE O/P EST MOD 30 MIN: CPT | Mod: S$PBB,,, | Performed by: PAIN MEDICINE

## 2022-07-18 PROCEDURE — 3008F BODY MASS INDEX DOCD: CPT | Mod: ,,, | Performed by: PAIN MEDICINE

## 2022-07-18 PROCEDURE — 3078F PR MOST RECENT DIASTOLIC BLOOD PRESSURE < 80 MM HG: ICD-10-PCS | Mod: ,,, | Performed by: PAIN MEDICINE

## 2022-07-18 PROCEDURE — 3008F PR BODY MASS INDEX (BMI) DOCUMENTED: ICD-10-PCS | Mod: ,,, | Performed by: PAIN MEDICINE

## 2022-07-18 RX ORDER — ACETAMINOPHEN AND CODEINE PHOSPHATE 300; 30 MG/1; MG/1
1 TABLET ORAL 2 TIMES DAILY PRN
Qty: 60 TABLET | Refills: 1 | Status: SHIPPED | OUTPATIENT
Start: 2022-07-18 | End: 2022-09-16

## 2022-07-27 ENCOUNTER — CLINICAL SUPPORT (OUTPATIENT)
Dept: REHABILITATION | Facility: HOSPITAL | Age: 47
End: 2022-07-27
Attending: PAIN MEDICINE
Payer: MEDICAID

## 2022-07-27 DIAGNOSIS — M54.42 CHRONIC BILATERAL LOW BACK PAIN WITH LEFT-SIDED SCIATICA: Chronic | ICD-10-CM

## 2022-07-27 DIAGNOSIS — G89.29 CHRONIC BILATERAL LOW BACK PAIN WITH LEFT-SIDED SCIATICA: Chronic | ICD-10-CM

## 2022-07-27 PROCEDURE — 97161 PT EVAL LOW COMPLEX 20 MIN: CPT

## 2022-07-27 PROCEDURE — 97162 PT EVAL MOD COMPLEX 30 MIN: CPT

## 2022-07-27 PROCEDURE — 97140 MANUAL THERAPY 1/> REGIONS: CPT

## 2022-07-27 NOTE — PLAN OF CARE
[facility]   Physical Therapy Initial Evaluation     Date: 7/27/2022   Name: Maury Mckenzie  Clinic Number: [id number]    Therapy Diagnosis:   Encounter Diagnosis   Name Primary?    Chronic bilateral low back pain with left-sided sciatica      Physician: Stacy Morales MD    Physician Orders: PT Eval and Treat   Medical Diagnosis from Referral: see above  Evaluation Date: 7/27/2022  Authorization Period Expiration: 7/18/23  Plan of Care Expiration: 9/23/2022  Progress Note Due: 7/27/2022  Visit # / Visits authorized: 1/ based on medical necessity, no limits   FOTO: 58/73    Precautions: Standard     Time In: 1055  Time Out: 1130  Total Appointment Time (timed & untimed codes):35  minutes      SUBJECTIVE     Date of onset: 10 years ago    History of current condition - Maury reports: low back pain for 10 years, becoming more frequent.  Patient has not had any known injury or illness    Falls: no    Imaging, : xray 4/22  Minimal dextroconvex curvature of the lumbar spine.  Lumbar vertebral body heights and alignment are otherwise maintained.  Scattered posterior facet arthropathy.  Mild-to-moderate loss of disc space height at L3-4.  Xray:  SI joint:  Minimal dextroconvex curvature of the lumbar spine.  Lumbar vertebral body heights and alignment are otherwise maintained.  Scattered posterior facet arthropathy.  Mild-to-moderate loss of disc space height at L3-4.  Prior Therapy: no  Social History: unknown   Occupation: hairdresser  Prior Level of Function: independent  Current Level of Function: independent but decreased tolerance with standing tasks, washing dishes, sweeping, mopping    Pain:  Current 5/10, worst 0/10, best 5/10   Location: right  Upper arm  Description: Burning, Grabbing and Numb  Aggravating Factors:   Easing Factors: sitting, lying    Patients goals: get relief from back pain, be able to stand doing hair with less pain.     Medical History:   Past Medical History:    Diagnosis Date    Carpal tunnel syndrome     Hyperlipidemia     Hypertension     Hypothyroidism     IBS (irritable bowel syndrome)        Surgical History:   Maury Mckenzie  has a past surgical history that includes Cardiac catheterization;  section (); Total abdominal hysterectomy (); Carpal tunnel release (Left, ); Laser ablation (Left, 2019); Laser ablation (Left, 09/10/2021); Hysterectomy; and Left Ilioinguinal NB (Left, ).    Medications:   Maury has a current medication list which includes the following prescription(s): acetaminophen-codeine 300-30mg, aspirin, celecoxib, clonidine, ergocalciferol, fluticasone propionate, furosemide, levocetirizine, levothyroxine, linzess, olmesartan, pravastatin, and tizanidine.    Allergies:   Review of patient's allergies indicates:  No Known Allergies       OBJECTIVE          LLE   ROM/STRENGTH   RLE     AROM PROM STENGTH   AROM PROM STRENGTH     WFL 4- Psoas (flexion)   WFL 4-    WFL 5 Quad (knee ext)  WFL 5    WFL 5 Anterior tib  WFL 5    WFL 5 EHL  WFL 5    WFL 5 Gastrocnemius (PF)  WFL 5     WFL 5 Hamstring (knee flexion)   WFL 5      SLR 60-90-(+) left  Lumbar ACTIVE ROM flexion:  Limited approx 50%, / limited approx 50%, SB limited approx 25%, rotation WFL.    Limitation/Restriction for FOTO lumbar Survey    Therapist reviewed FOTO scores for Maury Mckenzie on 2022.   FOTO documents entered into [employer] - see Media section.    Functional Score: 43%         TREATMENT     Total Treatment time (time-based codes) separate from Evaluation: 15 minutes      Maury received the treatments listed below:      manual therapy techniques: Joint mobilizations, Myofacial release and MET and long axis distraction of LEFT LOWER EXTREMITY 15 minutes.    PATIENT EDUCATION AND HOME EXERCISES     Education provided:   Plan of care   eval results     ASSESSMENT     Maury is a 46 y.o. female referred to outpatient Physical Therapy with a medical  diagnosis of .chronic LBP with left sided sciatica. Patient presents with exquisite tenderness B PSIS, decreased ACTIVE ROM lumbar spine, decreased hip flexor strength.    Patient prognosis is Good.   Patient will benefit from skilled outpatient Physical Therapy to address the deficits stated above and in the chart below, provide patient /family education, and to maximize patientt's level of independence.     Plan of care discussed with patient: Yes  Patient's spiritual, cultural and educational needs considered and patient is agreeable to the plan of care and goals as stated below:     Anticipated Barriers for therapy: pain    Goals:  Short Term Goals: 4 weeks   1.  Improve B HAMSTRING length 10*  2.  Improve B hip flexor strength to 4/5  3.  Tolerate 2 hours of hair dressing with <7/10 pain.  Long Term Goals: 8 weeks   1.  Improve B hamstring length 15*  2.  Improve B hip flexor strength to 4+/5  3.  Decrease pain worst from 10/10 to <5/10.  4.  Tolerate 4 hours of hair dressing with <5/10 pain.  PLAN   Plan of care Certification: 7/27/2022 to 9/23/2022.    Outpatient Physical Therapy 2 times weekly for 8 weeks to include the following interventions: Cervical/Lumbar Traction, Electrical Stimulation 0-300 hz, Iontophoresis (with 2.0 ml dexamethasone), Manual Therapy, Moist Heat/ Ice, Neuromuscular Re-ed, Orthotic Management and Training, Patient Education, Self Care, Therapeutic Activities, Therapeutic Exercise and Ultrasound.     ELISSA CEDILLO, PT      I CERTIFY THE NEED FOR THESE SERVICES FURNISHED UNDER THIS PLAN OF TREATMENT AND WHILE UNDER MY CARE   Physician's comments:     Physician's Signature: ___________________________________________________

## 2022-08-01 ENCOUNTER — CLINICAL SUPPORT (OUTPATIENT)
Dept: REHABILITATION | Facility: HOSPITAL | Age: 47
End: 2022-08-01
Payer: MEDICAID

## 2022-08-01 DIAGNOSIS — G89.29 CHRONIC BILATERAL LOW BACK PAIN WITH LEFT-SIDED SCIATICA: Primary | ICD-10-CM

## 2022-08-01 DIAGNOSIS — M54.42 CHRONIC BILATERAL LOW BACK PAIN WITH LEFT-SIDED SCIATICA: Primary | ICD-10-CM

## 2022-08-01 PROCEDURE — 97110 THERAPEUTIC EXERCISES: CPT | Mod: CQ

## 2022-08-01 PROCEDURE — 97140 MANUAL THERAPY 1/> REGIONS: CPT | Mod: CQ

## 2022-08-01 NOTE — PROGRESS NOTES
[facility]   Physical Therapy Treatment Note     Name: Maury Mckenzie  Clinic Number: [id number]  Physician: Stacy Morales MD  Visit Date: 8/1/2022    Therapy Diagnosis:        Encounter Diagnosis   Name Primary?    Chronic bilateral low back pain with left-sided sciatica        Physician: Stacy Morales MD     Physician Orders: PT Eval and Treat   Medical Diagnosis from Referral: see above  Evaluation Date: 7/27/2022  Authorization Period Expiration: 7/18/23  Plan of Care Expiration: 9/23/2022  Progress Note Due: 7/27/2022  Visit # / Visits authorized: 2/ based on medical necessity, no limits   FOTO: 58/73      PTA Visit #: 1/5     Time In: 11:35 am  Time Out: 12:15 pm  Total Billable Time: 40 minutes    SUBJECTIVE     Pt reports: pain in the back and down left leg at the knee. She reports her pain is worse in the evenings  She was given HEP today   Response to previous treatment: first visit since evaluation   Functional change: n/a    Pain: 7/10  Location: left back , buttocks  and lower legs     OBJECTIVE     Objective Measures updated at progress report unless specified.     Treatment     Maury received the treatments listed below:      therapeutic exercises to develop strength, endurance, ROM, flexibility, posture and core stabilization for 30 minutes including:      Back Exercises    Bike/NuStep              nutstep 5 minutes    Calf Stretch Slant board 4 x 20 second hold    Hamstring Stretch Bilateral at step 4 x 15 second hold    Pelvic Tilts 15x 2sh over peanut    Bridging With pelvic tilt over peanut 20x 2 second hold    hooklying Abduction Green tband 20x 2 second hold    Bridging/Hooklying with Marching Green tband 20x bilateral    Bridging/Hooklying with Knee Ext Bilateral with pelvic tilt 10x ea lower extremity    Trunk Rotation Exercise Supine 15x 2 second hold            manual therapy techniques: Myofacial release and Soft tissue Mobilization were applied to the:  lower extremity and lumbar spine for 10 minutes, including:  SKTC bilateral   DKTC  Piriformis bilateral   Manual LTR    Patient Education and Home Exercises     Home Exercises Provided and Patient Education Provided     Education provided:   - home exercise program instruction and demo of understanding by verbal agreement and return demo. All questions answered within scope of practice.     Written Home Exercises Provided: yes. Exercises were reviewed and Maury was able to demonstrate them prior to the end of the session.  Maury demonstrated good  understanding of the education provided. See EMR under Patient Instructions for exercises provided during therapy sessions    ASSESSMENT      supervisory visit with ELISSA CEDILLO, PT prior to initial PTA visit    Maury is a 46 y.o. female referred to outpatient Physical Therapy with a medical diagnosis of .chronic LBP with left sided sciatica. Patient presents with exquisite tenderness B PSIS, decreased ACTIVE ROM lumbar spine, decreased hip flexor strength, and c/o's p! Down LLE to posterior knee. Therapist initiated Plan of Care with form correction and education on pelvic tilt. Gave pt home exercise program handout at end of tx to further increase mobility and decrease stiffness in LLE and left lumbar spine. Pt expressed p! Had centralized to left hip/lumbar at end of tx today. Will progress as able when pt returns.     Maury Is progressing towards her goals.   Pt prognosis is Good.     Pt will continue to benefit from skilled outpatient physical therapy to address the deficits listed in the problem list box on initial evaluation, provide pt/family education and to maximize pt's level of independence in the home and community environment.     Pt's spiritual, cultural and educational needs considered and pt agreeable to plan of care and goals.     Anticipated Barriers for therapy: pain     Goals:  Short Term Goals: 4 weeks   1.  Improve B HAMSTRING length  10*  2.  Improve B hip flexor strength to 4/5  3.  Tolerate 2 hours of hair dressing with <7/10 pain.  Long Term Goals: 8 weeks   1.  Improve B hamstring length 15*  2.  Improve B hip flexor strength to 4+/5  3.  Decrease pain worst from 10/10 to <5/10.  4.  Tolerate 4 hours of hair dressing with <5/10 pain.    PLAN     Plan of care Certification: 7/27/2022 to 9/23/2022.     Outpatient Physical Therapy 2 times weekly for 8 weeks to include the following interventions: Cervical/Lumbar Traction, Electrical Stimulation 0-300 hz, Iontophoresis (with 2.0 ml dexamethasone), Manual Therapy, Moist Heat/ Ice, Neuromuscular Re-ed, Orthotic Management and Training, Patient Education, Self Care, Therapeutic Activities, Therapeutic Exercise and Ultrasound.     Ar Pulido, PTA    8/1/2022

## 2022-08-04 ENCOUNTER — CLINICAL SUPPORT (OUTPATIENT)
Dept: REHABILITATION | Facility: HOSPITAL | Age: 47
End: 2022-08-04
Payer: MEDICAID

## 2022-08-04 DIAGNOSIS — M54.42 CHRONIC BILATERAL LOW BACK PAIN WITH LEFT-SIDED SCIATICA: Primary | ICD-10-CM

## 2022-08-04 DIAGNOSIS — G89.29 CHRONIC BILATERAL LOW BACK PAIN WITH LEFT-SIDED SCIATICA: Primary | ICD-10-CM

## 2022-08-04 PROCEDURE — 97110 THERAPEUTIC EXERCISES: CPT | Mod: CQ

## 2022-08-04 NOTE — PROGRESS NOTES
[facility]   Physical Therapy Treatment Note     Name: Maury Mckenzie  Clinic Number: [id number]  Physician: Stacy Morales MD  Visit Date: 8/4/2022    Therapy Diagnosis:        Encounter Diagnosis   Name Primary?    Chronic bilateral low back pain with left-sided sciatica        Physician: Stacy Morales MD     Physician Orders: PT Eval and Treat   Medical Diagnosis from Referral: see above  Evaluation Date: 7/27/2022  Authorization Period Expiration: 7/18/23  Plan of Care Expiration: 9/23/2022  Progress Note Due: 7/27/2022  Visit # / Visits authorized: 2/ based on medical necessity, no limits   FOTO: 58/73      PTA Visit #: 1/5     Time In: 9:27 am  Time Out: 10:00 pm  Total Billable Time: 33 minutes    SUBJECTIVE     Pt reports: I have been doing my exercises at so I feel good.  She was given HEP today   Response to previous treatment: It was good  Functional change: n/a    Pain: 2/10  Location: left back , buttocks  and lower legs     OBJECTIVE     Objective Measures updated at progress report unless specified.     Treatment     Maury received the treatments listed below:      therapeutic exercises to develop strength, endurance, ROM, flexibility, posture and core stabilization for 33 minutes including:  Bike---  Slant board 4 x 30 seconds   Hamstring stretch 4 x 15 seconds  Ball roll out 5 x 5 seconds each way   Cybex back extension 3 x 10 3#  Cybex Hip abduction 2 x 10 2# each leg   Cybex leg press Bilateral 2 x 10 6#                  Right  Single 2 x 10 3#  Bridges with abduction green TB x 15    manual therapy techniques: Myofacial release and Soft tissue Mobilization were applied to the: lower extremity and lumbar spine for N/A minutes, including:  SKTC bilateral   DKTC  Piriformis bilateral   Manual LTR    Patient Education and Home Exercises     Home Exercises Provided and Patient Education Provided     Education provided:   - home exercise program instruction and demo of  understanding by verbal agreement and return demo. All questions answered within scope of practice.     Written Home Exercises Provided: yes. Exercises were reviewed and Maury was able to demonstrate them prior to the end of the session.  Maury demonstrated good  understanding of the education provided. See EMR under Patient Instructions for exercises provided during therapy sessions    ASSESSMENT      supervisory visit with ELISSA CEDILLO, PT prior to initial PTA visit  Patient arrived to therapy 13 minutes late. Patient had pain in lower back when getting of of leg press machine. She had difficulty perform bridges with abduction today due to pain in lower back. Pt expressed p! Had centralized to left hip/lumbar at end of tx today. Will progress as able when pt returns.     Maury Is progressing towards her goals.   Pt prognosis is Good.     Pt will continue to benefit from skilled outpatient physical therapy to address the deficits listed in the problem list box on initial evaluation, provide pt/family education and to maximize pt's level of independence in the home and community environment.     Pt's spiritual, cultural and educational needs considered and pt agreeable to plan of care and goals.     Anticipated Barriers for therapy: pain     Goals:  Short Term Goals: 4 weeks   1.  Improve B HAMSTRING length 10*  2.  Improve B hip flexor strength to 4/5  3.  Tolerate 2 hours of hair dressing with <7/10 pain.  Long Term Goals: 8 weeks   1.  Improve B hamstring length 15*  2.  Improve B hip flexor strength to 4+/5  3.  Decrease pain worst from 10/10 to <5/10.  4.  Tolerate 4 hours of hair dressing with <5/10 pain.    PLAN     Plan of care Certification: 7/27/2022 to 9/23/2022.     Outpatient Physical Therapy 2 times weekly for 8 weeks to include the following interventions: Cervical/Lumbar Traction, Electrical Stimulation 0-300 hz, Iontophoresis (with 2.0 ml dexamethasone), Manual Therapy, Moist Heat/ Ice,  Neuromuscular Re-ed, Orthotic Management and Training, Patient Education, Self Care, Therapeutic Activities, Therapeutic Exercise and Ultrasound.     Elaina Morris, PTA    8/4/2022

## 2022-08-15 ENCOUNTER — CLINICAL SUPPORT (OUTPATIENT)
Dept: REHABILITATION | Facility: HOSPITAL | Age: 47
End: 2022-08-15
Payer: MEDICAID

## 2022-08-15 DIAGNOSIS — G89.29 CHRONIC BILATERAL LOW BACK PAIN WITH LEFT-SIDED SCIATICA: Primary | ICD-10-CM

## 2022-08-15 DIAGNOSIS — M54.42 CHRONIC BILATERAL LOW BACK PAIN WITH LEFT-SIDED SCIATICA: Primary | ICD-10-CM

## 2022-08-15 PROCEDURE — 97110 THERAPEUTIC EXERCISES: CPT | Mod: CQ

## 2022-08-15 NOTE — PROGRESS NOTES
[facility]   Physical Therapy Treatment Note     Name: Maury Mckenzie  Clinic Number: [id number]  Physician: Stacy Morales MD  Visit Date: 8/15/2022    Therapy Diagnosis:        Encounter Diagnosis   Name Primary?    Chronic bilateral low back pain with left-sided sciatica        Physician: Stacy Morales MD     Physician Orders: PT Eval and Treat   Medical Diagnosis from Referral: see above  Evaluation Date: 7/27/2022  Authorization Period Expiration: 7/18/23  Plan of Care Expiration: 9/23/2022  Progress Note Due: 8/27/2022  Visit # / Visits authorized: 4/ based on medical necessity, no limits   FOTO: 58/73    PTA Visit #: 3/5     Time In: 9:23 am  Time Out: 10:01 am  Total Billable Time: 38 minutes    SUBJECTIVE     Pt reports: I have been doing my exercises at so I feel good, the pain came on Sunday, and I did those which helped relieve the pain.   She was compliant with home exercise program.   Response to previous treatment: It was good, felt better  Functional change: n/a    Pain: 0/10  Location: left back , buttocks  and lower legs     OBJECTIVE     Objective Measures updated at progress report unless specified.     Treatment     Maury received the treatments listed below:      therapeutic exercises to develop strength, endurance, ROM, flexibility, posture and core stabilization for 38 minutes including:  Bike 5 minutes  Slant board 4 x 30 seconds   Hamstring stretch 4 x 15 seconds bilateral lower extremity at step   Ball roll out 5 x 5 seconds each way   Cybex back extension 3 x 10 3 plates  Cybex bilateral Hip abduction 2 x 10 2 plates   Cybex leg press Bilateral 3 x 10 6#  Bridges with abduction green TB x 20  hooklying marches 10x ea Bilateral with green    manual therapy techniques: Myofacial release and Soft tissue Mobilization were applied to the: lower extremity and lumbar spine for N/A minutes, including:  SKTC bilateral   DKTC  Piriformis bilateral   Manual  LTR    Patient Education and Home Exercises     Home Exercises Provided and Patient Education Provided     Education provided:   - home exercise program instruction and demo of understanding by verbal agreement and return demo. All questions answered within scope of practice.     Written Home Exercises Provided: yes. Exercises were reviewed and Maury was able to demonstrate them prior to the end of the session.  Maury demonstrated good  understanding of the education provided. See EMR under Patient Instructions for exercises provided during therapy sessions    ASSESSMENT      supervisory visit with ELISSA CEDILLO, PT prior to initial PTA visit    Patient arrived to therapy 9 minutes late. Patient had no  pain in lower back today and reports she did her home exercise program Sunday when she experienced pain, reports her pain subsided after stretches were performed. Issued blue and green tband for pt to use with home exercise program, per pt's request.  She had no difficulty performing bridges with abduction today a she did as previous tx due to pain in lower back. Will progress as able when pt returns.     Maury Is progressing towards her goals.   Pt prognosis is Good.     Pt will continue to benefit from skilled outpatient physical therapy to address the deficits listed in the problem list box on initial evaluation, provide pt/family education and to maximize pt's level of independence in the home and community environment.     Pt's spiritual, cultural and educational needs considered and pt agreeable to plan of care and goals.     Anticipated Barriers for therapy: pain     Goals:  Short Term Goals: 4 weeks   1.  Improve B HAMSTRING length 10*  2.  Improve B hip flexor strength to 4/5  3.  Tolerate 2 hours of hair dressing with <7/10 pain.  Long Term Goals: 8 weeks   1.  Improve B hamstring length 15*  2.  Improve B hip flexor strength to 4+/5  3.  Decrease pain worst from 10/10 to <5/10.  4.  Tolerate 4  hours of hair dressing with <5/10 pain.    PLAN     Plan of care Certification: 7/27/2022 to 9/23/2022.     Outpatient Physical Therapy 2 times weekly for 8 weeks to include the following interventions: Cervical/Lumbar Traction, Electrical Stimulation 0-300 hz, Iontophoresis (with 2.0 ml dexamethasone), Manual Therapy, Moist Heat/ Ice, Neuromuscular Re-ed, Orthotic Management and Training, Patient Education, Self Care, Therapeutic Activities, Therapeutic Exercise and Ultrasound.     Ar Pulido, PTA    8/15/2022

## 2022-08-29 ENCOUNTER — CLINICAL SUPPORT (OUTPATIENT)
Dept: REHABILITATION | Facility: HOSPITAL | Age: 47
End: 2022-08-29
Payer: MEDICAID

## 2022-08-29 DIAGNOSIS — M54.42 CHRONIC BILATERAL LOW BACK PAIN WITH LEFT-SIDED SCIATICA: Primary | ICD-10-CM

## 2022-08-29 DIAGNOSIS — G89.29 CHRONIC BILATERAL LOW BACK PAIN WITH LEFT-SIDED SCIATICA: Primary | ICD-10-CM

## 2022-08-29 PROCEDURE — 97110 THERAPEUTIC EXERCISES: CPT

## 2022-08-29 NOTE — PROGRESS NOTES
[facility]   Physical Therapy Treatment Note     Name: Maury Mckenzie  Clinic Number: [id number]  Physician: Stacy Morales MD  Visit Date: 8/29/2022    Therapy Diagnosis:        Encounter Diagnosis   Name Primary?    Chronic bilateral low back pain with left-sided sciatica        Physician: Stacy Morales MD     Physician Orders: PT Eval and Treat   Medical Diagnosis from Referral: see above  Evaluation Date: 7/27/2022  Authorization Period Expiration: 7/18/23  Plan of Care Expiration: 9/23/2022  Progress Note Due: 8/27/2022  Visit # / Visits authorized: 5/ based on medical necessity, no limits   FOTO: 58/73    PTA Visit #: 0/5     Time In: 0920  Time Out: 1000  Total Billable Time: 40 minutes    SUBJECTIVE     Pt reports: I have been doing my exercises at so I feel good, the pain came on Sunday, and I did those which helped relieve the pain.   She was compliant with home exercise program.   Response to previous treatment: no complaints  Functional change: n/a    Pain: 5/10  Location: across lower back    OBJECTIVE     Objective Measures updated at progress report unless specified.     Treatment     Maury received the treatments listed below:      therapeutic exercises to develop strength, endurance, ROM, flexibility, posture and core stabilization for  35 minutes including:  Bike 0 minutes  Slant board    Hamstring stretch 4x30sh  Piriformis stretch 4 x 30 sh  Hamstring roll x 30  Ball roll out 10x5sh  Cybex back extension 3 x 10 3 plates  Cybex bilateral Hip abduction 2 x 10 2 plates   Cybex leg press Bilateral 3 x 10 6#  Hooklying hip duction blue x 30  hooklying marches  with tilt; blue x 30  Seated ball flexion left, center, right 10x5sh    manual therapy techniques: Myofacial release and Soft tissue Mobilization were applied to the: lower extremity and lumbar spine for N/A minutes, including:  SKTC bilateral   DKTC  Piriformis bilateral   Manual LTR    Patient Education and Home  Exercises     Home Exercises Provided and Patient Education Provided     Education provided:   - home exercise program instruction and demo of understanding by verbal agreement and return demo. All questions answered within scope of practice.     Written Home Exercises Provided: yes. Exercises were reviewed and Maury was able to demonstrate them prior to the end of the session.  Maury demonstrated good  understanding of the education provided. See EMR under Patient Instructions for exercises provided during therapy sessions    ASSESSMENT   Added to HOME EXERCISE PROGRAM today.  Patient was able to perform therex /s increased pain complaints.  She was able to recruit abdominals well today.     Maury Is progressing towards her goals.   Pt prognosis is Good.     Pt will continue to benefit from skilled outpatient physical therapy to address the deficits listed in the problem list box on initial evaluation, provide pt/family education and to maximize pt's level of independence in the home and community environment.     Pt's spiritual, cultural and educational needs considered and pt agreeable to plan of care and goals.     Anticipated Barriers for therapy: pain     Goals:  Short Term Goals: 4 weeks   1.  Improve B HAMSTRING length 10*.  Goal Met  2.  Improve B hip flexor strength to 4/5.     Goal Met hip flexors 5/5  3.  Tolerate 2 hours of hair dressing with <7/10 pain.  Patient is tolerating standing approx 1 hour with 6/10 pain.  Long Term Goals: 8 weeks   1.  Improve B hamstring length 15*  2.  Improve B hip flexor strength to 4+/5  3.  Decrease pain worst from 10/10 to <5/10.  4.  Tolerate 4 hours of hair dressing with <5/10 pain.    PLAN     Plan of care Certification: 7/27/2022 to 9/23/2022.     Outpatient Physical Therapy 2 times weekly for 8 weeks to include the following interventions: Cervical/Lumbar Traction, Electrical Stimulation 0-300 hz, Iontophoresis (with 2.0 ml dexamethasone), Manual Therapy, Moist  Heat/ Ice, Neuromuscular Re-ed, Orthotic Management and Training, Patient Education, Self Care, Therapeutic Activities, Therapeutic Exercise and Ultrasound.     ELISSA CEDILLO, PT    8/29/2022

## 2022-09-06 ENCOUNTER — CLINICAL SUPPORT (OUTPATIENT)
Dept: REHABILITATION | Facility: HOSPITAL | Age: 47
End: 2022-09-06
Attending: PAIN MEDICINE
Payer: MEDICAID

## 2022-09-06 DIAGNOSIS — G89.29 CHRONIC BILATERAL LOW BACK PAIN WITH LEFT-SIDED SCIATICA: Primary | ICD-10-CM

## 2022-09-06 DIAGNOSIS — M54.42 CHRONIC BILATERAL LOW BACK PAIN WITH LEFT-SIDED SCIATICA: Primary | ICD-10-CM

## 2022-09-06 PROCEDURE — 97110 THERAPEUTIC EXERCISES: CPT | Mod: CQ

## 2022-09-06 NOTE — PLAN OF CARE
[facility]   Physical Therapy Treatment Note      Name: Maury Mckenzie  Clinic Number: [id number]  Physician: Stacy Morales MD  Visit Date: 8/29/2022     Therapy Diagnosis:           Encounter Diagnosis   Name Primary?    Chronic bilateral low back pain with left-sided sciatica        Physician: Stacy Morales MD     Physician Orders: PT Eval and Treat   Medical Diagnosis from Referral: see above  Evaluation Date: 7/27/2022  Authorization Period Expiration: 7/18/23  Plan of Care Expiration: 9/23/2022  Progress Note Due: 8/27/2022  Visit # / Visits authorized: 5/ based on medical necessity, no limits   FOTO: 58/73     PTA Visit #: 0/5      Time In: 0920  Time Out: 1000  Total Billable Time: 40 minutes     SUBJECTIVE      Pt reports: I have been doing my exercises at so I feel good, the pain came on Sunday, and I did those which helped relieve the pain.   She was compliant with home exercise program.   Response to previous treatment: no complaints  Functional change: n/a     Pain: 5/10  Location: across lower back     OBJECTIVE      Objective Measures updated at progress report unless specified.      Treatment      Maury received the treatments listed below:       therapeutic exercises to develop strength, endurance, ROM, flexibility, posture and core stabilization for  35 minutes including:  Bike 0 minutes  Slant board    Hamstring stretch 4x30sh  Piriformis stretch 4 x 30 sh  Hamstring roll x 30  Ball roll out 10x5sh  Cybex back extension 3 x 10 3 plates  Cybex bilateral Hip abduction 2 x 10 2 plates   Cybex leg press Bilateral 3 x 10 6#  Hooklying hip duction blue x 30  hooklying marches  with tilt; blue x 30  Seated ball flexion left, center, right 10x5sh     manual therapy techniques: Myofacial release and Soft tissue Mobilization were applied to the: lower extremity and lumbar spine for N/A minutes, including:  SKTC bilateral   DKTC  Piriformis bilateral   Manual LTR     Patient  Education and Home Exercises      Home Exercises Provided and Patient Education Provided      Education provided:   - home exercise program instruction and demo of understanding by verbal agreement and return demo. All questions answered within scope of practice.      Written Home Exercises Provided: yes. Exercises were reviewed and Maury was able to demonstrate them prior to the end of the session.  Maury demonstrated good  understanding of the education provided. See EMR under Patient Instructions for exercises provided during therapy sessions     ASSESSMENT   Added to HOME EXERCISE PROGRAM today.  Patient was able to perform therex /s increased pain complaints.  She was able to recruit abdominals well today.     Maury Is progressing towards her goals.   Pt prognosis is Good.      Pt will continue to benefit from skilled outpatient physical therapy to address the deficits listed in the problem list box on initial evaluation, provide pt/family education and to maximize pt's level of independence in the home and community environment.      Pt's spiritual, cultural and educational needs considered and pt agreeable to plan of care and goals.     Anticipated Barriers for therapy: pain     Goals:  Short Term Goals: 4 weeks   1.  Improve B HAMSTRING length 10*.  Goal Met  2.  Improve B hip flexor strength to 4/5.     Goal Met hip flexors 5/5  3.  Tolerate 2 hours of hair dressing with <7/10 pain.  Patient is tolerating standing approx 1 hour with 6/10 pain.  Long Term Goals: 8 weeks   1.  Improve B hamstring length 15*  2.  Improve B hip flexor strength to 4+/5  3.  Decrease pain worst from 10/10 to <5/10.  4.  Tolerate 4 hours of hair dressing with <5/10 pain.     PLAN      Plan of care Certification: 7/27/2022 to 9/23/2022.     Outpatient Physical Therapy 2 times weekly for 8 weeks to include the following interventions: Cervical/Lumbar Traction, Electrical Stimulation 0-300 hz, Iontophoresis (with 2.0 ml  dexamethasone), Manual Therapy, Moist Heat/ Ice, Neuromuscular Re-ed, Orthotic Management and Training, Patient Education, Self Care, Therapeutic Activities, Therapeutic Exercise and Ultrasound.      ELISSA CEDILLO, PT    8/29/2022

## 2022-09-06 NOTE — PROGRESS NOTES
[facility]   Physical Therapy Treatment Note     Name: Maury Mckenzie  Clinic Number: [id number]  Physician: Stacy Morales MD  Visit Date: 9/6/2022    Therapy Diagnosis:        Encounter Diagnosis   Name Primary?    Chronic bilateral low back pain with left-sided sciatica         Physician Orders: PT Eval and Treat   Medical Diagnosis from Referral: see above  Evaluation Date: 7/27/2022  Authorization Period Expiration: 7/18/23  Plan of Care Expiration: 9/23/2022  Progress Note Due: 8/27/2022  Visit # / Visits authorized: 6/ based on medical necessity, no limits   FOTO: 58/73    PTA Visit #: 1/5     Time In: 0920 am   Time Out: 10:00 am  Total Billable Time: 40 minutes    SUBJECTIVE     Pt reports: I have been doing my exercises at home so I feel good, pain is 8/10 today in the center of my back. No leg pain.    She was compliant with home exercise program.   Response to previous treatment: no complaints  Functional change: n/a    Pain: 8/10  Location: across lower back    OBJECTIVE     Objective Measures updated at progress report unless specified.     Treatment     Maury received the treatments listed below:      therapeutic exercises to develop strength, endurance, ROM, flexibility, posture and core stabilization for  40 minutes including:  Bike 5 minutes  Slant board  4 x 20 second hold   Hamstring stretch 4 x 30 second hold bilateral   Piriformis stretch 4 x 30 second hold bilateral   Hamstring roll x 30 with peanut - abdominal bracing 2sh   Ball roll out 10x5sh  Heel raises with drop off step 2 x 10   Cybex back extension 3 x 10 3 plates  bilateral Hip abduction 2 x 10 2# wt  Cybex leg press Bilateral 2 x 10 6#  Hooklying hip abduction blue x 30  hooklying marches  with tilt; blue x 30 bilateral   Seated ball flexion left, center, right 10x5sh    manual therapy techniques: Myofacial release and Soft tissue Mobilization were applied to the: lower extremity and lumbar spine for N/A  minutes, including:  SKTC bilateral   DKTC  Piriformis bilateral   Manual LTR    Patient Education and Home Exercises     Home Exercises Provided and Patient Education Provided     Education provided:   - home exercise program instruction and demo of understanding by verbal agreement and return demo. All questions answered within scope of practice.     Written Home Exercises Provided: yes. Exercises were reviewed and Maury was able to demonstrate them prior to the end of the session.  Maury demonstrated good  understanding of the education provided. See EMR under Patient Instructions for exercises provided during therapy sessions    ASSESSMENT   Patient was able to perform therex /s increased pain complaints.  She was able to recruit abdominals well today and expressed when she gets pain at home, the exercises given to her on her home exercise program have helped relieve that.      Maury Is progressing towards her goals.   Pt prognosis is Good.     Pt will continue to benefit from skilled outpatient physical therapy to address the deficits listed in the problem list box on initial evaluation, provide pt/family education and to maximize pt's level of independence in the home and community environment.     Pt's spiritual, cultural and educational needs considered and pt agreeable to plan of care and goals.     Anticipated Barriers for therapy: pain     Goals:  Short Term Goals: 4 weeks   1.  Improve B HAMSTRING length 10*.  Goal Met  2.  Improve B hip flexor strength to 4/5.     Goal Met hip flexors 5/5  3.  Tolerate 2 hours of hair dressing with <7/10 pain.  Patient is tolerating standing approx 1 hour with 6/10 pain.  Long Term Goals: 8 weeks   1.  Improve B hamstring length 15*  2.  Improve B hip flexor strength to 4+/5  3.  Decrease pain worst from 10/10 to <5/10.  4.  Tolerate 4 hours of hair dressing with <5/10 pain.    PLAN     Plan of care Certification: 7/27/2022 to 9/23/2022.     Outpatient Physical  Therapy 2 times weekly for 8 weeks to include the following interventions: Cervical/Lumbar Traction, Electrical Stimulation 0-300 hz, Iontophoresis (with 2.0 ml dexamethasone), Manual Therapy, Moist Heat/ Ice, Neuromuscular Re-ed, Orthotic Management and Training, Patient Education, Self Care, Therapeutic Activities, Therapeutic Exercise and Ultrasound.     Ar Pulido, PTA    9/6/2022

## 2022-09-07 ENCOUNTER — OFFICE VISIT (OUTPATIENT)
Dept: FAMILY MEDICINE | Facility: CLINIC | Age: 47
End: 2022-09-07
Payer: MEDICAID

## 2022-09-07 VITALS
TEMPERATURE: 97 F | DIASTOLIC BLOOD PRESSURE: 73 MMHG | OXYGEN SATURATION: 100 % | WEIGHT: 228.38 LBS | BODY MASS INDEX: 38.05 KG/M2 | SYSTOLIC BLOOD PRESSURE: 136 MMHG | HEART RATE: 72 BPM | HEIGHT: 65 IN | RESPIRATION RATE: 18 BRPM

## 2022-09-07 DIAGNOSIS — M19.90 ARTHRITIS: ICD-10-CM

## 2022-09-07 DIAGNOSIS — I10 HYPERTENSION, UNSPECIFIED TYPE: Primary | ICD-10-CM

## 2022-09-07 DIAGNOSIS — E78.5 HYPERLIPIDEMIA, UNSPECIFIED HYPERLIPIDEMIA TYPE: ICD-10-CM

## 2022-09-07 DIAGNOSIS — E03.9 HYPOTHYROIDISM, UNSPECIFIED TYPE: ICD-10-CM

## 2022-09-07 DIAGNOSIS — N89.8 VAGINAL DISCHARGE: ICD-10-CM

## 2022-09-07 LAB
BACTERIA VAG QL WET PREP: ABNORMAL /HPF
CLUE CELLS VAG QL WET PREP: ABNORMAL /HPF
RBC #/AREA VAG WET PREP: ABNORMAL /HPF
SQUAMOUS EPITHELIALS WET WOUNT, GENITAL: ABNORMAL /HPF
T VAGINALIS VAG QL WET PREP: ABNORMAL /HPF
WBC CLUMPS WET MOUNT, GENITAL: ABNORMAL /HPF
WBC VAG QL WET PREP: ABNORMAL /HPF
YEAST VAG QL WET PREP: ABNORMAL /HPF

## 2022-09-07 PROCEDURE — 87210 WET PREP, GENITAL: ICD-10-PCS | Mod: 59,,, | Performed by: CLINICAL MEDICAL LABORATORY

## 2022-09-07 PROCEDURE — 99214 PR OFFICE/OUTPT VISIT, EST, LEVL IV, 30-39 MIN: ICD-10-PCS | Mod: ,,, | Performed by: NURSE PRACTITIONER

## 2022-09-07 PROCEDURE — 3008F BODY MASS INDEX DOCD: CPT | Mod: ,,, | Performed by: NURSE PRACTITIONER

## 2022-09-07 PROCEDURE — 87661 TRICHOMONAS VAGINALIS BY PCR: ICD-10-PCS | Mod: ,,, | Performed by: CLINICAL MEDICAL LABORATORY

## 2022-09-07 PROCEDURE — 87210 SMEAR WET MOUNT SALINE/INK: CPT | Mod: 59,,, | Performed by: CLINICAL MEDICAL LABORATORY

## 2022-09-07 PROCEDURE — 87591 CHLAMYDIA/GONORRHOEAE(GC), PCR: ICD-10-PCS | Mod: ,,, | Performed by: CLINICAL MEDICAL LABORATORY

## 2022-09-07 PROCEDURE — 87661 TRICHOMONAS VAGINALIS AMPLIF: CPT | Mod: ,,, | Performed by: CLINICAL MEDICAL LABORATORY

## 2022-09-07 PROCEDURE — 3008F PR BODY MASS INDEX (BMI) DOCUMENTED: ICD-10-PCS | Mod: ,,, | Performed by: NURSE PRACTITIONER

## 2022-09-07 PROCEDURE — 87491 CHLAMYDIA/GONORRHOEAE(GC), PCR: ICD-10-PCS | Mod: ,,, | Performed by: CLINICAL MEDICAL LABORATORY

## 2022-09-07 PROCEDURE — 3078F DIAST BP <80 MM HG: CPT | Mod: ,,, | Performed by: NURSE PRACTITIONER

## 2022-09-07 PROCEDURE — 99214 OFFICE O/P EST MOD 30 MIN: CPT | Mod: ,,, | Performed by: NURSE PRACTITIONER

## 2022-09-07 PROCEDURE — 87591 N.GONORRHOEAE DNA AMP PROB: CPT | Mod: ,,, | Performed by: CLINICAL MEDICAL LABORATORY

## 2022-09-07 PROCEDURE — 3078F PR MOST RECENT DIASTOLIC BLOOD PRESSURE < 80 MM HG: ICD-10-PCS | Mod: ,,, | Performed by: NURSE PRACTITIONER

## 2022-09-07 PROCEDURE — 87491 CHLMYD TRACH DNA AMP PROBE: CPT | Mod: ,,, | Performed by: CLINICAL MEDICAL LABORATORY

## 2022-09-07 RX ORDER — TIZANIDINE 4 MG/1
4 TABLET ORAL NIGHTLY PRN
Qty: 60 TABLET | Refills: 1 | Status: SHIPPED | OUTPATIENT
Start: 2022-09-07 | End: 2023-03-07 | Stop reason: SDUPTHER

## 2022-09-07 RX ORDER — ERGOCALCIFEROL 1.25 MG/1
50000 CAPSULE ORAL
Qty: 12 CAPSULE | Refills: 1 | Status: SHIPPED | OUTPATIENT
Start: 2022-09-07 | End: 2023-03-07 | Stop reason: SDUPTHER

## 2022-09-07 RX ORDER — PRAVASTATIN SODIUM 40 MG/1
40 TABLET ORAL NIGHTLY
Qty: 90 TABLET | Refills: 1 | Status: SHIPPED | OUTPATIENT
Start: 2022-09-07 | End: 2023-03-07 | Stop reason: SDUPTHER

## 2022-09-07 RX ORDER — CELECOXIB 200 MG/1
200 CAPSULE ORAL DAILY
Qty: 90 CAPSULE | Refills: 1 | Status: SHIPPED | OUTPATIENT
Start: 2022-09-07 | End: 2023-03-07 | Stop reason: SDUPTHER

## 2022-09-07 RX ORDER — OLMESARTAN MEDOXOMIL 5 MG/1
5 TABLET ORAL DAILY
Qty: 90 TABLET | Refills: 1 | Status: SHIPPED | OUTPATIENT
Start: 2022-09-07 | End: 2023-03-07 | Stop reason: SDUPTHER

## 2022-09-07 RX ORDER — CLONIDINE HYDROCHLORIDE 0.2 MG/1
0.2 TABLET ORAL 2 TIMES DAILY
Qty: 180 TABLET | Refills: 1 | Status: SHIPPED | OUTPATIENT
Start: 2022-09-07 | End: 2023-01-13 | Stop reason: SDUPTHER

## 2022-09-07 RX ORDER — FUROSEMIDE 20 MG/1
20 TABLET ORAL DAILY
Qty: 90 TABLET | Refills: 1 | Status: SHIPPED | OUTPATIENT
Start: 2022-09-07 | End: 2023-03-07 | Stop reason: SDUPTHER

## 2022-09-07 RX ORDER — LEVOTHYROXINE SODIUM 25 UG/1
25 TABLET ORAL
Qty: 90 TABLET | Refills: 1 | Status: SHIPPED | OUTPATIENT
Start: 2022-09-07 | End: 2023-03-07 | Stop reason: SDUPTHER

## 2022-09-07 RX ORDER — LINACLOTIDE 290 UG/1
290 CAPSULE, GELATIN COATED ORAL DAILY
Qty: 90 CAPSULE | Refills: 1 | Status: SHIPPED | OUTPATIENT
Start: 2022-09-07 | End: 2023-09-11 | Stop reason: SDUPTHER

## 2022-09-07 NOTE — PROGRESS NOTES
Alvina Shi DNP   [address] [place] [address]     PATIENT NAME: Maury Mckenzie  : 1975  DATE: 22  MRN: [id number]      Billing Provider: Alvina Shi DNP  Level of Service:   Patient PCP Information       Provider PCP Type    Alvina Shi DNP General            Reason for Visit / Chief Complaint: fu appt       Update PCP  Update Chief Complaint         History of Present Illness / Problem Focused Workflow     Maury Mckenzie presents to the clinic with No chief complaint on file.     HPI    Review of Systems     Review of Systems     Medical / Social / Family History     Past Medical History:   Diagnosis Date    Carpal tunnel syndrome     Hyperlipidemia     Hypertension     Hypothyroidism     IBS (irritable bowel syndrome)        Past Surgical History:   Procedure Laterality Date    CARDIAC CATHETERIZATION      Dr. Beatty    CARPAL TUNNEL RELEASE Left     Dr. Byers     SECTION  1996    x 1    HYSTERECTOMY      LASER ABLATION Left 2019    Left GSV Laser Ablation performed by Dr. Juan Yang    LASER ABLATION Left 09/10/2021    Procedure: Left distal GSV Remnant Laser Ablation;  Surgeon: Ed Yang DO;  Location: [facility];  Service: Vascular;  Laterality: Left;    Left Ilioinguinal NB Left     DR MCPHERSON    TOTAL ABDOMINAL HYSTERECTOMY      Dr. Christine       Social History  Ms.  reports that she has never smoked. She has never used smokeless tobacco. She reports that she does not currently use alcohol. She reports that she does not use drugs.    Family History  Ms.'s family history includes Hypertension in her mother; No Known Problems in her brother, father, and sister.    Medications and Allergies     Medications  No outpatient medications have been marked as taking for the 22 encounter (Appointment) with Alvina Shi DNP.       Allergies  Review of patient's allergies indicates:  No Known  Allergies    Physical Examination   There were no vitals taken for this visit.   Physical Exam  Vitals and nursing note reviewed.   Constitutional:       Appearance: Normal appearance. She is normal weight.   HENT:      Head: Normocephalic.   Eyes:      Pupils: Pupils are equal, round, and reactive to light.   Cardiovascular:      Rate and Rhythm: Normal rate and regular rhythm.      Pulses: Normal pulses.      Heart sounds: Normal heart sounds.   Pulmonary:      Effort: Pulmonary effort is normal.      Breath sounds: Normal breath sounds.   Abdominal:      General: Abdomen is flat. Bowel sounds are normal.      Palpations: Abdomen is soft.   Musculoskeletal:         General: Normal range of motion.      Cervical back: Normal range of motion and neck supple.      Comments: Varicose LLE    Skin:     General: Skin is warm and dry.      Capillary Refill: Capillary refill takes less than 2 seconds.   Neurological:      General: No focal deficit present.      Mental Status: She is alert and oriented to person, place, and time. Mental status is at baseline.   Psychiatric:         Mood and Affect: Mood normal.         Behavior: Behavior normal.         Thought Content: Thought content normal.         Judgment: Judgment normal.        Assessment and Plan (including Health Maintenance)      Problem List  Smart Sets  Document Outside HM   :    Plan:   here for fu appt.        mmg  9/2021 scheduled for next week.   Had pap 4/2021   declined flu and covid vaccines  optho - >2 years,. Number given to call  -     Colonoscopy - no fam hx ordered now since guidelines changed -   use mobic sparingly seen PM in the past.      Nonsmoker. Takes asa 81mg daily.    card - 10/2021 dylan - yearly.   [facility] 10/2021  Keep f/u with Trey. saw alexey 1/2021 q6 months has procedure on 9/9/2021 has appt on 9/16/2022     Keep f/u with Nael for CTS surgery. Left first, then right. last seen 9/2020         Health Maintenance Due    Topic Date Due    Mammogram  09/08/2022       Problem List Items Addressed This Visit    None      Health Maintenance Topics with due status: Not Due       Topic Last Completion Date    Lipid Panel 03/07/2022       Future Appointments   Date Time Provider Department Center   9/7/2022  9:30 AM Alvina Shi DNP [facility] [place]   9/8/2022  9:15 AM Mor Griffin, PTA [facility]   9/12/2022 10:45 AM [facility]   9/13/2022  9:15 AM [facility] [place] [facility]    9/14/2022 10:15 AM Ed Yang,  OB [facility]            Signature:  Alvina Shi DNP      [address] [place] [address]    Date of encounter: 9/7/22

## 2022-09-08 LAB
CHLAMYDIA BY PCR: NEGATIVE
N. GONORRHOEAE (GC) BY PCR: NEGATIVE

## 2022-09-09 LAB — TRICHOMONAS NAT: NEGATIVE

## 2022-09-14 ENCOUNTER — HOSPITAL ENCOUNTER (OUTPATIENT)
Dept: RADIOLOGY | Facility: HOSPITAL | Age: 47
Discharge: HOME | End: 2022-09-14
Attending: FAMILY MEDICINE
Payer: MEDICAID

## 2022-09-14 ENCOUNTER — OFFICE VISIT (OUTPATIENT)
Dept: VASCULAR SURGERY | Facility: CLINIC | Age: 47
End: 2022-09-14
Payer: MEDICAID

## 2022-09-14 ENCOUNTER — HOSPITAL ENCOUNTER (OUTPATIENT)
Dept: RADIOLOGY | Facility: HOSPITAL | Age: 47
Discharge: HOME | End: 2022-09-14
Attending: RADIOLOGY
Payer: MEDICAID

## 2022-09-14 ENCOUNTER — CLINICAL SUPPORT (OUTPATIENT)
Dept: REHABILITATION | Facility: HOSPITAL | Age: 47
End: 2022-09-14
Attending: PAIN MEDICINE
Payer: MEDICAID

## 2022-09-14 VITALS
WEIGHT: 225 LBS | BODY MASS INDEX: 37.49 KG/M2 | DIASTOLIC BLOOD PRESSURE: 76 MMHG | RESPIRATION RATE: 16 BRPM | HEIGHT: 65 IN | SYSTOLIC BLOOD PRESSURE: 138 MMHG | HEART RATE: 84 BPM

## 2022-09-14 DIAGNOSIS — M79.605 PAIN OF LEFT LOWER EXTREMITY: ICD-10-CM

## 2022-09-14 DIAGNOSIS — M79.605 PAIN OF LEFT LOWER EXTREMITY: Primary | ICD-10-CM

## 2022-09-14 DIAGNOSIS — E55.9 VITAMIN D DEFICIENCY: ICD-10-CM

## 2022-09-14 DIAGNOSIS — R60.0 LOWER EXTREMITY EDEMA: ICD-10-CM

## 2022-09-14 DIAGNOSIS — M19.90 ARTHRITIS: ICD-10-CM

## 2022-09-14 DIAGNOSIS — M79.605 LEG PAIN, BILATERAL: Primary | ICD-10-CM

## 2022-09-14 DIAGNOSIS — I87.2 VENOUS INSUFFICIENCY: ICD-10-CM

## 2022-09-14 DIAGNOSIS — Z12.31 ENCOUNTER FOR SCREENING MAMMOGRAM FOR BREAST CANCER: ICD-10-CM

## 2022-09-14 DIAGNOSIS — M79.604 LEG PAIN, BILATERAL: Primary | ICD-10-CM

## 2022-09-14 PROCEDURE — 3008F PR BODY MASS INDEX (BMI) DOCUMENTED: ICD-10-PCS | Mod: ,,, | Performed by: FAMILY MEDICINE

## 2022-09-14 PROCEDURE — 93971 EXTREMITY STUDY: CPT | Mod: 26,,, | Performed by: FAMILY MEDICINE

## 2022-09-14 PROCEDURE — 3008F BODY MASS INDEX DOCD: CPT | Mod: ,,, | Performed by: FAMILY MEDICINE

## 2022-09-14 PROCEDURE — 99214 PR OFFICE/OUTPT VISIT, EST, LEVL IV, 30-39 MIN: ICD-10-PCS | Mod: S$PBB,,, | Performed by: FAMILY MEDICINE

## 2022-09-14 PROCEDURE — 99215 OFFICE O/P EST HI 40 MIN: CPT | Mod: PBBFAC,25 | Performed by: FAMILY MEDICINE

## 2022-09-14 PROCEDURE — 93971 US VENOUS REFLUX STUDY UNILATERAL: ICD-10-PCS | Mod: 26,,, | Performed by: FAMILY MEDICINE

## 2022-09-14 PROCEDURE — 93971 EXTREMITY STUDY: CPT | Mod: TC

## 2022-09-14 PROCEDURE — 3078F DIAST BP <80 MM HG: CPT | Mod: ,,, | Performed by: FAMILY MEDICINE

## 2022-09-14 PROCEDURE — 3078F PR MOST RECENT DIASTOLIC BLOOD PRESSURE < 80 MM HG: ICD-10-PCS | Mod: ,,, | Performed by: FAMILY MEDICINE

## 2022-09-14 PROCEDURE — 99214 OFFICE O/P EST MOD 30 MIN: CPT | Mod: S$PBB,,, | Performed by: FAMILY MEDICINE

## 2022-09-14 PROCEDURE — 97110 THERAPEUTIC EXERCISES: CPT | Mod: CQ

## 2022-09-14 PROCEDURE — 77067 SCR MAMMO BI INCL CAD: CPT | Mod: TC

## 2022-09-14 NOTE — PROGRESS NOTES
Phone call to patient informed that Vitamin D results are normal and to continue with all daily supplements with voiced understanding. -- [email or web address]/sohah.

## 2022-09-14 NOTE — PROGRESS NOTES
[facility] CLINIC NOTE    Patient ID: Maury Mckenzie is a 46 y.o. female.    I. HISTORY     Chief Complaint:   Chief Complaint   Patient presents with    Follow-up     Exam room 3. Follow-up 1 yr PA.       HPI: Maury Mckenzie is a 46 y.o. female who presents today for a 12 month status non thermal post ablation of the left distal great saphenous vein.  The patient states that she continues to have some soreness in the medial aspect of the left lower leg.  She states he can be tender/painful if she bumps into something.  She feels that she has some residual swelling, but overall things have gotten a little better.      The patient underwent a left complete venous reflux study today and the results were discussed with the patient.  This study shows no evidence of DVT in the left lower extremity.  The left great saphenous vein appears to be ablated from the proximal thigh down to the mid calf.  No other superficial venous reflux noted.  Posterior lateral varicosities noted within the calf and knee.    She was noted to have a low vitamin-D level of 11.9 on 01/31/2022 was started on replacement therapy.  Will need to recheck this on proximally May 1st.    Clinical summary :  She underwent a previous CTV on 12/21/2021.  This study shows mild-to-moderate nonspecific narrowing of the bilateral deep veins at the common femoral level which is nonspecific.  No evidence of DVT.  Superficial varices of the bilateral thighs.  Fat containing umbilical hernia, prior hysterectomy, colonic diverticulosis and scattered skeletal degenerative changes.    She underwent previous left distal GSV remnant laser ablation. The patient states that the pain she was having of the varicose veins on her left lateral leg has resolved. Her primary complaint now is as described above. She continues to wear  compression most days. She continues with other conservative measures as well.     Left distal GSV remnant ablation 9-10-21    Past Medical  History:   Diagnosis Date    Carpal tunnel syndrome     Hyperlipidemia     Hypertension     Hypothyroidism     IBS (irritable bowel syndrome)         Past Surgical History:   Procedure Laterality Date    CARDIAC CATHETERIZATION      Dr. Beatty    CARPAL TUNNEL RELEASE Left     Dr. Byers     SECTION  1996    x 1    HYSTERECTOMY      LASER ABLATION Left 2019    Left GSV Laser Ablation performed by Dr. Juan Yang    LASER ABLATION Left 09/10/2021    Procedure: Left distal GSV Remnant Laser Ablation;  Surgeon: Ed Yang DO;  Location: [facility] OR;  Service: Vascular;  Laterality: Left;    Left Ilioinguinal NB Left     DR MCPHERSON    OOPHORECTOMY      TOTAL ABDOMINAL HYSTERECTOMY      Dr. Christine       Social History     Tobacco Use   Smoking Status Never   Smokeless Tobacco Never         Current Outpatient Medications:     acetaminophen-codeine 300-30mg (TYLENOL #3) 300-30 mg Tab, Take 1 tablet by mouth 2 (two) times daily as needed (pain)., Disp: 60 tablet, Rfl: 1    aspirin (ECOTRIN) 81 MG EC tablet, Take 1 tablet (81 mg total) by mouth once daily., Disp: 90 tablet, Rfl: 1    celecoxib (CELEBREX) 200 MG capsule, Take 1 capsule (200 mg total) by mouth once daily., Disp: 90 capsule, Rfl: 1    cloNIDine (CATAPRES) 0.2 MG tablet, Take 1 tablet (0.2 mg total) by mouth 2 (two) times daily., Disp: 180 tablet, Rfl: 1    ergocalciferol (ERGOCALCIFEROL) 50,000 unit Cap, Take 1 capsule (50,000 Units total) by mouth every 7 days., Disp: 12 capsule, Rfl: 1    fluticasone propionate (FLONASE) 50 mcg/actuation nasal spray, 1 spray (50 mcg total) by Each Nostril route once daily., Disp: 11.1 mL, Rfl: 0    furosemide (LASIX) 20 MG tablet, Take 1 tablet (20 mg total) by mouth once daily., Disp: 90 tablet, Rfl: 1    levocetirizine (XYZAL) 5 MG tablet, Take 1 tablet (5 mg total) by mouth every evening., Disp: 30 tablet, Rfl: 1    levothyroxine (SYNTHROID) 25 MCG tablet, Take 1 tablet (25 mcg total) by  mouth before breakfast., Disp: 90 tablet, Rfl: 1    LINZESS 290 mcg Cap capsule, Take 1 capsule (290 mcg total) by mouth once daily., Disp: 90 capsule, Rfl: 1    olmesartan (BENICAR) 5 MG Tab, Take 1 tablet (5 mg total) by mouth once daily., Disp: 90 tablet, Rfl: 1    pravastatin (PRAVACHOL) 40 MG tablet, Take 1 tablet (40 mg total) by mouth every evening., Disp: 90 tablet, Rfl: 1    tiZANidine (ZANAFLEX) 4 MG tablet, Take 1 tablet (4 mg total) by mouth nightly as needed (muscle spasms)., Disp: 60 tablet, Rfl: 1    Review of Systems   Constitutional:  Negative for fatigue and fever.   Eyes:  Negative for pain.   Respiratory:  Negative for chest tightness and shortness of breath.    Cardiovascular:  Positive for leg swelling. Negative for chest pain.   Gastrointestinal:  Negative for abdominal pain.   Musculoskeletal:  Positive for leg pain.        Chronic darker skin changes bilaterally   Neurological:  Negative for syncope.   Psychiatric/Behavioral:  Negative for sleep disturbance.         II. PHYSICAL EXAM     Physical Exam  Vitals reviewed.   Constitutional:       General: She is not in acute distress.     Appearance: She is obese.   HENT:      Head: Normocephalic.   Eyes:      Pupils: Pupils are equal, round, and reactive to light.   Cardiovascular:      Rate and Rhythm: Normal rate and regular rhythm.   Pulmonary:      Effort: Pulmonary effort is normal.   Abdominal:      Palpations: Abdomen is soft.   Musculoskeletal:         General: No swelling. Normal range of motion.      Right lower leg: No edema.      Left lower leg: Edema present.   Skin:     General: Skin is warm and dry.   Neurological:      Mental Status: She is alert and oriented to person, place, and time.     III. ASSESSMENT & PLAN (MEDICAL DECISION MAKING)     1. Pain of left lower extremity    2. Lower extremity edema    3. Venous insufficiency    4. Vitamin D deficiency        Assessment/Diagnosis and Plan:  - Recommend continued compression  with 20-30mmHg Rx stockings  - Therapeutic leg elevation  - Calf pumping exercises  - recheck of vitamin-D level on the way out of clinic today.  Call patient with results.  - RTC in 1 year.    Ed Yang, DO

## 2022-09-14 NOTE — PROGRESS NOTES
[facility] OUTPATIENT THERAPY AND [facility]LLNESS   Physical Therapy Treatment Note     Name: Maury Mckenzie  Clinic Number: [id number]  Physician: Stacy Morales MD  Visit Date: 9/14/2022    Therapy Diagnosis:        Encounter Diagnosis   Name Primary?    Chronic bilateral low back pain with left-sided sciatica         Physician Orders: PT Eval and Treat   Medical Diagnosis from Referral: see above  Evaluation Date: 7/27/2022  Authorization Period Expiration: 7/18/23  Plan of Care Expiration: 9/23/2022  Progress Note Due: 8/27/2022  Visit # / Visits authorized: 7/ based on medical necessity, no limits   FOTO: 58/73    PTA Visit #: 2/5     Time In: 11:09 am   Time Out: 11:55 am  Total Billable Time: 46 minutes    SUBJECTIVE     Pt reports: she arrives late because she had a different appt and came straight from there. She is doing pretty well, her pain is not bad today.   She was compliant with home exercise program.   Response to previous treatment: no complaints  Functional change: n/a    Pain: 2/10  Location: across lower back    OBJECTIVE     Objective Measures updated at progress report unless specified.   Case conference with Yuval, PT, for initial PTA treatment.     Treatment     Maury received the treatments listed below:      therapeutic exercises to develop strength, endurance, ROM, flexibility, posture and core stabilization for  46 minutes including:  Bike 5 minutes  Slant board  4 x 20 second hold   Hamstring stretch 4 x 30 second hold bilateral   Ball roll out 10 x 5 second hold each 3 way  Heel raises with drop off step 2 x 10   Cybex back extension 3 x 10 3 plates  Cybex leg press Bilateral 3 x 10 6 plates   Hooklying hip abduction blue x 30  hooklying marches with tilt; blue x 30 bilateral   Piriformis stretch 4 x 30 second hold bilateral   Hamstring roll x 30 with peanut - abdominal bracing 2 second hold      manual therapy techniques: Myofacial release and Soft tissue Mobilization were applied to the:  lower extremity and lumbar spine for N/A minutes, including:  SKTC bilateral   DKTC  Piriformis bilateral   Manual LTR    Patient Education and Home Exercises     Home Exercises Provided and Patient Education Provided     Education provided:   - home exercise program instruction and demo of understanding by verbal agreement and return demo. All questions answered within scope of practice.     Written Home Exercises Provided: yes. Exercises were reviewed and Maury was able to demonstrate them prior to the end of the session.  Maury demonstrated good  understanding of the education provided. See EMR under Patient Instructions for exercises provided during therapy sessions    ASSESSMENT     Patient reported feeling good after treatment. She arrived with less pain today and was able to complete exercises without increasing it. She maintained good form with most exercises with minimal cueing.     Maury Is progressing towards her goals.   Pt prognosis is Good.     Pt will continue to benefit from skilled outpatient physical therapy to address the deficits listed in the problem list box on initial evaluation, provide pt/family education and to maximize pt's level of independence in the home and community environment.     Pt's spiritual, cultural and educational needs considered and pt agreeable to plan of care and goals.     Anticipated Barriers for therapy: pain     Goals:  Short Term Goals: 4 weeks   1.  Improve B HAMSTRING length 10*.  Goal Met  2.  Improve B hip flexor strength to 4/5.     Goal Met hip flexors 5/5  3.  Tolerate 2 hours of hair dressing with <7/10 pain.  Patient is tolerating standing approx 1 hour with 6/10 pain.  Long Term Goals: 8 weeks   1.  Improve B hamstring length 15*  2.  Improve B hip flexor strength to 4+/5  3.  Decrease pain worst from 10/10 to <5/10.  4.  Tolerate 4 hours of hair dressing with <5/10 pain.    PLAN     Plan of care Certification: 7/27/2022 to 9/23/2022.     Outpatient  Physical Therapy 2 times weekly for 8 weeks to include the following interventions: Cervical/Lumbar Traction, Electrical Stimulation 0-300 hz, Iontophoresis (with 2.0 ml dexamethasone), Manual Therapy, Moist Heat/ Ice, Neuromuscular Re-ed, Orthotic Management and Training, Patient Education, Self Care, Therapeutic Activities, Therapeutic Exercise and Ultrasound.     Maddison Shrestha, PTA    9/14/2022

## 2022-09-27 ENCOUNTER — CLINICAL SUPPORT (OUTPATIENT)
Dept: REHABILITATION | Facility: HOSPITAL | Age: 47
End: 2022-09-27
Attending: PAIN MEDICINE
Payer: MEDICAID

## 2022-09-27 DIAGNOSIS — M79.605 PAIN OF LEFT LOWER EXTREMITY: Primary | ICD-10-CM

## 2022-09-27 PROCEDURE — 97110 THERAPEUTIC EXERCISES: CPT

## 2022-09-27 NOTE — PLAN OF CARE
[facility]   Physical Therapy Treatment Note     Name: Maury Mckenzie  Clinic Number: [id number]  Physician: Stacy Morales MD  Visit Date: 9/27/2022    Therapy Diagnosis:        Encounter Diagnosis   Name Primary?    Chronic bilateral low back pain with left-sided sciatica         Physician Orders: PT Eval and Treat   Medical Diagnosis from Referral: see above  Evaluation Date: 7/27/2022  Authorization Period Expiration: 7/18/23  Plan of Care Expiration: 10/23/22  Progress Note Due: 10/27/2022  Visit # / Visits authorized: 8/ based on medical necessity, no limits   FOTO: 58/73    PTA Visit #: 0/5     Time In: 1050  Time Out: 1130  Total Billable Time: 40 minutes    SUBJECTIVE     Pt reports: back pain is improving, leg pain not so much  She was compliant with home exercise program.   Response to previous treatment: no complaints  Functional change: back pain in standing is better, left leg still hurts    Pain: 0/10  Location: across lower back    OBJECTIVE       Treatment     Maury received the treatments listed below:      therapeutic exercises to develop strength, endurance, ROM, flexibility, posture and core stabilization for  40 minutes including:  Bike   Slant board    LOWER TRUNK ROTATION x 10  Hamstring stretch 4 x 30 second hold bilateral   Hamstring roll x 20  Heel raises with drop off step  Cybex back extension   Cybex leg press Bilateral   Hooklying hip abduction x 30 green   hooklying marches with tilt; blue x 30 bilateral   Piriformis stretch 4 x 30 second hold bilateral   Clam shell green x 30 (B)  (B) piriformis stretch 4 x 30 sh    manual therapy techniques: Myofacial release and Soft tissue Mobilization were applied to the: lower extremity and lumbar spine for N/A minutes, including:    Patient Education and Home Exercises     Home Exercises Provided and Patient Education Provided     Education provided:   - home exercise program-patient performing as  ordered    Written Home Exercises Provided: yes. Exercises were reviewed and Maury was able to demonstrate them prior to the end of the session.  Maury demonstrated good  understanding of the education provided. See EMR under Patient Instructions for exercises provided during therapy sessions    ASSESSMENT     Patient reported feeling good after treatment. She arrived with less pain today and was able to complete exercises without increasing it. She maintained good form with most exercises with minimal cueing.     Maury Is progressing towards her goals.   Pt prognosis is Good.     Pt will continue to benefit from skilled outpatient physical therapy to address the deficits listed in the problem list box on initial evaluation, provide pt/family education and to maximize pt's level of independence in the home and community environment.     Pt's spiritual, cultural and educational needs considered and pt agreeable to plan of care and goals.     Anticipated Barriers for therapy: pain     Goals:  Short Term Goals: 4 weeks   1.  Improve B HAMSTRING length 10*.  Goal Met  2.  Improve B hip flexor strength to 4/5.     Goal Met hip flexors 5/5  3.  Tolerate 2 hours of hair dressing with <7/10 pain.  Patient is tolerating standing approx 2 hour with 5/10 pain.    Long Term Goals: 8 weeks Not   1.  Improve B hamstring length 15*. Not met  2.  Improve B hip flexor strength to 4+/5  Goal met  3.  Decrease pain worst from 10/10 to <5/10. Not met  4.  Tolerate 4 hours of hair dressing with <5/10 pain.patient is tolerating 2 hours of hair dressing with 5/10 pain.    PLAN     Plan of care Certification: 9/27/2022 to 10/23/2022.     Outpatient Physical Therapy 2 times weekly for 4weeks to include the following interventions: Cervical/Lumbar Traction, Electrical Stimulation 0-300 hz, Iontophoresis (with 2.0 ml dexamethasone), Manual Therapy, Moist Heat/ Ice, Neuromuscular Re-ed, Orthotic Management and Training, Patient Education,  Self Care, Therapeutic Activities, Therapeutic Exercise and Ultrasound.     ELISSA CEDILLO, PT    9/27/2022

## 2022-09-29 ENCOUNTER — CLINICAL SUPPORT (OUTPATIENT)
Dept: REHABILITATION | Facility: HOSPITAL | Age: 47
End: 2022-09-29
Attending: PAIN MEDICINE
Payer: MEDICAID

## 2022-09-29 DIAGNOSIS — M79.605 PAIN OF LEFT LOWER EXTREMITY: Primary | ICD-10-CM

## 2022-09-29 PROCEDURE — 97110 THERAPEUTIC EXERCISES: CPT | Mod: CQ

## 2022-09-29 NOTE — PROGRESS NOTES
[facility] [place] OUTPATIENT THERAPY AND [facility]LL[facility]   Physical Therapy Treatment Note     Name: Maury Mckenzie  Clinic Number: [id number]  Physician: Stacy Morales MD  Visit Date: 9/29/2022    Therapy Diagnosis:        Encounter Diagnosis   Name Primary?    Chronic bilateral low back pain with left-sided sciatica         Physician Orders: PT Eval and Treat   Medical Diagnosis from Referral: see above  Evaluation Date: 7/27/2022  Authorization Period Expiration: 7/18/23  Plan of Care Expiration: 10/23/22  Progress Note Due: 10/27/2022  Visit # / Visits authorized: 9/ based on medical necessity, no limits   FOTO: 58/73    PTA Visit #: 1/5     Time In: 10:10 am   Time Out: 10:50 am  Total Billable Time: 40 minutes    SUBJECTIVE     Pt reports: Pain is a 7/10 today.  She was compliant with home exercise program.   Response to previous treatment: no complaints  Functional change: n/a    Pain: 7/10  Location: across lower back    OBJECTIVE     Objective Measures updated at progress report unless specified.   Case conference with Yuval, PT, for initial PTA treatment.     Treatment     Maury received the treatments listed below:      therapeutic exercises to develop strength, endurance, ROM, flexibility, posture and core stabilization for  40 minutes including:  Bike 5 minutes  Slant board  4 x 20 second hold   Hamstring stretch 4 x 30 second hold bilateral   Ball roll out 10 x 5 second hold each 3 way  Heel raises with drop off step 2 x 10   Cybex back extension 3 x 10 3 plates  Cybex leg press Bilateral 3 x 10 6 plates NTV  Hooklying hip abduction blue 2 x 10   hooklying marches with tilt; blue x 30 bilateral   Piriformis stretch 4 x 30 second hold bilateral   Hamstring roll x 30 with peanut - abdominal bracing 2 second hold  NTV    manual therapy techniques: Myofacial release and Soft tissue Mobilization were applied to the: lower extremity and lumbar spine for N/A minutes, including:    SKTC bilateral   DKTC  Piriformis  bilateral   Manual LTR    Patient Education and Home Exercises     Home Exercises Provided and Patient Education Provided     Education provided:   - home exercise program instruction and demo of understanding by verbal agreement and return demo. All questions answered within scope of practice.     Written Home Exercises Provided: yes. Exercises were reviewed and Maury was able to demonstrate them prior to the end of the session.  Maury demonstrated good  understanding of the education provided. See EMR under Patient Instructions for exercises provided during therapy sessions    ASSESSMENT     Patient reported feeling good after treatment. She arrived with less pain today and was able to complete exercises without increasing it. She maintained good form with most exercises with minimal cueing. Will continue to progress patient as tolertaed.     Maury Is progressing towards her goals.   Pt prognosis is Good.     Pt will continue to benefit from skilled outpatient physical therapy to address the deficits listed in the problem list box on initial evaluation, provide pt/family education and to maximize pt's level of independence in the home and community environment.     Pt's spiritual, cultural and educational needs considered and pt agreeable to plan of care and goals.     Anticipated Barriers for therapy: pain     Goals:  Short Term Goals: 4 weeks   1.  Improve B HAMSTRING length 10*.  Goal Met  2.  Improve B hip flexor strength to 4/5.     Goal Met hip flexors 5/5  3.  Tolerate 2 hours of hair dressing with <7/10 pain.  Patient is tolerating standing approx 1 hour with 6/10 pain.  Long Term Goals: 8 weeks   1.  Improve B hamstring length 15*  2.  Improve B hip flexor strength to 4+/5  3.  Decrease pain worst from 10/10 to <5/10.  4.  Tolerate 4 hours of hair dressing with <5/10 pain.    PLAN     Plan of care Certification: 7/27/2022 to 9/23/2022.     Outpatient Physical Therapy 2 times weekly for 8 weeks to  include the following interventions: Cervical/Lumbar Traction, Electrical Stimulation 0-300 hz, Iontophoresis (with 2.0 ml dexamethasone), Manual Therapy, Moist Heat/ Ice, Neuromuscular Re-ed, Orthotic Management and Training, Patient Education, Self Care, Therapeutic Activities, Therapeutic Exercise and Ultrasound.     Elaina Morris, PTA    9/29/2022

## 2022-10-05 ENCOUNTER — CLINICAL SUPPORT (OUTPATIENT)
Dept: REHABILITATION | Facility: HOSPITAL | Age: 47
End: 2022-10-05
Attending: PAIN MEDICINE
Payer: MEDICAID

## 2022-10-05 DIAGNOSIS — M79.604 LEG PAIN, BILATERAL: Primary | ICD-10-CM

## 2022-10-05 DIAGNOSIS — M79.605 LEG PAIN, BILATERAL: Primary | ICD-10-CM

## 2022-10-05 PROCEDURE — 97110 THERAPEUTIC EXERCISES: CPT | Mod: CQ

## 2022-10-05 NOTE — PROGRESS NOTES
[facility] OUTPATIENT THERAPY AND WELLNESS   Physical Therapy Treatment Note     Name: Maury Mckenzie  Clinic Number: [id number]  Physician: Stacy Morales MD  Visit Date: 10/5/2022    Therapy Diagnosis:        Encounter Diagnosis   Name Primary?    Chronic bilateral low back pain with left-sided sciatica         Physician Orders: PT Eval and Treat   Medical Diagnosis from Referral: see above  Evaluation Date: 7/27/2022  Authorization Period Expiration: 7/18/23  Plan of Care Expiration: 10/23/22  Progress Note Due: 10/27/2022  Visit # / Visits authorized: 10/ based on medical necessity, no limits   FOTO: 58/73    PTA Visit #: 2/5     Time In: 10:10 am   Time Out: 10:55 am  Total Billable Time: 40 minutes    SUBJECTIVE     Pt reports: Pain is a 8/10 today.  She was compliant with home exercise program.   Response to previous treatment: no complaints  Functional change: n/a    Pain: 8/10  Location: across lower back    OBJECTIVE     Objective Measures updated at progress report unless specified.   Case conference with Yuval, PT, for initial PTA treatment.     Treatment     Maury received the treatments listed below:      therapeutic exercises to develop strength, endurance, ROM, flexibility, posture and core stabilization for  40 minutes including:  Bike 5 minutes NTV  Slant board  4 x 20 second hold   Hamstring stretch 4 x 30 second hold bilateral   Ball roll out 10 x 5 second hold each 3 way  Heel raises with drop off step 2 x 10   Cybex back extension 3 x 10 3 plates  Cybex leg press Bilateral 3 x 10 6 plates NTV  Hooklying hip abduction blue 2 x 10   hooklying marches with tilt; blue x 30 bilateral   Piriformis stretch 4 x 30 second hold bilateral   Hamstring roll x 30 with peanut - abdominal bracing 2 second hold  NTV    manual therapy techniques: Myofacial release and Soft tissue Mobilization were applied to the: lower extremity and lumbar spine for N/A minutes, including:    SKTC bilateral   DKTC  Piriformis  bilateral   Manual LTR    Patient Education and Home Exercises     Home Exercises Provided and Patient Education Provided     Education provided:   - home exercise program instruction and demo of understanding by verbal agreement and return demo. All questions answered within scope of practice.     Written Home Exercises Provided: yes. Exercises were reviewed and Maury was able to demonstrate them prior to the end of the session.  Maury demonstrated good  understanding of the education provided. See EMR under Patient Instructions for exercises provided during therapy sessions    ASSESSMENT     Patient reported feeling good after treatment. She arrived with less pain today and was able to complete exercises without increasing it. She maintained good form with most exercises with minimal cueing. Will continue to progress patient as tolertaed. Patient had signs of pain throughout treatment session. She also had difficulty getting up from a supine position.     Maury Is progressing towards her goals.   Pt prognosis is Good.     Pt will continue to benefit from skilled outpatient physical therapy to address the deficits listed in the problem list box on initial evaluation, provide pt/family education and to maximize pt's level of independence in the home and community environment.     Pt's spiritual, cultural and educational needs considered and pt agreeable to plan of care and goals.     Anticipated Barriers for therapy: pain     Goals:  Short Term Goals: 4 weeks   1.  Improve B HAMSTRING length 10*.  Goal Met  2.  Improve B hip flexor strength to 4/5.     Goal Met hip flexors 5/5  3.  Tolerate 2 hours of hair dressing with <7/10 pain.  Patient is tolerating standing approx 1 hour with 6/10 pain.  Long Term Goals: 8 weeks   1.  Improve B hamstring length 15*  2.  Improve B hip flexor strength to 4+/5  3.  Decrease pain worst from 10/10 to <5/10.  4.  Tolerate 4 hours of hair dressing with <5/10 pain.    PLAN     Plan   care Certification: 7/27/2022 to 9/23/2022.     Outpatient Physical Therapy 2 times weekly for 8 weeks to include the following interventions: Cervical/Lumbar Traction, Electrical Stimulation 0-300 hz, Iontophoresis (with 2.0 ml dexamethasone), Manual Therapy, Moist Heat/ Ice, Neuromuscular Re-ed, Orthotic Management and Training, Patient Education, Self Care, Therapeutic Activities, Therapeutic Exercise and Ultrasound.     Elaina Morris, PTA    10/5/2022

## 2022-11-01 ENCOUNTER — CLINICAL SUPPORT (OUTPATIENT)
Dept: REHABILITATION | Facility: HOSPITAL | Age: 47
End: 2022-11-01
Attending: PAIN MEDICINE
Payer: MEDICAID

## 2022-11-01 DIAGNOSIS — M79.605 PAIN OF LEFT LOWER EXTREMITY: Primary | ICD-10-CM

## 2022-11-01 PROCEDURE — 97110 THERAPEUTIC EXERCISES: CPT

## 2022-11-01 NOTE — PROGRESS NOTES
[facility] OUTPATIENT THERAPY AND WELLNESS   Physical Therapy Treatment Note     Name: Maury Mckenzie  Clinic Number: [id number]  Physician: Stacy Morales MD  Visit Date: 11/1/2022    Therapy Diagnosis:        Encounter Diagnosis   Name Primary?    Chronic bilateral low back pain with left-sided sciatica         Physician Orders: PT Eval and Treat   Medical Diagnosis from Referral: see above  Evaluation Date: 7/27/2022  Authorization Period Expiration: 7/18/23  Plan of Care Expiration: 10/23/22  Progress Note Due: 10/27/2022  Visit # / Visits authorized: 10/ based on medical necessity, no limits   FOTO: 58/73    Patient has attended 11 of 19 sessions since 7/27/22    PTA Visit #: 0/5     Time In: 1110  Time Out: 1215  Total Billable Time: 40 minutes    SUBJECTIVE     Pt reports: Pain is a 8/10 today.  She was compliant with home exercise program.   Response to previous treatment: no complaints  Functional change: no improvement    Pain: 8/10  Location: across lower back  Treatment     Maury received the treatments listed below:      therapeutic exercises to develop strength, endurance, ROM, flexibility, posture and core stabilization for  40 minutes including:  Slant board  4 x 20 second hold   LOWER TRUNK ROTATION x 10  Pelvic tilt 86b91al  Hamstring stretch 4 x 30 second hold bilateral   Hooklying hip abduction blue 2 x 10   hooklying marches with tilt; blue x 30 bilateral   Piriformis stretch 4 x 30 second hold bilateral   Hamstring roll x 30     Patient Education and Home Exercises     Home Exercises Provided and Patient Education Provided     Education provided:   - home exercise program instruction and demo of understanding by verbal agreement and return demo. All questions answered within scope of practice.     Written Home Exercises Provided: yes. Exercises were reviewed and Maury was able to demonstrate them prior to the end of the session.  Maury demonstrated good  understanding of the  education provided. See EMR under Patient Instructions for exercises provided during therapy sessions    ASSESSMENT   Patient with improved range of motion, strength, but continues to experience left LE radicular symptoms and cramping to left gastroc.      Goals:  Short Term Goals: 4 weeks   1.  Improve B HAMSTRING length 10*.  Goal Met  2.  Improve B hip flexor strength to 4/5.     Goal Met hip flexors 5/5  3.  Tolerate 2 hours of hair dressing with <7/10 pain.  Patient is tolerating standing approx 1 hour with 6/10 pain.  Long Term Goals: 8 weeks   1.  Improve B hamstring length 15* Goal Not Met  2.  Improve B hip flexor strength to 4+/5 Goal Met  3.  Decrease pain worst from 10/10 to <5/10. Not Met  4.  Tolerate 4 hours of hair dressing with <5/10 pain. Not Met    PLAN   Patient with improved range of motion, strength.  Patient experiencing left LE radicular symptoms and cramping to left gastroc.     FOTO intake 58 % function  FOTO D/C  49 % function    ELISSA CEDILLO, PT    11/1/2022

## 2022-11-03 ENCOUNTER — DOCUMENTATION ONLY (OUTPATIENT)
Dept: REHABILITATION | Facility: HOSPITAL | Age: 47
End: 2022-11-03
Payer: MEDICAID

## 2022-11-03 ENCOUNTER — CLINICAL SUPPORT (OUTPATIENT)
Dept: REHABILITATION | Facility: HOSPITAL | Age: 47
End: 2022-11-03
Attending: PAIN MEDICINE
Payer: MEDICAID

## 2022-11-03 DIAGNOSIS — M54.42 CHRONIC BILATERAL LOW BACK PAIN WITH LEFT-SIDED SCIATICA: Primary | ICD-10-CM

## 2022-11-03 DIAGNOSIS — G89.29 CHRONIC BILATERAL LOW BACK PAIN WITH LEFT-SIDED SCIATICA: Primary | ICD-10-CM

## 2022-11-03 PROCEDURE — 97110 THERAPEUTIC EXERCISES: CPT

## 2022-11-03 NOTE — PROGRESS NOTES
[facility] OUTPATIENT THERAPY AND WELLNESS   Physical Therapy Treatment Note     Name: Maury Mckenzie  Clinic Number: [id number]  Physician: Stacy Morales MD  Visit Date: 11/3/2022    Therapy Diagnosis:        Encounter Diagnosis   Name Primary?    Chronic bilateral low back pain with left-sided sciatica         Physician Orders: PT Eval and Treat   Medical Diagnosis from Referral: see above  Evaluation Date: 7/27/2022  Authorization Period Expiration: 7/18/23  Plan of Care Expiration: 10/23/22  Progress Note Due: 10/27/2022  Visit # / Visits authorized: 10/ based on medical necessity, no limits   FOTO: 58/73    Patient has attended 11 of 19 sessions since 7/27/22    PTA Visit #: 0/5     Time In: 1110  Time Out: 1215  Total Billable Time: 40 minutes    SUBJECTIVE     Pt reports: Pain is an 8/10 today.  She was compliant with home exercise program.   Response to previous treatment: no complaints  Functional change: no improvement    Pain: 8/10  Location: across lower back  Treatment     Maury received the treatments listed below:      therapeutic exercises to develop strength, endurance, ROM, flexibility, posture and core stabilization for  40 minutes including:  Slant board  4 x 20 second hold   LOWER TRUNK ROTATION x 10  Pelvic tilt 84x05wr  Hamstring stretch 4 x 30 second hold bilateral   Hooklying hip abduction blue 2 x 10   hooklying marches with tilt; blue x 30 bilateral   Piriformis stretch 4 x 30 second hold bilateral   Hamstring roll x 30     Patient Education and Home Exercises     Home Exercises Provided and Patient Education Provided     Education provided:   - home exercise program instruction and demo of understanding by verbal agreement and return demo. All questions answered within scope of practice.     Written Home Exercises Provided: yes. Exercises were reviewed and Maury was able to demonstrate them prior to the end of the session.  Maury demonstrated good  understanding of the  education provided. See EMR under Patient Instructions for exercises provided during therapy sessions    ASSESSMENT   Patient with improved range of motion, strength, but continues to experience left LE radicular symptoms and cramping to left gastroc.      Goals:  Short Term Goals: 4 weeks   1.  Improve B HAMSTRING length 10*.  Goal Met  2.  Improve B hip flexor strength to 4/5.     Goal Met hip flexors 5/5  3.  Tolerate 2 hours of hair dressing with <7/10 pain.  Patient is tolerating standing approx 1 hour with 6/10 pain.  Long Term Goals: 8 weeks   1.  Improve B hamstring length 15* Goal Not Met  2.  Improve B hip flexor strength to 4+/5 Goal Met  3.  Decrease pain worst from 10/10 to <5/10. Not Met  4.  Tolerate 4 hours of hair dressing with <5/10 pain. Not Met    PLAN   Patient with improved range of motion, strength.  Patient experiencing left LE radicular symptoms and cramping to left gastroc.     FOTO intake 58 % function  FOTO D/C  49 % function    ELISSA CEDILLO, PT    11/3/2022

## 2022-11-03 NOTE — PROGRESS NOTES
Patient arrived for physical therapy today, although patient was discharged 11/01/22.    Kathy Sanabria MSPT  Physical Therapist

## 2022-12-23 ENCOUNTER — HOSPITAL ENCOUNTER (EMERGENCY)
Facility: HOSPITAL | Age: 47
Discharge: HOME | End: 2022-12-23
Attending: EMERGENCY MEDICINE
Payer: MEDICAID

## 2022-12-23 VITALS
SYSTOLIC BLOOD PRESSURE: 149 MMHG | BODY MASS INDEX: 40.32 KG/M2 | DIASTOLIC BLOOD PRESSURE: 74 MMHG | RESPIRATION RATE: 18 BRPM | TEMPERATURE: 98 F | HEIGHT: 65 IN | HEART RATE: 89 BPM | OXYGEN SATURATION: 100 % | WEIGHT: 242 LBS

## 2022-12-23 DIAGNOSIS — L02.91 ABSCESS: Primary | ICD-10-CM

## 2022-12-23 PROCEDURE — 99284 PR EMERGENCY DEPT VISIT,LEVEL IV: ICD-10-PCS | Mod: ,,, | Performed by: EMERGENCY MEDICINE

## 2022-12-23 PROCEDURE — 99283 EMERGENCY DEPT VISIT LOW MDM: CPT

## 2022-12-23 PROCEDURE — 99284 EMERGENCY DEPT VISIT MOD MDM: CPT | Mod: ,,, | Performed by: EMERGENCY MEDICINE

## 2022-12-23 RX ORDER — CLINDAMYCIN HYDROCHLORIDE 300 MG/1
300 CAPSULE ORAL EVERY 6 HOURS
Qty: 40 CAPSULE | Refills: 0 | Status: SHIPPED | OUTPATIENT
Start: 2022-12-23 | End: 2023-01-02

## 2022-12-24 NOTE — DISCHARGE INSTRUCTIONS
TAKE CLINDAMYCIN AS DIRECTED.  DRINK PLENTY OF FLUIDS.  APPLY WARM COMPRESSES AS OFTEN AS POSSIBLE.  RETURN IF SYMPTOMS PERSIST OR WORSEN OR OTHERWISE AS NEEDED.

## 2022-12-24 NOTE — ED PROVIDER NOTES
Encounter Date: 2022       History     Chief Complaint   Patient presents with    Abscess     Left hip - pt states onset 2-3 days     46 Y/O MALE WITH LEFT LOWER ABDOMEN TENDER AREA THAT SHE FIRST NOTICED 2-3 DAYS AGO BUT HAS GOTTEN PROGRESSIVELY WORSE.  SHE SAYS PAIN IS SEVERE.  PAIN IS OWRSE WITH MOVEMENT AND WITH PALPATION.  SHE NOTES NO REMITTING OR EXACERBATING FACTORS.      Review of patient's allergies indicates:  No Known Allergies  Past Medical History:   Diagnosis Date    Carpal tunnel syndrome     Hyperlipidemia     Hypertension     Hypothyroidism     IBS (irritable bowel syndrome)      Past Surgical History:   Procedure Laterality Date    CARDIAC CATHETERIZATION      Dr. Beatty    CARPAL TUNNEL RELEASE Left     Dr. Byers     SECTION  1996    x 1    HYSTERECTOMY      LASER ABLATION Left 2019    Left GSV Laser Ablation performed by Dr. Juan Yang    LASER ABLATION Left 09/10/2021    Procedure: Left distal GSV Remnant Laser Ablation;  Surgeon: Ed Yang DO;  Location: [facility] OR;  Service: Vascular;  Laterality: Left;    Left Ilioinguinal NB Left     DR MCPHERSON    OOPHORECTOMY      TOTAL ABDOMINAL HYSTERECTOMY      Dr. Christine     Family History   Problem Relation Age of Onset    Hypertension Mother     No Known Problems Father     No Known Problems Sister         2 Living Ages 40 and 22    No Known Problems Brother         4 Living Ages 50, 46, unknown ages on other 2      Social History     Tobacco Use    Smoking status: Never    Smokeless tobacco: Never   Substance Use Topics    Alcohol use: Not Currently    Drug use: Never     Review of Systems   All other systems reviewed and are negative.    Physical Exam     Initial Vitals [22]   BP Pulse Resp Temp SpO2   (!) 149/74 89 18 98.2 °F (36.8 °C) 100 %      MAP       --         Physical Exam    Nursing note and vitals reviewed.  Constitutional: She appears well-developed and well-nourished.   HENT:   Head:  Normocephalic and atraumatic.   Eyes: Conjunctivae are normal. Pupils are equal, round, and reactive to light.   Neck: Neck supple.   Normal range of motion.  Cardiovascular:  Normal rate, regular rhythm and normal heart sounds.           Pulmonary/Chest: Breath sounds normal.   Abdominal: Abdomen is soft. Bowel sounds are normal.   Musculoskeletal:         General: Normal range of motion.      Cervical back: Normal range of motion and neck supple.     Neurological: She is alert and oriented to person, place, and time. She has normal strength. GCS score is 15. GCS eye subscore is 4. GCS verbal subscore is 5. GCS motor subscore is 6.   Skin:   LEFT LOWER ABDOMEN 3 X 3 AREA OF EXQUISITELY TENDER REDNESS AND INDURATION.  THE IS A PUNCTATE CENTRAL POINT OF DRAINAGE.         Medical Screening Exam   See Full Note    ED Course   Procedures  Labs Reviewed - No data to display       Imaging Results    None          Medications - No data to display                    Clinical Impression:   Final diagnoses:  [L02.91] Abscess (Primary)        ED Disposition Condition    Discharge Stable          ED Prescriptions       Medication Sig Dispense Start Date End Date Auth. Provider    clindamycin (CLEOCIN) 300 MG capsule Take 1 capsule (300 mg total) by mouth every 6 (six) hours. for 10 days 40 capsule 12/23/2022 1/2/2023 Rolando Garcia MD          Follow-up Information       Follow up With Specialties Details Why Contact Info    Alvina Shi DNP Family Medicine  As needed [address] [facility] [address]  [phone]               Rolando Garcia MD  12/23/22 9650

## 2023-01-13 DIAGNOSIS — I10 HYPERTENSION, UNSPECIFIED TYPE: ICD-10-CM

## 2023-01-13 RX ORDER — CLONIDINE HYDROCHLORIDE 0.2 MG/1
0.2 TABLET ORAL 2 TIMES DAILY
Qty: 180 TABLET | Refills: 0 | Status: SHIPPED | OUTPATIENT
Start: 2023-01-13 | End: 2023-03-07 | Stop reason: SDUPTHER

## 2023-01-13 NOTE — TELEPHONE ENCOUNTER
Pt requested refill on clonidine unable to come to appt due to son had surgery and no one to keept him

## 2023-01-13 NOTE — TELEPHONE ENCOUNTER
----- Message from Zekeiah Ormond sent at 1/13/2023 11:51 AM CST -----  Patient asked for a call back. Her call number is [phone].

## 2023-03-06 ENCOUNTER — OFFICE VISIT (OUTPATIENT)
Dept: FAMILY MEDICINE | Facility: CLINIC | Age: 48
End: 2023-03-06
Payer: MEDICAID

## 2023-03-06 VITALS
WEIGHT: 239 LBS | DIASTOLIC BLOOD PRESSURE: 79 MMHG | TEMPERATURE: 98 F | HEIGHT: 65 IN | HEART RATE: 74 BPM | RESPIRATION RATE: 18 BRPM | BODY MASS INDEX: 39.82 KG/M2 | SYSTOLIC BLOOD PRESSURE: 134 MMHG

## 2023-03-06 DIAGNOSIS — L02.211 ABDOMINAL WALL ABSCESS: Primary | ICD-10-CM

## 2023-03-06 PROCEDURE — 87077 CULTURE AEROBIC IDENTIFY: CPT | Mod: ,,, | Performed by: CLINICAL MEDICAL LABORATORY

## 2023-03-06 PROCEDURE — 87070 CULTURE, WOUND: ICD-10-PCS | Mod: ,,, | Performed by: CLINICAL MEDICAL LABORATORY

## 2023-03-06 PROCEDURE — 87077 CULTURE, WOUND: ICD-10-PCS | Mod: ,,, | Performed by: CLINICAL MEDICAL LABORATORY

## 2023-03-06 PROCEDURE — 99213 PR OFFICE/OUTPT VISIT, EST, LEVL III, 20-29 MIN: ICD-10-PCS | Mod: 25,,, | Performed by: NURSE PRACTITIONER

## 2023-03-06 PROCEDURE — 3008F PR BODY MASS INDEX (BMI) DOCUMENTED: ICD-10-PCS | Mod: ,,, | Performed by: NURSE PRACTITIONER

## 2023-03-06 PROCEDURE — 96372 PR INJECTION,THERAP/PROPH/DIAG2ST, IM OR SUBCUT: ICD-10-PCS | Mod: ,,, | Performed by: NURSE PRACTITIONER

## 2023-03-06 PROCEDURE — 87186 SC STD MICRODIL/AGAR DIL: CPT | Mod: ,,, | Performed by: CLINICAL MEDICAL LABORATORY

## 2023-03-06 PROCEDURE — 99213 OFFICE O/P EST LOW 20 MIN: CPT | Mod: 25,,, | Performed by: NURSE PRACTITIONER

## 2023-03-06 PROCEDURE — 3078F DIAST BP <80 MM HG: CPT | Mod: ,,, | Performed by: NURSE PRACTITIONER

## 2023-03-06 PROCEDURE — 3008F BODY MASS INDEX DOCD: CPT | Mod: ,,, | Performed by: NURSE PRACTITIONER

## 2023-03-06 PROCEDURE — 3078F PR MOST RECENT DIASTOLIC BLOOD PRESSURE < 80 MM HG: ICD-10-PCS | Mod: ,,, | Performed by: NURSE PRACTITIONER

## 2023-03-06 PROCEDURE — 90715 TDAP VACCINE 7 YRS/> IM: CPT | Mod: ,,, | Performed by: NURSE PRACTITIONER

## 2023-03-06 PROCEDURE — 87186 CULTURE, WOUND: ICD-10-PCS | Mod: ,,, | Performed by: CLINICAL MEDICAL LABORATORY

## 2023-03-06 PROCEDURE — 90715 TDAP VACCINE GREATER THAN OR EQUAL TO 7YO IM: ICD-10-PCS | Mod: ,,, | Performed by: NURSE PRACTITIONER

## 2023-03-06 PROCEDURE — 87070 CULTURE OTHR SPECIMN AEROBIC: CPT | Mod: ,,, | Performed by: CLINICAL MEDICAL LABORATORY

## 2023-03-06 PROCEDURE — 96372 THER/PROPH/DIAG INJ SC/IM: CPT | Mod: ,,, | Performed by: NURSE PRACTITIONER

## 2023-03-06 RX ORDER — CEFTRIAXONE 1 G/1
1 INJECTION, POWDER, FOR SOLUTION INTRAMUSCULAR; INTRAVENOUS
Status: COMPLETED | OUTPATIENT
Start: 2023-03-06 | End: 2023-03-06

## 2023-03-06 RX ORDER — CLINDAMYCIN HYDROCHLORIDE 300 MG/1
300 CAPSULE ORAL 3 TIMES DAILY
Qty: 30 CAPSULE | Refills: 0 | Status: SHIPPED | OUTPATIENT
Start: 2023-03-06 | End: 2023-05-23 | Stop reason: SDUPTHER

## 2023-03-06 RX ADMIN — CEFTRIAXONE 1 G: 1 INJECTION, POWDER, FOR SOLUTION INTRAMUSCULAR; INTRAVENOUS at 11:03

## 2023-03-06 NOTE — PROGRESS NOTES
Alvina Shi DNP   [address] [place] [address]     PATIENT NAME: Maury Mckenzie  : 1975  DATE: 3/6/23  MRN: [id number]      Billing Provider: Alvina Shi DNP  Level of Service:   Patient PCP Information       Provider PCP Type    Alvina Shi DNP General            Reason for Visit / Chief Complaint: Abscess (Boils on her stomach and thigh)       Update PCP  Update Chief Complaint         History of Present Illness / Problem Focused Workflow     Maury Mckenzie presents to the clinic with Abscess (Boils on her stomach and thigh)     Abscess    Review of Systems     Review of Systems     Medical / Social / Family History     Past Medical History:   Diagnosis Date    Carpal tunnel syndrome     Hyperlipidemia     Hypertension     Hypothyroidism     IBS (irritable bowel syndrome)        Past Surgical History:   Procedure Laterality Date    CARDIAC CATHETERIZATION      Dr. Beatty    CARPAL TUNNEL RELEASE Left     Dr. Byers     SECTION  1996    x 1    HYSTERECTOMY      LASER ABLATION Left 2019    Left GSV Laser Ablation performed by Dr. Juan Yang    LASER ABLATION Left 09/10/2021    Procedure: Left distal GSV Remnant Laser Ablation;  Surgeon: Ed Yang DO;  Location: [facility];  Service: Vascular;  Laterality: Left;    Left Ilioinguinal NB Left     DR MCPHERSON    OOPHORECTOMY      TOTAL ABDOMINAL HYSTERECTOMY      Dr. Christine       Social History  Ms.  reports that she has never smoked. She has never used smokeless tobacco. She reports that she does not currently use alcohol. She reports that she does not use drugs.    Family History  Ms.'s family history includes Hypertension in her mother; No Known Problems in her brother, father, and sister.    Medications and Allergies     Medications  No outpatient medications have been marked as taking for the 3/6/23 encounter (Office Visit) with Alvina Shi DNP.     Current  "Facility-Administered Medications for the 3/6/23 encounter (Office Visit) with Alvina Shi DNP   Medication Dose Route Frequency Provider Last Rate Last Admin    [COMPLETED] cefTRIAXone injection 1 g  1 g Intramuscular 1 time in Clinic/HOD Alvina Shi DNP   1 g at 03/06/23 1111       Allergies  Review of patient's allergies indicates:  No Known Allergies    Physical Examination   /79 (BP Location: Left arm, Patient Position: Sitting, BP Method: Large (Automatic))   Pulse 74   Temp 97.8 °F (36.6 °C) (Oral)   Resp 18   Ht 5' 5" (1.651 m)   Wt 108.4 kg (239 lb)   BMI 39.77 kg/m²    Physical Exam  Vitals and nursing note reviewed.   Constitutional:       Appearance: Normal appearance. She is obese.   HENT:      Head: Normocephalic.      Nose: Nose normal.      Mouth/Throat:      Mouth: Mucous membranes are moist.   Eyes:      Extraocular Movements: Extraocular movements intact.      Conjunctiva/sclera: Conjunctivae normal.      Pupils: Pupils are equal, round, and reactive to light.   Cardiovascular:      Rate and Rhythm: Normal rate and regular rhythm.      Pulses: Normal pulses.      Heart sounds: Normal heart sounds.   Pulmonary:      Effort: Pulmonary effort is normal.      Breath sounds: Normal breath sounds.   Abdominal:      General: Abdomen is flat. Bowel sounds are normal.      Palpations: Abdomen is soft.      Tenderness: There is abdominal tenderness.   Musculoskeletal:         General: Normal range of motion.      Cervical back: Normal range of motion and neck supple.      Comments: Varicose LLE    Skin:     General: Skin is warm and dry.      Capillary Refill: Capillary refill takes less than 2 seconds.      Findings: Lesion and rash present.      Comments: Large grapefruit sized cellulitis rt rt lower abd. With tenderness,. Open lesion dime sized with serosanguinous fluid. Cleaned with soap and water.  Culture obtained.   Applied mupirocin and abd pad taped,     Neurological:      " General: No focal deficit present.      Mental Status: She is alert and oriented to person, place, and time. Mental status is at baseline.   Psychiatric:         Mood and Affect: Mood normal.         Behavior: Behavior normal.         Thought Content: Thought content normal.         Judgment: Judgment normal.        Assessment and Plan (including Health Maintenance)      Problem List  Smart Sets  Document Outside HM   :    Plan:     Keep clean. Avoid picking.   See back 1-2 days recheck    Health Maintenance Due   Topic Date Due    COVID-19 Vaccine (1) Never done       Problem List Items Addressed This Visit          ID    Abdominal wall abscess - Primary    Relevant Medications    cefTRIAXone injection 1 g (Completed)    clindamycin (CLEOCIN) 300 MG capsule    Other Relevant Orders    Tdap Vaccine (Completed)    Culture, Wound       Endocrine    BMI 38.0-38.9,adult       Health Maintenance Topics with due status: Not Due       Topic Last Completion Date    Hemoglobin A1c (Diabetic Prevention Screening) 12/06/2021    Lipid Panel 03/07/2022    Mammogram 09/14/2022    TETANUS VACCINE 03/06/2023       Future Appointments   Date Time Provider Department Center   3/7/2023  8:15 AM Alvina Shi DNP [facility]   9/14/2023  9:30 AM Ed Yang DO [facility]   9/20/2023  9:15 AM [facility]            Signature:  Alvina Shi DNP      [address] [place] [address]    Date of encounter: 3/6/23

## 2023-03-07 ENCOUNTER — OFFICE VISIT (OUTPATIENT)
Dept: FAMILY MEDICINE | Facility: CLINIC | Age: 48
End: 2023-03-07
Payer: MEDICAID

## 2023-03-07 VITALS
DIASTOLIC BLOOD PRESSURE: 75 MMHG | HEART RATE: 80 BPM | WEIGHT: 240.19 LBS | HEIGHT: 65 IN | BODY MASS INDEX: 40.02 KG/M2 | SYSTOLIC BLOOD PRESSURE: 120 MMHG | TEMPERATURE: 98 F

## 2023-03-07 DIAGNOSIS — Z79.899 ON LONG TERM DRUG THERAPY: ICD-10-CM

## 2023-03-07 DIAGNOSIS — M19.90 ARTHRITIS: ICD-10-CM

## 2023-03-07 DIAGNOSIS — E78.5 HYPERLIPIDEMIA, UNSPECIFIED HYPERLIPIDEMIA TYPE: ICD-10-CM

## 2023-03-07 DIAGNOSIS — L02.211 ABDOMINAL WALL ABSCESS: ICD-10-CM

## 2023-03-07 DIAGNOSIS — I10 HYPERTENSION, UNSPECIFIED TYPE: Primary | ICD-10-CM

## 2023-03-07 DIAGNOSIS — R73.9 HYPERGLYCEMIA: ICD-10-CM

## 2023-03-07 DIAGNOSIS — E03.9 HYPOTHYROIDISM, UNSPECIFIED TYPE: ICD-10-CM

## 2023-03-07 DIAGNOSIS — I87.2 VENOUS INSUFFICIENCY: ICD-10-CM

## 2023-03-07 DIAGNOSIS — Z12.11 SCREENING FOR COLON CANCER: ICD-10-CM

## 2023-03-07 LAB
25(OH)D3 SERPL-MCNC: 37.6 NG/ML
ALBUMIN SERPL BCP-MCNC: 3.4 G/DL (ref 3.5–5)
ALBUMIN/GLOB SERPL: 1 {RATIO}
ALP SERPL-CCNC: 46 U/L (ref 39–100)
ALT SERPL W P-5'-P-CCNC: 25 U/L (ref 13–56)
ANION GAP SERPL CALCULATED.3IONS-SCNC: 9 MMOL/L (ref 7–16)
AST SERPL W P-5'-P-CCNC: 29 U/L (ref 15–37)
BASOPHILS # BLD AUTO: 0.05 K/UL (ref 0–0.2)
BASOPHILS NFR BLD AUTO: 1 % (ref 0–1)
BILIRUB SERPL-MCNC: 0.3 MG/DL (ref ?–1.2)
BUN SERPL-MCNC: 14 MG/DL (ref 7–18)
BUN/CREAT SERPL: 19 (ref 6–20)
CALCIUM SERPL-MCNC: 8.9 MG/DL (ref 8.5–10.1)
CHLORIDE SERPL-SCNC: 111 MMOL/L (ref 98–107)
CHOLEST SERPL-MCNC: 157 MG/DL (ref 0–200)
CHOLEST/HDLC SERPL: 2.8 {RATIO}
CO2 SERPL-SCNC: 26 MMOL/L (ref 21–32)
CREAT SERPL-MCNC: 0.75 MG/DL (ref 0.55–1.02)
CREAT UR-MCNC: 266 MG/DL (ref 28–219)
DIFFERENTIAL METHOD BLD: ABNORMAL
EGFR (NO RACE VARIABLE) (RUSH/TITUS): 98 ML/MIN/1.73M²
EOSINOPHIL # BLD AUTO: 0.25 K/UL (ref 0–0.5)
EOSINOPHIL NFR BLD AUTO: 4.8 % (ref 1–4)
ERYTHROCYTE [DISTWIDTH] IN BLOOD BY AUTOMATED COUNT: 14.7 % (ref 11.5–14.5)
EST. AVERAGE GLUCOSE BLD GHB EST-MCNC: 100 MG/DL
FOLATE SERPL-MCNC: 16 NG/ML (ref 3.1–17.5)
GLOBULIN SER-MCNC: 3.3 G/DL (ref 2–4)
GLUCOSE SERPL-MCNC: 108 MG/DL (ref 74–106)
HBA1C MFR BLD HPLC: 5.6 % (ref 4.5–6.6)
HCT VFR BLD AUTO: 39.1 % (ref 38–47)
HDLC SERPL-MCNC: 56 MG/DL (ref 40–60)
HGB BLD-MCNC: 12 G/DL (ref 12–16)
IMM GRANULOCYTES # BLD AUTO: 0.01 K/UL (ref 0–0.04)
IMM GRANULOCYTES NFR BLD: 0.2 % (ref 0–0.4)
LDLC SERPL CALC-MCNC: 87 MG/DL
LDLC/HDLC SERPL: 1.6 {RATIO}
LYMPHOCYTES # BLD AUTO: 1.65 K/UL (ref 1–4.8)
LYMPHOCYTES NFR BLD AUTO: 31.9 % (ref 27–41)
MCH RBC QN AUTO: 28.6 PG (ref 27–31)
MCHC RBC AUTO-ENTMCNC: 30.7 G/DL (ref 32–36)
MCV RBC AUTO: 93.1 FL (ref 80–96)
MICROALBUMIN UR-MCNC: 1.6 MG/DL (ref 0–2.8)
MICROALBUMIN/CREAT RATIO PNL UR: 6 MG/G (ref 0–30)
MONOCYTES # BLD AUTO: 0.43 K/UL (ref 0–0.8)
MONOCYTES NFR BLD AUTO: 8.3 % (ref 2–6)
MPC BLD CALC-MCNC: 10.7 FL (ref 9.4–12.4)
NEUTROPHILS # BLD AUTO: 2.79 K/UL (ref 1.8–7.7)
NEUTROPHILS NFR BLD AUTO: 53.8 % (ref 53–65)
NONHDLC SERPL-MCNC: 101 MG/DL
NRBC # BLD AUTO: 0 X10E3/UL
NRBC, AUTO (.00): 0 %
PLATELET # BLD AUTO: 219 K/UL (ref 150–400)
POTASSIUM SERPL-SCNC: 4.6 MMOL/L (ref 3.5–5.1)
PROT SERPL-MCNC: 6.7 G/DL (ref 6.4–8.2)
RBC # BLD AUTO: 4.2 M/UL (ref 4.2–5.4)
SODIUM SERPL-SCNC: 141 MMOL/L (ref 136–145)
T4 FREE SERPL-MCNC: 0.95 NG/DL (ref 0.76–1.46)
TRIGL SERPL-MCNC: 68 MG/DL (ref 35–150)
TSH SERPL DL<=0.005 MIU/L-ACNC: 3.3 UIU/ML (ref 0.36–3.74)
VIT B12 SERPL-MCNC: 612 PG/ML (ref 193–986)
VLDLC SERPL-MCNC: 14 MG/DL
WBC # BLD AUTO: 5.18 K/UL (ref 4.5–11)

## 2023-03-07 PROCEDURE — 85025 COMPLETE CBC W/AUTO DIFF WBC: CPT | Mod: ,,, | Performed by: CLINICAL MEDICAL LABORATORY

## 2023-03-07 PROCEDURE — 82570 ASSAY OF URINE CREATININE: CPT | Mod: ,,, | Performed by: CLINICAL MEDICAL LABORATORY

## 2023-03-07 PROCEDURE — 82746 VITAMIN B12/FOLATE, SERUM PANEL: ICD-10-PCS | Mod: ,,, | Performed by: CLINICAL MEDICAL LABORATORY

## 2023-03-07 PROCEDURE — 84439 THYROID PANEL: ICD-10-PCS | Mod: ,,, | Performed by: CLINICAL MEDICAL LABORATORY

## 2023-03-07 PROCEDURE — 82043 UR ALBUMIN QUANTITATIVE: CPT | Mod: ,,, | Performed by: CLINICAL MEDICAL LABORATORY

## 2023-03-07 PROCEDURE — 99214 OFFICE O/P EST MOD 30 MIN: CPT | Mod: ,,, | Performed by: NURSE PRACTITIONER

## 2023-03-07 PROCEDURE — 3008F PR BODY MASS INDEX (BMI) DOCUMENTED: ICD-10-PCS | Mod: ,,, | Performed by: NURSE PRACTITIONER

## 2023-03-07 PROCEDURE — 82746 ASSAY OF FOLIC ACID SERUM: CPT | Mod: ,,, | Performed by: CLINICAL MEDICAL LABORATORY

## 2023-03-07 PROCEDURE — 80061 LIPID PANEL: ICD-10-PCS | Mod: ,,, | Performed by: CLINICAL MEDICAL LABORATORY

## 2023-03-07 PROCEDURE — 82570 MICROALBUMIN / CREATININE RATIO URINE: ICD-10-PCS | Mod: ,,, | Performed by: CLINICAL MEDICAL LABORATORY

## 2023-03-07 PROCEDURE — 83036 HEMOGLOBIN A1C: ICD-10-PCS | Mod: ,,, | Performed by: CLINICAL MEDICAL LABORATORY

## 2023-03-07 PROCEDURE — 84439 ASSAY OF FREE THYROXINE: CPT | Mod: ,,, | Performed by: CLINICAL MEDICAL LABORATORY

## 2023-03-07 PROCEDURE — 99214 PR OFFICE/OUTPT VISIT, EST, LEVL IV, 30-39 MIN: ICD-10-PCS | Mod: ,,, | Performed by: NURSE PRACTITIONER

## 2023-03-07 PROCEDURE — 82043 MICROALBUMIN / CREATININE RATIO URINE: ICD-10-PCS | Mod: ,,, | Performed by: CLINICAL MEDICAL LABORATORY

## 2023-03-07 PROCEDURE — 82306 VITAMIN D 25 HYDROXY: CPT | Mod: ,,, | Performed by: CLINICAL MEDICAL LABORATORY

## 2023-03-07 PROCEDURE — 83036 HEMOGLOBIN GLYCOSYLATED A1C: CPT | Mod: ,,, | Performed by: CLINICAL MEDICAL LABORATORY

## 2023-03-07 PROCEDURE — 3074F SYST BP LT 130 MM HG: CPT | Mod: ,,, | Performed by: NURSE PRACTITIONER

## 2023-03-07 PROCEDURE — 85025 CBC WITH DIFFERENTIAL: ICD-10-PCS | Mod: ,,, | Performed by: CLINICAL MEDICAL LABORATORY

## 2023-03-07 PROCEDURE — 3008F BODY MASS INDEX DOCD: CPT | Mod: ,,, | Performed by: NURSE PRACTITIONER

## 2023-03-07 PROCEDURE — 3078F DIAST BP <80 MM HG: CPT | Mod: ,,, | Performed by: NURSE PRACTITIONER

## 2023-03-07 PROCEDURE — 82306 VITAMIN D: ICD-10-PCS | Mod: ,,, | Performed by: CLINICAL MEDICAL LABORATORY

## 2023-03-07 PROCEDURE — 84443 ASSAY THYROID STIM HORMONE: CPT | Mod: ,,, | Performed by: CLINICAL MEDICAL LABORATORY

## 2023-03-07 PROCEDURE — 3074F PR MOST RECENT SYSTOLIC BLOOD PRESSURE < 130 MM HG: ICD-10-PCS | Mod: ,,, | Performed by: NURSE PRACTITIONER

## 2023-03-07 PROCEDURE — 80053 COMPREHEN METABOLIC PANEL: CPT | Mod: ,,, | Performed by: CLINICAL MEDICAL LABORATORY

## 2023-03-07 PROCEDURE — 82607 VITAMIN B12/FOLATE, SERUM PANEL: ICD-10-PCS | Mod: ,,, | Performed by: CLINICAL MEDICAL LABORATORY

## 2023-03-07 PROCEDURE — 80053 COMPREHENSIVE METABOLIC PANEL: ICD-10-PCS | Mod: ,,, | Performed by: CLINICAL MEDICAL LABORATORY

## 2023-03-07 PROCEDURE — 82607 VITAMIN B-12: CPT | Mod: ,,, | Performed by: CLINICAL MEDICAL LABORATORY

## 2023-03-07 PROCEDURE — 3078F PR MOST RECENT DIASTOLIC BLOOD PRESSURE < 80 MM HG: ICD-10-PCS | Mod: ,,, | Performed by: NURSE PRACTITIONER

## 2023-03-07 PROCEDURE — 84443 THYROID PANEL: ICD-10-PCS | Mod: ,,, | Performed by: CLINICAL MEDICAL LABORATORY

## 2023-03-07 PROCEDURE — 80061 LIPID PANEL: CPT | Mod: ,,, | Performed by: CLINICAL MEDICAL LABORATORY

## 2023-03-07 RX ORDER — CELECOXIB 200 MG/1
200 CAPSULE ORAL DAILY
Qty: 90 CAPSULE | Refills: 1 | Status: SHIPPED | OUTPATIENT
Start: 2023-03-07 | End: 2024-02-05

## 2023-03-07 RX ORDER — LEVOTHYROXINE SODIUM 25 UG/1
25 TABLET ORAL
Qty: 90 TABLET | Refills: 1 | Status: SHIPPED | OUTPATIENT
Start: 2023-03-07 | End: 2023-09-11 | Stop reason: SDUPTHER

## 2023-03-07 RX ORDER — FUROSEMIDE 20 MG/1
20 TABLET ORAL DAILY
Qty: 90 TABLET | Refills: 1 | Status: SHIPPED | OUTPATIENT
Start: 2023-03-07 | End: 2023-09-11 | Stop reason: SDUPTHER

## 2023-03-07 RX ORDER — CLONIDINE HYDROCHLORIDE 0.2 MG/1
0.2 TABLET ORAL 2 TIMES DAILY
Qty: 180 TABLET | Refills: 0 | Status: SHIPPED | OUTPATIENT
Start: 2023-03-07 | End: 2023-09-11 | Stop reason: SDUPTHER

## 2023-03-07 RX ORDER — ERGOCALCIFEROL 1.25 MG/1
50000 CAPSULE ORAL
Qty: 12 CAPSULE | Refills: 1 | Status: SHIPPED | OUTPATIENT
Start: 2023-03-07

## 2023-03-07 RX ORDER — PRAVASTATIN SODIUM 40 MG/1
40 TABLET ORAL NIGHTLY
Qty: 90 TABLET | Refills: 1 | Status: SHIPPED | OUTPATIENT
Start: 2023-03-07 | End: 2023-09-11 | Stop reason: SDUPTHER

## 2023-03-07 RX ORDER — TIZANIDINE 4 MG/1
4 TABLET ORAL NIGHTLY PRN
Qty: 60 TABLET | Refills: 1 | Status: SHIPPED | OUTPATIENT
Start: 2023-03-07

## 2023-03-07 RX ORDER — OLMESARTAN MEDOXOMIL 5 MG/1
5 TABLET ORAL DAILY
Qty: 90 TABLET | Refills: 1 | Status: SHIPPED | OUTPATIENT
Start: 2023-03-07 | End: 2023-09-11 | Stop reason: SDUPTHER

## 2023-03-07 NOTE — PROGRESS NOTES
Alvina Shi DNP   [address] [place] [address]     PATIENT NAME: Maury Mckenzie  : 1975  DATE: 3/7/23  MRN: [id number]      Billing Provider: Alvina Shi DNP  Level of Service:   Patient PCP Information       Provider PCP Type    Alvina Shi DNP General            Reason for Visit / Chief Complaint: follow up appt       Update PCP  Update Chief Complaint         History of Present Illness / Problem Focused Workflow     Maury Mckenzie presents to the clinic with No chief complaint on file.     HPI    Review of Systems     Review of Systems     Medical / Social / Family History     Past Medical History:   Diagnosis Date    Carpal tunnel syndrome     Hyperlipidemia     Hypertension     Hypothyroidism     IBS (irritable bowel syndrome)        Past Surgical History:   Procedure Laterality Date    CARDIAC CATHETERIZATION      Dr. Beatty    CARPAL TUNNEL RELEASE Left     Dr. Byers     SECTION  1996    x 1    HYSTERECTOMY      LASER ABLATION Left 2019    Left GSV Laser Ablation performed by Dr. Juan Yang    LASER ABLATION Left 09/10/2021    Procedure: Left distal GSV Remnant Laser Ablation;  Surgeon: Ed Yang DO;  Location: [facility];  Service: Vascular;  Laterality: Left;    Left Ilioinguinal NB Left     DR MCPHERSON    OOPHORECTOMY      TOTAL ABDOMINAL HYSTERECTOMY      Dr. Christine       Social History  Ms.  reports that she has never smoked. She has never used smokeless tobacco. She reports that she does not currently use alcohol. She reports that she does not use drugs.    Family History  Ms.'s family history includes Hypertension in her mother; No Known Problems in her brother, father, and sister.    Medications and Allergies     Medications  No outpatient medications have been marked as taking for the 3/7/23 encounter (Appointment) with Alvina Shi DNP.     Current Facility-Administered Medications for the 3/7/23  encounter (Appointment) with Alvina Shi DNP   Medication Dose Route Frequency Provider Last Rate Last Admin    [COMPLETED] cefTRIAXone injection 1 g  1 g Intramuscular 1 time in Clinic/HOD Alvina Shi DNP   1 g at 03/06/23 1111       Allergies  Review of patient's allergies indicates:  No Known Allergies    Physical Examination   There were no vitals taken for this visit.   Physical Exam  Vitals and nursing note reviewed.   Constitutional:       Appearance: Normal appearance. She is obese.   HENT:      Head: Normocephalic.      Nose: Nose normal.      Mouth/Throat:      Mouth: Mucous membranes are moist.   Eyes:      Extraocular Movements: Extraocular movements intact.      Conjunctiva/sclera: Conjunctivae normal.      Pupils: Pupils are equal, round, and reactive to light.   Cardiovascular:      Rate and Rhythm: Normal rate and regular rhythm.      Pulses: Normal pulses.      Heart sounds: Normal heart sounds.   Pulmonary:      Effort: Pulmonary effort is normal.      Breath sounds: Normal breath sounds.   Abdominal:      General: Abdomen is flat. Bowel sounds are normal.      Palpations: Abdomen is soft.      Tenderness: There is abdominal tenderness.   Musculoskeletal:         General: Normal range of motion.      Cervical back: Normal range of motion and neck supple.      Comments: Varicose LLE    Skin:     General: Skin is warm and dry.      Capillary Refill: Capillary refill takes less than 2 seconds.      Findings: Lesion and rash present.      Comments: Large grapefruit sized cellulitis rt rt lower abd. With tenderness,. Open lesion dime sized with serosanguinous fluid. Cleaned with soap and water.  Culture obtained.   Applied mupirocin and abd pad taped,     Neurological:      General: No focal deficit present.      Mental Status: She is alert and oriented to person, place, and time. Mental status is at baseline.   Psychiatric:         Mood and Affect: Mood normal.         Behavior: Behavior  normal.         Thought Content: Thought content normal.         Judgment: Judgment normal.        Assessment and Plan (including Health Maintenance)      Problem List  Smart Sets  Document Outside HM   :    Plan:     here for fu appt.        mmg 9/2022  Had pap 4/2021   declined flu and covid vaccines  optho - >2 years,. Number given to call  - [facility] [place]     Colonoscopy - no fam hx ordered now since guidelines changed - reordered for c'scopy states had one years ago.   Tdap 3/2023  use mobic sparingly seen PM in the past.      Nonsmoker. Takes asa 81mg daily.    card - 10/2021 saluhuddin - yearly.   [facility] 10/2021  Keep f/u with Trey. saw [facility]burn 1/2021 q6 months has procedure on 9/9/2021 has appt on 9/16/2022     Keep f/u with Nael for CTS surgery. Left first, then right. last seen 9/2020       Health Maintenance Due   Topic Date Due    COVID-19 Vaccine (1) Never done       Problem List Items Addressed This Visit          Cardiac/Vascular    Venous insufficiency    Hypertension    Relevant Orders    CBC Auto Differential    Comprehensive Metabolic Panel    Microalbumin/Creatinine Ratio, Urine    Hyperlipidemia    Relevant Orders    Lipid Panel       Endocrine    Hypothyroidism - Primary    Hyperglycemia    Relevant Orders    Hemoglobin A1C     Other Visit Diagnoses       On long term drug therapy        Relevant Orders    Vitamin D    Vitamin B12 & Folate    Thyroid Panel            Health Maintenance Topics with due status: Not Due       Topic Last Completion Date    Hemoglobin A1c (Diabetic Prevention Screening) 12/06/2021    Lipid Panel 03/07/2022    Mammogram 09/14/2022    TETANUS VACCINE 03/06/2023       Future Appointments   Date Time Provider Department Center   9/14/2023  9:30 AM Ed Yang, DO [facility]   9/20/2023  9:15 AM [facility]            Signature:  Alvina Shi, ZE      [address] [place] [address]    Date  of encounter: 3/7/23

## 2023-03-08 LAB — MICROORGANISM SPEC CULT: ABNORMAL

## 2023-04-04 DIAGNOSIS — Z12.11 SCREENING FOR COLON CANCER: Primary | ICD-10-CM

## 2023-04-20 ENCOUNTER — OFFICE VISIT (OUTPATIENT)
Dept: FAMILY MEDICINE | Facility: CLINIC | Age: 48
End: 2023-04-20
Payer: MEDICAID

## 2023-04-20 VITALS
RESPIRATION RATE: 18 BRPM | TEMPERATURE: 98 F | DIASTOLIC BLOOD PRESSURE: 85 MMHG | BODY MASS INDEX: 40.48 KG/M2 | OXYGEN SATURATION: 99 % | HEIGHT: 65 IN | WEIGHT: 243 LBS | SYSTOLIC BLOOD PRESSURE: 138 MMHG | HEART RATE: 70 BPM

## 2023-04-20 DIAGNOSIS — R05.9 COUGH, UNSPECIFIED TYPE: ICD-10-CM

## 2023-04-20 DIAGNOSIS — J32.9 SINUSITIS, UNSPECIFIED CHRONICITY, UNSPECIFIED LOCATION: Primary | ICD-10-CM

## 2023-04-20 LAB
CTP QC/QA: YES
CTP QC/QA: YES
FLUAV AG NPH QL: NEGATIVE
FLUBV AG NPH QL: NEGATIVE
S PYO RRNA THROAT QL PROBE: NEGATIVE

## 2023-04-20 PROCEDURE — 3008F BODY MASS INDEX DOCD: CPT | Mod: ,,, | Performed by: NURSE PRACTITIONER

## 2023-04-20 PROCEDURE — 99213 OFFICE O/P EST LOW 20 MIN: CPT | Mod: 25,,, | Performed by: NURSE PRACTITIONER

## 2023-04-20 PROCEDURE — 96372 PR INJECTION,THERAP/PROPH/DIAG2ST, IM OR SUBCUT: ICD-10-PCS | Mod: ,,, | Performed by: NURSE PRACTITIONER

## 2023-04-20 PROCEDURE — 99213 PR OFFICE/OUTPT VISIT, EST, LEVL III, 20-29 MIN: ICD-10-PCS | Mod: 25,,, | Performed by: NURSE PRACTITIONER

## 2023-04-20 PROCEDURE — 87804 INFLUENZA ASSAY W/OPTIC: CPT | Mod: 59,QW,, | Performed by: NURSE PRACTITIONER

## 2023-04-20 PROCEDURE — 96372 THER/PROPH/DIAG INJ SC/IM: CPT | Mod: ,,, | Performed by: NURSE PRACTITIONER

## 2023-04-20 PROCEDURE — 87880 POCT RAPID STREP A: ICD-10-PCS | Mod: QW,,, | Performed by: NURSE PRACTITIONER

## 2023-04-20 PROCEDURE — 3008F PR BODY MASS INDEX (BMI) DOCUMENTED: ICD-10-PCS | Mod: ,,, | Performed by: NURSE PRACTITIONER

## 2023-04-20 PROCEDURE — 87804 POCT INFLUENZA A/B: ICD-10-PCS | Mod: 59,QW,, | Performed by: NURSE PRACTITIONER

## 2023-04-20 PROCEDURE — 87880 STREP A ASSAY W/OPTIC: CPT | Mod: QW,,, | Performed by: NURSE PRACTITIONER

## 2023-04-20 RX ORDER — IBUPROFEN 800 MG/1
800 TABLET ORAL 3 TIMES DAILY
COMMUNITY
End: 2023-04-20 | Stop reason: SDUPTHER

## 2023-04-20 RX ORDER — METHYLPREDNISOLONE ACETATE 80 MG/ML
40 INJECTION, SUSPENSION INTRA-ARTICULAR; INTRALESIONAL; INTRAMUSCULAR; SOFT TISSUE
Status: COMPLETED | OUTPATIENT
Start: 2023-04-20 | End: 2023-04-20

## 2023-04-20 RX ORDER — IBUPROFEN 800 MG/1
800 TABLET ORAL 3 TIMES DAILY
Qty: 30 TABLET | Refills: 0 | Status: SHIPPED | OUTPATIENT
Start: 2023-04-20 | End: 2023-11-15 | Stop reason: CLARIF

## 2023-04-20 RX ORDER — CEFDINIR 300 MG/1
300 CAPSULE ORAL 2 TIMES DAILY
Qty: 14 CAPSULE | Refills: 0 | Status: SHIPPED | OUTPATIENT
Start: 2023-04-20 | End: 2023-04-27

## 2023-04-20 RX ORDER — CEFTRIAXONE 1 G/1
1 INJECTION, POWDER, FOR SOLUTION INTRAMUSCULAR; INTRAVENOUS
Status: COMPLETED | OUTPATIENT
Start: 2023-04-20 | End: 2023-04-20

## 2023-04-20 RX ORDER — METHYLPREDNISOLONE 4 MG/1
TABLET ORAL
Qty: 21 TABLET | Refills: 0 | Status: SHIPPED | OUTPATIENT
Start: 2023-04-20 | End: 2023-11-15

## 2023-04-20 RX ORDER — DEXAMETHASONE SODIUM PHOSPHATE 4 MG/ML
4 INJECTION, SOLUTION INTRA-ARTICULAR; INTRALESIONAL; INTRAMUSCULAR; INTRAVENOUS; SOFT TISSUE
Status: COMPLETED | OUTPATIENT
Start: 2023-04-20 | End: 2023-04-20

## 2023-04-20 RX ADMIN — CEFTRIAXONE 1 G: 1 INJECTION, POWDER, FOR SOLUTION INTRAMUSCULAR; INTRAVENOUS at 09:04

## 2023-04-20 RX ADMIN — DEXAMETHASONE SODIUM PHOSPHATE 4 MG: 4 INJECTION, SOLUTION INTRA-ARTICULAR; INTRALESIONAL; INTRAMUSCULAR; INTRAVENOUS; SOFT TISSUE at 09:04

## 2023-04-20 RX ADMIN — METHYLPREDNISOLONE ACETATE 40 MG: 80 INJECTION, SUSPENSION INTRA-ARTICULAR; INTRALESIONAL; INTRAMUSCULAR; SOFT TISSUE at 09:04

## 2023-04-20 NOTE — PROGRESS NOTES
Alvina Shi DNP   [address] [place] [address]     PATIENT NAME: Maury Mckenzie  : 1975  DATE: 23  MRN: [id number]      Billing Provider: Alvina Shi DNP  Level of Service:   Patient PCP Information       Provider PCP Type    Alvina Shi DNP General            Reason for Visit / Chief Complaint: Sinus Problem, Otalgia, and Sore Throat       Update PCP  Update Chief Complaint         History of Present Illness / Problem Focused Workflow     Mauyr Mckenzie presents to the clinic with Sinus Problem, Otalgia, and Sore Throat     Sinus Problem  Associated symptoms include ear pain and a sore throat.   Otalgia   Associated symptoms include a sore throat.   Sore Throat   Associated symptoms include ear pain.     Review of Systems     Review of Systems   HENT:  Positive for ear pain and sore throat.       Medical / Social / Family History     Past Medical History:   Diagnosis Date    Carpal tunnel syndrome     Hyperlipidemia     Hypertension     Hypothyroidism     IBS (irritable bowel syndrome)        Past Surgical History:   Procedure Laterality Date    CARDIAC CATHETERIZATION      Dr. Beatty    CARPAL TUNNEL RELEASE Left     Dr. Byers     SECTION  1996    x 1    HYSTERECTOMY      LASER ABLATION Left 2019    Left GSV Laser Ablation performed by Dr. Juan Yang    LASER ABLATION Left 09/10/2021    Procedure: Left distal GSV Remnant Laser Ablation;  Surgeon: Ed Yang DO;  Location: [facility];  Service: Vascular;  Laterality: Left;    Left Ilioinguinal NB Left     DR MCPHERSON    OOPHORECTOMY      TOTAL ABDOMINAL HYSTERECTOMY      Dr. Christine       Social History  Ms.  reports that she has never smoked. She has never used smokeless tobacco. She reports that she does not currently use alcohol. She reports that she does not use drugs.    Family History  Ms.'s family history includes Hypertension in her mother; No Known  "Problems in her brother, father, and sister.    Medications and Allergies     Medications  No outpatient medications have been marked as taking for the 4/20/23 encounter (Office Visit) with Alvina Shi DNP.     Current Facility-Administered Medications for the 4/20/23 encounter (Office Visit) with Alvina Shi DNP   Medication Dose Route Frequency Provider Last Rate Last Admin    [COMPLETED] cefTRIAXone injection 1 g  1 g Intramuscular 1 time in Clinic/HOD Alvina Shi DNP   1 g at 04/20/23 0957    [COMPLETED] dexAMETHasone injection 4 mg  4 mg Intramuscular 1 time in Clinic/HOD Alvina Shi DNP   4 mg at 04/20/23 0958    [COMPLETED] methylPREDNISolone acetate injection 40 mg  40 mg Intramuscular 1 time in Clinic/HOD Alvina Shi DNP   40 mg at 04/20/23 0958       Allergies  Review of patient's allergies indicates:  No Known Allergies    Physical Examination   /85 (BP Location: Left arm, Patient Position: Sitting, BP Method: Large (Automatic))   Pulse 70   Temp 98.2 °F (36.8 °C) (Oral)   Resp 18   Ht 5' 5" (1.651 m)   Wt 110.2 kg (243 lb)   SpO2 99%   BMI 40.44 kg/m²    Physical Exam  Vitals and nursing note reviewed.   Constitutional:       Appearance: Normal appearance. She is obese. She is ill-appearing.   HENT:      Head: Normocephalic.      Ears:      Comments: Dull tm with fluid and bubbles noted.     Nose: Congestion and rhinorrhea present.      Mouth/Throat:      Mouth: Mucous membranes are moist.      Pharynx: Posterior oropharyngeal erythema present.   Eyes:      Extraocular Movements: Extraocular movements intact.      Conjunctiva/sclera: Conjunctivae normal.      Pupils: Pupils are equal, round, and reactive to light.   Cardiovascular:      Rate and Rhythm: Normal rate and regular rhythm.      Pulses: Normal pulses.      Heart sounds: Normal heart sounds.   Pulmonary:      Effort: Pulmonary effort is normal.      Breath sounds: Normal breath sounds.   Abdominal: "      General: Abdomen is flat. Bowel sounds are normal.      Palpations: Abdomen is soft.   Musculoskeletal:         General: Normal range of motion.      Cervical back: Normal range of motion and neck supple.      Comments: Varicose LLE    Lymphadenopathy:      Cervical: Cervical adenopathy present.   Skin:     General: Skin is warm and dry.      Capillary Refill: Capillary refill takes less than 2 seconds.   Neurological:      General: No focal deficit present.      Mental Status: She is alert and oriented to person, place, and time. Mental status is at baseline.   Psychiatric:         Mood and Affect: Mood normal.         Behavior: Behavior normal.         Thought Content: Thought content normal.         Judgment: Judgment normal.        Assessment and Plan (including Health Maintenance)      Problem List  Smart Sets  Document Outside HM   :    Plan:         Health Maintenance Due   Topic Date Due    COVID-19 Vaccine (1) Never done       Problem List Items Addressed This Visit          ENT    Sinusitis - Primary       Pulmonary    Cough    Relevant Orders    POCT rapid strep A (Completed)    POCT Influenza A/B Rapid Antigen (Completed)       Endocrine    BMI 38.0-38.9,adult       Health Maintenance Topics with due status: Not Due       Topic Last Completion Date    Mammogram 09/14/2022    TETANUS VACCINE 03/06/2023    Hemoglobin A1c (Diabetic Prevention Screening) 03/07/2023    Lipid Panel 03/07/2023       Future Appointments   Date Time Provider Department Center   7/20/2023  9:30 AM [facility] GI ROOM 02 [facility]   9/11/2023  8:00 AM Alvina Shi DNP [facility]   9/14/2023  9:30 AM Ed Yang, DO [facility]   9/20/2023  9:15 AM [facility]            Signature:  Alvina Shi DNP      [address] [place] [address]    Date of encounter: 4/20/23

## 2023-05-23 ENCOUNTER — OFFICE VISIT (OUTPATIENT)
Dept: FAMILY MEDICINE | Facility: CLINIC | Age: 48
End: 2023-05-23
Payer: MEDICAID

## 2023-05-23 VITALS
HEIGHT: 65 IN | HEART RATE: 78 BPM | TEMPERATURE: 98 F | RESPIRATION RATE: 18 BRPM | SYSTOLIC BLOOD PRESSURE: 106 MMHG | BODY MASS INDEX: 39.99 KG/M2 | DIASTOLIC BLOOD PRESSURE: 63 MMHG | WEIGHT: 240 LBS

## 2023-05-23 DIAGNOSIS — L02.31 ABSCESS OF BUTTOCK, RIGHT: ICD-10-CM

## 2023-05-23 DIAGNOSIS — L02.211 ABDOMINAL WALL ABSCESS: ICD-10-CM

## 2023-05-23 DIAGNOSIS — R10.9 ABDOMINAL PAIN, UNSPECIFIED ABDOMINAL LOCATION: ICD-10-CM

## 2023-05-23 DIAGNOSIS — R22.42 LOCALIZED SWELLING OF LEFT FOOT: ICD-10-CM

## 2023-05-23 DIAGNOSIS — M79.672 LEFT FOOT PAIN: Primary | ICD-10-CM

## 2023-05-23 LAB
BILIRUB SERPL-MCNC: ABNORMAL MG/DL
BLOOD URINE, POC: ABNORMAL
COLOR, POC UA: YELLOW
GLUCOSE UR QL STRIP: ABNORMAL
KETONES UR QL STRIP: ABNORMAL
LEUKOCYTE ESTERASE URINE, POC: ABNORMAL
NITRITE, POC UA: ABNORMAL
PH, POC UA: 6
PROTEIN, POC: ABNORMAL
SPECIFIC GRAVITY, POC UA: 1.02
URATE SERPL-MCNC: 4.6 MG/DL (ref 2.6–6)
UROBILINOGEN, POC UA: 1

## 2023-05-23 PROCEDURE — 84550 URIC ACID: ICD-10-PCS | Mod: ,,, | Performed by: CLINICAL MEDICAL LABORATORY

## 2023-05-23 PROCEDURE — 3008F PR BODY MASS INDEX (BMI) DOCUMENTED: ICD-10-PCS | Mod: ,,, | Performed by: NURSE PRACTITIONER

## 2023-05-23 PROCEDURE — 3078F PR MOST RECENT DIASTOLIC BLOOD PRESSURE < 80 MM HG: ICD-10-PCS | Mod: ,,, | Performed by: NURSE PRACTITIONER

## 2023-05-23 PROCEDURE — 84550 ASSAY OF BLOOD/URIC ACID: CPT | Mod: ,,, | Performed by: CLINICAL MEDICAL LABORATORY

## 2023-05-23 PROCEDURE — 3074F SYST BP LT 130 MM HG: CPT | Mod: ,,, | Performed by: NURSE PRACTITIONER

## 2023-05-23 PROCEDURE — 3078F DIAST BP <80 MM HG: CPT | Mod: ,,, | Performed by: NURSE PRACTITIONER

## 2023-05-23 PROCEDURE — 81003 URINALYSIS AUTO W/O SCOPE: CPT | Mod: QW,,, | Performed by: NURSE PRACTITIONER

## 2023-05-23 PROCEDURE — 99213 OFFICE O/P EST LOW 20 MIN: CPT | Mod: 25,,, | Performed by: NURSE PRACTITIONER

## 2023-05-23 PROCEDURE — 96372 THER/PROPH/DIAG INJ SC/IM: CPT | Mod: ,,, | Performed by: NURSE PRACTITIONER

## 2023-05-23 PROCEDURE — 87086 URINE CULTURE/COLONY COUNT: CPT | Mod: ,,, | Performed by: CLINICAL MEDICAL LABORATORY

## 2023-05-23 PROCEDURE — 87086 CULTURE, URINE: ICD-10-PCS | Mod: ,,, | Performed by: CLINICAL MEDICAL LABORATORY

## 2023-05-23 PROCEDURE — 99213 PR OFFICE/OUTPT VISIT, EST, LEVL III, 20-29 MIN: ICD-10-PCS | Mod: 25,,, | Performed by: NURSE PRACTITIONER

## 2023-05-23 PROCEDURE — 96372 PR INJECTION,THERAP/PROPH/DIAG2ST, IM OR SUBCUT: ICD-10-PCS | Mod: ,,, | Performed by: NURSE PRACTITIONER

## 2023-05-23 PROCEDURE — 81003 POCT URINALYSIS W/O SCOPE: ICD-10-PCS | Mod: QW,,, | Performed by: NURSE PRACTITIONER

## 2023-05-23 PROCEDURE — 3008F BODY MASS INDEX DOCD: CPT | Mod: ,,, | Performed by: NURSE PRACTITIONER

## 2023-05-23 PROCEDURE — 3074F PR MOST RECENT SYSTOLIC BLOOD PRESSURE < 130 MM HG: ICD-10-PCS | Mod: ,,, | Performed by: NURSE PRACTITIONER

## 2023-05-23 RX ORDER — DEXAMETHASONE SODIUM PHOSPHATE 4 MG/ML
4 INJECTION, SOLUTION INTRA-ARTICULAR; INTRALESIONAL; INTRAMUSCULAR; INTRAVENOUS; SOFT TISSUE
Status: COMPLETED | OUTPATIENT
Start: 2023-05-23 | End: 2023-05-23

## 2023-05-23 RX ORDER — KETOROLAC TROMETHAMINE 30 MG/ML
60 INJECTION, SOLUTION INTRAMUSCULAR; INTRAVENOUS
Status: COMPLETED | OUTPATIENT
Start: 2023-05-23 | End: 2023-05-23

## 2023-05-23 RX ORDER — CLINDAMYCIN HYDROCHLORIDE 300 MG/1
300 CAPSULE ORAL 3 TIMES DAILY
Qty: 30 CAPSULE | Refills: 0 | Status: SHIPPED | OUTPATIENT
Start: 2023-05-23 | End: 2023-09-11

## 2023-05-23 RX ORDER — COLCHICINE 0.6 MG/1
TABLET ORAL
Qty: 8 TABLET | Refills: 0 | Status: SHIPPED | OUTPATIENT
Start: 2023-05-23 | End: 2024-02-05

## 2023-05-23 RX ORDER — DICLOFENAC SODIUM 50 MG/1
50 TABLET, DELAYED RELEASE ORAL 2 TIMES DAILY
Qty: 60 TABLET | Refills: 1 | Status: SHIPPED | OUTPATIENT
Start: 2023-05-23 | End: 2023-11-15 | Stop reason: SDUPTHER

## 2023-05-23 RX ADMIN — DEXAMETHASONE SODIUM PHOSPHATE 4 MG: 4 INJECTION, SOLUTION INTRA-ARTICULAR; INTRALESIONAL; INTRAMUSCULAR; INTRAVENOUS; SOFT TISSUE at 02:05

## 2023-05-23 RX ADMIN — KETOROLAC TROMETHAMINE 60 MG: 30 INJECTION, SOLUTION INTRAMUSCULAR; INTRAVENOUS at 02:05

## 2023-05-23 NOTE — PROGRESS NOTES
Alvina Shi DNP   [address] [place] [address]     PATIENT NAME: Maury Mckenzie  : 1975  DATE: 23  MRN: [id number]      Billing Provider: Alvina Shi DNP  Level of Service:   Patient PCP Information       Provider PCP Type    Alvina Shi DNP General            Reason for Visit / Chief Complaint: Foot Swelling (Left foot swelling)       Update PCP  Update Chief Complaint         History of Present Illness / Problem Focused Workflow     Maury Mckenzie presents to the clinic with Foot Swelling (Left foot swelling)     HPI    Review of Systems     Review of Systems     Medical / Social / Family History     Past Medical History:   Diagnosis Date    Carpal tunnel syndrome     Hyperlipidemia     Hypertension     Hypothyroidism     IBS (irritable bowel syndrome)        Past Surgical History:   Procedure Laterality Date    CARDIAC CATHETERIZATION      Dr. Beatty    CARPAL TUNNEL RELEASE Left     Dr. Byers     SECTION  1996    x 1    HYSTERECTOMY      LASER ABLATION Left 2019    Left GSV Laser Ablation performed by Dr. Juan Yang    LASER ABLATION Left 09/10/2021    Procedure: Left distal GSV Remnant Laser Ablation;  Surgeon: Ed Yang DO;  Location: [facility] [place];  Service: Vascular;  Laterality: Left;    Left Ilioinguinal NB Left     DR MCPHERSON    OOPHORECTOMY      TOTAL ABDOMINAL HYSTERECTOMY      Dr. Christine       Social History  Ms.  reports that she has never smoked. She has never used smokeless tobacco. She reports that she does not currently use alcohol. She reports that she does not use drugs.    Family History  Ms.'s family history includes Hypertension in her mother; No Known Problems in her brother, father, and sister.    Medications and Allergies     Medications  No outpatient medications have been marked as taking for the 23 encounter (Office Visit) with Alvina Shi DNP.     Current  "Facility-Administered Medications for the 5/23/23 encounter (Office Visit) with Alvina Shi DNP   Medication Dose Route Frequency Provider Last Rate Last Admin    [COMPLETED] dexAMETHasone injection 4 mg  4 mg Intramuscular 1 time in Clinic/HOD Alvina Shi DNP   4 mg at 05/23/23 1445    [COMPLETED] ketorolac injection 60 mg  60 mg Intramuscular 1 time in Clinic/HOD Alvina Shi DNP   60 mg at 05/23/23 1446       Allergies  Review of patient's allergies indicates:  No Known Allergies    Physical Examination   /63 (BP Location: Left arm, Patient Position: Sitting, BP Method: Large (Automatic))   Pulse 78   Temp 98.2 °F (36.8 °C) (Oral)   Resp 18   Ht 5' 5" (1.651 m)   Wt 108.9 kg (240 lb)   BMI 39.94 kg/m²    Physical Exam  Vitals and nursing note reviewed.   Constitutional:       Appearance: Normal appearance. She is normal weight.   HENT:      Head: Normocephalic.      Nose: Nose normal.      Mouth/Throat:      Mouth: Mucous membranes are moist.   Eyes:      Extraocular Movements: Extraocular movements intact.      Conjunctiva/sclera: Conjunctivae normal.      Pupils: Pupils are equal, round, and reactive to light.   Cardiovascular:      Rate and Rhythm: Normal rate and regular rhythm.      Pulses: Normal pulses.      Heart sounds: Normal heart sounds.   Pulmonary:      Effort: Pulmonary effort is normal.      Breath sounds: Normal breath sounds.   Musculoskeletal:         General: Swelling, tenderness and signs of injury present.      Cervical back: Normal range of motion.      Comments: Bruising to left side of foot. Egg shaped   Skin:     General: Skin is warm and dry.      Capillary Refill: Capillary refill takes less than 2 seconds.      Findings: Lesion present.      Comments: Small pea sized hard erythematous lesion to rt buttock    Neurological:      General: No focal deficit present.      Mental Status: She is alert and oriented to person, place, and time. Mental status is at " baseline.   Psychiatric:         Mood and Affect: Mood normal.         Behavior: Behavior normal.         Thought Content: Thought content normal.         Judgment: Judgment normal.        Assessment and Plan (including Health Maintenance)      Problem List  Smart Sets  Document Outside HM   :    Plan:         Health Maintenance Due   Topic Date Due    COVID-19 Vaccine (1) Never done       Problem List Items Addressed This Visit          ID    Abdominal wall abscess    Relevant Medications    clindamycin (CLEOCIN) 300 MG capsule    Abscess of buttock, right       Endocrine    BMI 38.0-38.9,adult       GI    Abdominal pain    Relevant Orders    POCT URINALYSIS W/O SCOPE (Completed)    Urine culture       Orthopedic    Left foot pain - Primary    Relevant Orders    Uric Acid       Other    Localized swelling of left foot       Health Maintenance Topics with due status: Not Due       Topic Last Completion Date    Mammogram 09/14/2022    TETANUS VACCINE 03/06/2023    Hemoglobin A1c (Diabetic Prevention Screening) 03/07/2023    Lipid Panel 03/07/2023       Future Appointments   Date Time Provider Department Center   7/20/2023  9:30 AM [facility] ROOM 02 [facility]   9/11/2023  8:00 AM Alvina Shi DNP [facility]   9/14/2023  9:30 AM Ed Yang DO [facility]   9/20/2023  9:15 AM [facility]            Signature:  Alvina Shi DNP      [address] [place] [address]    Date of encounter: 5/23/23

## 2023-05-25 LAB — UA COMPLETE W REFLEX CULTURE PNL UR: NORMAL

## 2023-06-05 ENCOUNTER — OFFICE VISIT (OUTPATIENT)
Dept: FAMILY MEDICINE | Facility: CLINIC | Age: 48
End: 2023-06-05
Payer: MEDICAID

## 2023-06-05 VITALS
BODY MASS INDEX: 39.82 KG/M2 | OXYGEN SATURATION: 100 % | HEART RATE: 80 BPM | DIASTOLIC BLOOD PRESSURE: 77 MMHG | TEMPERATURE: 98 F | HEIGHT: 65 IN | SYSTOLIC BLOOD PRESSURE: 134 MMHG | WEIGHT: 239 LBS | RESPIRATION RATE: 18 BRPM

## 2023-06-05 DIAGNOSIS — R22.42 LOCALIZED SWELLING OF LEFT FOOT: ICD-10-CM

## 2023-06-05 DIAGNOSIS — S93.492A SPRAIN OF OTHER LIGAMENT OF LEFT ANKLE, INITIAL ENCOUNTER: Primary | ICD-10-CM

## 2023-06-05 DIAGNOSIS — M25.572 ACUTE LEFT ANKLE PAIN: ICD-10-CM

## 2023-06-05 PROBLEM — S93.402A SPRAIN OF LEFT ANKLE: Status: ACTIVE | Noted: 2023-06-05

## 2023-06-05 PROCEDURE — 96372 PR INJECTION,THERAP/PROPH/DIAG2ST, IM OR SUBCUT: ICD-10-PCS | Mod: ,,, | Performed by: NURSE PRACTITIONER

## 2023-06-05 PROCEDURE — 96372 THER/PROPH/DIAG INJ SC/IM: CPT | Mod: ,,, | Performed by: NURSE PRACTITIONER

## 2023-06-05 PROCEDURE — 99213 OFFICE O/P EST LOW 20 MIN: CPT | Mod: 25,,, | Performed by: NURSE PRACTITIONER

## 2023-06-05 PROCEDURE — 3078F PR MOST RECENT DIASTOLIC BLOOD PRESSURE < 80 MM HG: ICD-10-PCS | Mod: ,,, | Performed by: NURSE PRACTITIONER

## 2023-06-05 PROCEDURE — 3078F DIAST BP <80 MM HG: CPT | Mod: ,,, | Performed by: NURSE PRACTITIONER

## 2023-06-05 PROCEDURE — 3008F BODY MASS INDEX DOCD: CPT | Mod: ,,, | Performed by: NURSE PRACTITIONER

## 2023-06-05 PROCEDURE — 99213 PR OFFICE/OUTPT VISIT, EST, LEVL III, 20-29 MIN: ICD-10-PCS | Mod: 25,,, | Performed by: NURSE PRACTITIONER

## 2023-06-05 PROCEDURE — 3008F PR BODY MASS INDEX (BMI) DOCUMENTED: ICD-10-PCS | Mod: ,,, | Performed by: NURSE PRACTITIONER

## 2023-06-05 RX ORDER — KETOROLAC TROMETHAMINE 30 MG/ML
60 INJECTION, SOLUTION INTRAMUSCULAR; INTRAVENOUS
Status: COMPLETED | OUTPATIENT
Start: 2023-06-05 | End: 2023-06-05

## 2023-06-05 RX ORDER — DEXAMETHASONE SODIUM PHOSPHATE 4 MG/ML
4 INJECTION, SOLUTION INTRA-ARTICULAR; INTRALESIONAL; INTRAMUSCULAR; INTRAVENOUS; SOFT TISSUE
Status: COMPLETED | OUTPATIENT
Start: 2023-06-05 | End: 2023-06-05

## 2023-06-05 RX ORDER — METHYLPREDNISOLONE 4 MG/1
TABLET ORAL
Qty: 21 TABLET | Refills: 0 | Status: SHIPPED | OUTPATIENT
Start: 2023-06-05 | End: 2023-11-15

## 2023-06-05 RX ORDER — DICLOFENAC SODIUM 50 MG/1
50 TABLET, DELAYED RELEASE ORAL 2 TIMES DAILY
Qty: 60 TABLET | Refills: 1 | Status: SHIPPED | OUTPATIENT
Start: 2023-06-05 | End: 2023-11-15 | Stop reason: SDUPTHER

## 2023-06-05 RX ADMIN — DEXAMETHASONE SODIUM PHOSPHATE 4 MG: 4 INJECTION, SOLUTION INTRA-ARTICULAR; INTRALESIONAL; INTRAMUSCULAR; INTRAVENOUS; SOFT TISSUE at 04:06

## 2023-06-05 RX ADMIN — KETOROLAC TROMETHAMINE 60 MG: 30 INJECTION, SOLUTION INTRAMUSCULAR; INTRAVENOUS at 04:06

## 2023-06-05 NOTE — PROGRESS NOTES
Alvina Shi DNP   [address] [place] [address]     PATIENT NAME: Maury Mckenzie  : 1975  DATE: 23  MRN: [id number]      Billing Provider: Alvina Shi DNP  Level of Service:   Patient PCP Information       Provider PCP Type    Alvina Shi DNP General            Reason for Visit / Chief Complaint: foot pain (Left foot pain)       Update PCP  Update Chief Complaint         History of Present Illness / Problem Focused Workflow     Maury Mckenzie presents to the clinic with foot pain (Left foot pain)     HPI    Review of Systems     Review of Systems     Medical / Social / Family History     Past Medical History:   Diagnosis Date    Carpal tunnel syndrome     Hyperlipidemia     Hypertension     Hypothyroidism     IBS (irritable bowel syndrome)        Past Surgical History:   Procedure Laterality Date    CARDIAC CATHETERIZATION      Dr. Beatty    CARPAL TUNNEL RELEASE Left     Dr. Byers     SECTION  1996    x 1    HYSTERECTOMY      LASER ABLATION Left 2019    Left GSV Laser Ablation performed by Dr. Juan Yang    LASER ABLATION Left 09/10/2021    Procedure: Left distal GSV Remnant Laser Ablation;  Surgeon: Ed Yang DO;  Location: [facility];  Service: Vascular;  Laterality: Left;    Left Ilioinguinal NB Left     DR MCPHERSON    OOPHORECTOMY      TOTAL ABDOMINAL HYSTERECTOMY      Dr. Christine       Social History  Ms.  reports that she has never smoked. She has never used smokeless tobacco. She reports that she does not currently use alcohol. She reports that she does not use drugs.    Family History  Ms.'s family history includes Hypertension in her mother; No Known Problems in her brother, father, and sister.    Medications and Allergies     Medications  No outpatient medications have been marked as taking for the 23 encounter (Office Visit) with Alvina Shi DNP.     Current Facility-Administered Medications for  "the 6/5/23 encounter (Office Visit) with Alvina Shi DNP   Medication Dose Route Frequency Provider Last Rate Last Admin    [COMPLETED] dexAMETHasone injection 4 mg  4 mg Intramuscular 1 time in Clinic/HOD Alvina Shi DNP   4 mg at 06/05/23 1620    [COMPLETED] ketorolac injection 60 mg  60 mg Intramuscular 1 time in Clinic/HOD Alvina Shi DNP   60 mg at 06/05/23 1621       Allergies  Review of patient's allergies indicates:  No Known Allergies    Physical Examination   /77 (BP Location: Left arm, Patient Position: Sitting, BP Method: Large (Automatic))   Pulse 80   Temp 98.1 °F (36.7 °C) (Oral)   Resp 18   Ht 5' 5" (1.651 m)   Wt 108.4 kg (239 lb)   SpO2 100%   BMI 39.77 kg/m²    Physical Exam  Vitals and nursing note reviewed.   Constitutional:       Appearance: Normal appearance. She is normal weight.   HENT:      Head: Normocephalic.      Nose: Nose normal.      Mouth/Throat:      Mouth: Mucous membranes are moist.   Eyes:      Extraocular Movements: Extraocular movements intact.      Conjunctiva/sclera: Conjunctivae normal.      Pupils: Pupils are equal, round, and reactive to light.   Cardiovascular:      Rate and Rhythm: Normal rate and regular rhythm.      Pulses: Normal pulses.      Heart sounds: Normal heart sounds.   Pulmonary:      Effort: Pulmonary effort is normal.      Breath sounds: Normal breath sounds.   Musculoskeletal:         General: Swelling, tenderness and signs of injury present.      Cervical back: Normal range of motion.   Skin:     General: Skin is warm and dry.      Capillary Refill: Capillary refill takes less than 2 seconds.   Neurological:      General: No focal deficit present.      Mental Status: She is alert and oriented to person, place, and time. Mental status is at baseline.   Psychiatric:         Mood and Affect: Mood normal.         Behavior: Behavior normal.         Thought Content: Thought content normal.         Judgment: Judgment normal.    "     Assessment and Plan (including Health Maintenance)      Problem List  Smart Sets  Document Outside HM   :    Plan:         Health Maintenance Due   Topic Date Due    COVID-19 Vaccine (1) Never done       Problem List Items Addressed This Visit          Endocrine    BMI 38.0-38.9,adult       Orthopedic    Sprain of left ankle - Primary    Acute left ankle pain       Other    Localized swelling of left foot       Health Maintenance Topics with due status: Not Due       Topic Last Completion Date    Mammogram 09/14/2022    TETANUS VACCINE 03/06/2023    Hemoglobin A1c (Diabetic Prevention Screening) 03/07/2023    Lipid Panel 03/07/2023       Future Appointments   Date Time Provider Department Center   7/20/2023  9:30 AM [facility] GI ROOM 02 [facility]   9/11/2023  8:00 AM Alvina Shi DNP [facility]   9/14/2023  9:30 AM Ed Yang DO [facility]   9/20/2023  9:15 AM [facility]            Signature:  Alvina Shi DNP      [address] [place] [address]    Date of encounter: 6/5/23

## 2023-07-19 ENCOUNTER — OFFICE VISIT (OUTPATIENT)
Dept: FAMILY MEDICINE | Facility: CLINIC | Age: 48
End: 2023-07-19
Payer: MEDICAID

## 2023-07-19 VITALS
TEMPERATURE: 98 F | BODY MASS INDEX: 39.49 KG/M2 | DIASTOLIC BLOOD PRESSURE: 71 MMHG | HEART RATE: 89 BPM | RESPIRATION RATE: 18 BRPM | WEIGHT: 237 LBS | SYSTOLIC BLOOD PRESSURE: 111 MMHG | OXYGEN SATURATION: 100 % | HEIGHT: 65 IN

## 2023-07-19 DIAGNOSIS — M70.22 OLECRANON BURSITIS OF LEFT ELBOW: Primary | ICD-10-CM

## 2023-07-19 DIAGNOSIS — M25.522 LEFT ELBOW PAIN: ICD-10-CM

## 2023-07-19 PROCEDURE — 99212 PR OFFICE/OUTPT VISIT, EST, LEVL II, 10-19 MIN: ICD-10-PCS | Mod: ,,, | Performed by: NURSE PRACTITIONER

## 2023-07-19 PROCEDURE — 3074F PR MOST RECENT SYSTOLIC BLOOD PRESSURE < 130 MM HG: ICD-10-PCS | Mod: ,,, | Performed by: NURSE PRACTITIONER

## 2023-07-19 PROCEDURE — 3074F SYST BP LT 130 MM HG: CPT | Mod: ,,, | Performed by: NURSE PRACTITIONER

## 2023-07-19 PROCEDURE — 3078F DIAST BP <80 MM HG: CPT | Mod: ,,, | Performed by: NURSE PRACTITIONER

## 2023-07-19 PROCEDURE — 3008F PR BODY MASS INDEX (BMI) DOCUMENTED: ICD-10-PCS | Mod: ,,, | Performed by: NURSE PRACTITIONER

## 2023-07-19 PROCEDURE — 3078F PR MOST RECENT DIASTOLIC BLOOD PRESSURE < 80 MM HG: ICD-10-PCS | Mod: ,,, | Performed by: NURSE PRACTITIONER

## 2023-07-19 PROCEDURE — 99212 OFFICE O/P EST SF 10 MIN: CPT | Mod: ,,, | Performed by: NURSE PRACTITIONER

## 2023-07-19 PROCEDURE — 3008F BODY MASS INDEX DOCD: CPT | Mod: ,,, | Performed by: NURSE PRACTITIONER

## 2023-07-19 RX ORDER — METHYLPREDNISOLONE 4 MG/1
TABLET ORAL
Qty: 21 TABLET | Refills: 0 | Status: SHIPPED | OUTPATIENT
Start: 2023-07-19 | End: 2023-11-15

## 2023-07-19 RX ORDER — DICLOFENAC SODIUM 50 MG/1
50 TABLET, DELAYED RELEASE ORAL 2 TIMES DAILY
Qty: 60 TABLET | Refills: 1 | Status: SHIPPED | OUTPATIENT
Start: 2023-07-19 | End: 2023-11-15 | Stop reason: SDUPTHER

## 2023-07-19 NOTE — PROGRESS NOTES
Alvina Shi DNP   [address] [place] [address]     PATIENT NAME: Maury Mckenzie  : 1975  DATE: 23  MRN: [id number]      Billing Provider: Alvina Shi DNP  Level of Service:   Patient PCP Information       Provider PCP Type    Alvina Shi DNP General            Reason for Visit / Chief Complaint: Hand Pain       Update PCP  Update Chief Complaint         History of Present Illness / Problem Focused Workflow     Maury Mckenzie presents to the clinic with Hand Pain     Hand Pain     Review of Systems     Review of Systems     Medical / Social / Family History     Past Medical History:   Diagnosis Date    Carpal tunnel syndrome     Hyperlipidemia     Hypertension     Hypothyroidism     IBS (irritable bowel syndrome)        Past Surgical History:   Procedure Laterality Date    CARDIAC CATHETERIZATION      Dr. Beatty    CARPAL TUNNEL RELEASE Left     Dr. Byers     SECTION  1996    x 1    HYSTERECTOMY      LASER ABLATION Left 2019    Left GSV Laser Ablation performed by Dr. Juan Yang    LASER ABLATION Left 09/10/2021    Procedure: Left distal GSV Remnant Laser Ablation;  Surgeon: Ed Yang DO;  Location: [facility];  Service: Vascular;  Laterality: Left;    Left Ilioinguinal NB Left     DR MCPHERSON    OOPHORECTOMY      TOTAL ABDOMINAL HYSTERECTOMY      Dr. Christine       Social History  Ms.  reports that she has never smoked. She has never used smokeless tobacco. She reports that she does not currently use alcohol. She reports that she does not use drugs.    Family History  Ms.'s family history includes Hypertension in her mother; No Known Problems in her brother, father, and sister.    Medications and Allergies     Medications  No outpatient medications have been marked as taking for the 23 encounter (Office Visit) with Alvina Shi DNP.       Allergies  Review of patient's allergies indicates:  No Known  "Allergies    Physical Examination   /71 (BP Location: Left arm, Patient Position: Sitting, BP Method: Medium (Automatic))   Pulse 89   Temp 98.1 °F (36.7 °C) (Oral)   Resp 18   Ht 5' 5" (1.651 m)   Wt 107.5 kg (237 lb)   SpO2 100%   BMI 39.44 kg/m²    Physical Exam  Vitals and nursing note reviewed.   Constitutional:       Appearance: She is obese.   HENT:      Head: Normocephalic.      Nose: Nose normal.      Mouth/Throat:      Mouth: Mucous membranes are moist.   Eyes:      Extraocular Movements: Extraocular movements intact.      Conjunctiva/sclera: Conjunctivae normal.      Pupils: Pupils are equal, round, and reactive to light.   Cardiovascular:      Rate and Rhythm: Normal rate and regular rhythm.      Heart sounds: Normal heart sounds.   Pulmonary:      Effort: Pulmonary effort is normal.      Breath sounds: Normal breath sounds.   Musculoskeletal:         General: Swelling and tenderness present.      Cervical back: Normal range of motion.      Comments: Warmth swelling tender over left elbow.   Skin:     General: Skin is warm and dry.      Capillary Refill: Capillary refill takes less than 2 seconds.   Neurological:      General: No focal deficit present.      Mental Status: She is alert and oriented to person, place, and time. Mental status is at baseline.   Psychiatric:         Mood and Affect: Mood normal.         Behavior: Behavior normal.         Thought Content: Thought content normal.         Judgment: Judgment normal.        Assessment and Plan (including Health Maintenance)      Problem List  Smart Sets  Document Outside HM   :    Plan:   Can try her colchicine she has at home to rule out gout.      Health Maintenance Due   Topic Date Due    COVID-19 Vaccine (1) Never done    Mammogram  09/14/2023       Problem List Items Addressed This Visit          Endocrine    BMI 38.0-38.9,adult       Orthopedic    Olecranon bursitis of left elbow - Primary    Left elbow pain       Health " Maintenance Topics with due status: Not Due       Topic Last Completion Date    TETANUS VACCINE 03/06/2023    Hemoglobin A1c (Diabetic Prevention Screening) 03/07/2023    Lipid Panel 03/07/2023       Future Appointments   Date Time Provider Department Center   7/20/2023  9:30 AM [facility] ROOM 02 [facility]   9/11/2023  8:00 AM Alvina Shi DNP [facility]   9/14/2023  9:30 AM Ed Yang DO [facility]   9/20/2023  9:15 AM [facility]            Signature:  Alvina Shi DNP      [address] [place] [address]    Date of encounter: 7/19/23

## 2023-09-06 ENCOUNTER — OFFICE VISIT (OUTPATIENT)
Dept: FAMILY MEDICINE | Facility: CLINIC | Age: 48
End: 2023-09-06
Payer: MEDICAID

## 2023-09-06 VITALS
BODY MASS INDEX: 40.02 KG/M2 | OXYGEN SATURATION: 97 % | HEART RATE: 74 BPM | TEMPERATURE: 98 F | SYSTOLIC BLOOD PRESSURE: 116 MMHG | WEIGHT: 240.19 LBS | DIASTOLIC BLOOD PRESSURE: 68 MMHG | HEIGHT: 65 IN

## 2023-09-06 DIAGNOSIS — R05.9 COUGH, UNSPECIFIED TYPE: ICD-10-CM

## 2023-09-06 DIAGNOSIS — J40 BRONCHITIS: Primary | ICD-10-CM

## 2023-09-06 LAB
CTP QC/QA: YES
FLUAV AG NPH QL: NEGATIVE
FLUBV AG NPH QL: NEGATIVE
S PYO RRNA THROAT QL PROBE: NEGATIVE
SARS-COV-2 AG RESP QL IA.RAPID: NEGATIVE

## 2023-09-06 PROCEDURE — 3008F PR BODY MASS INDEX (BMI) DOCUMENTED: ICD-10-PCS | Mod: ,,, | Performed by: NURSE PRACTITIONER

## 2023-09-06 PROCEDURE — 3074F PR MOST RECENT SYSTOLIC BLOOD PRESSURE < 130 MM HG: ICD-10-PCS | Mod: ,,, | Performed by: NURSE PRACTITIONER

## 2023-09-06 PROCEDURE — 87880 POCT RAPID STREP A: ICD-10-PCS | Mod: QW,,, | Performed by: NURSE PRACTITIONER

## 2023-09-06 PROCEDURE — 99213 PR OFFICE/OUTPT VISIT, EST, LEVL III, 20-29 MIN: ICD-10-PCS | Mod: 25,,, | Performed by: NURSE PRACTITIONER

## 2023-09-06 PROCEDURE — 3078F DIAST BP <80 MM HG: CPT | Mod: ,,, | Performed by: NURSE PRACTITIONER

## 2023-09-06 PROCEDURE — 3074F SYST BP LT 130 MM HG: CPT | Mod: ,,, | Performed by: NURSE PRACTITIONER

## 2023-09-06 PROCEDURE — 87880 STREP A ASSAY W/OPTIC: CPT | Mod: QW,,, | Performed by: NURSE PRACTITIONER

## 2023-09-06 PROCEDURE — 96372 THER/PROPH/DIAG INJ SC/IM: CPT | Mod: ,,, | Performed by: NURSE PRACTITIONER

## 2023-09-06 PROCEDURE — 3008F BODY MASS INDEX DOCD: CPT | Mod: ,,, | Performed by: NURSE PRACTITIONER

## 2023-09-06 PROCEDURE — 99213 OFFICE O/P EST LOW 20 MIN: CPT | Mod: 25,,, | Performed by: NURSE PRACTITIONER

## 2023-09-06 PROCEDURE — 87426 SARS CORONAVIRUS 2 ANTIGEN POCT: ICD-10-PCS | Mod: QW,,, | Performed by: NURSE PRACTITIONER

## 2023-09-06 PROCEDURE — 87426 SARSCOV CORONAVIRUS AG IA: CPT | Mod: QW,,, | Performed by: NURSE PRACTITIONER

## 2023-09-06 PROCEDURE — 87804 INFLUENZA ASSAY W/OPTIC: CPT | Mod: QW,,, | Performed by: NURSE PRACTITIONER

## 2023-09-06 PROCEDURE — 3078F PR MOST RECENT DIASTOLIC BLOOD PRESSURE < 80 MM HG: ICD-10-PCS | Mod: ,,, | Performed by: NURSE PRACTITIONER

## 2023-09-06 PROCEDURE — 96372 PR INJECTION,THERAP/PROPH/DIAG2ST, IM OR SUBCUT: ICD-10-PCS | Mod: ,,, | Performed by: NURSE PRACTITIONER

## 2023-09-06 PROCEDURE — 87804 POCT INFLUENZA A/B: ICD-10-PCS | Mod: QW,,, | Performed by: NURSE PRACTITIONER

## 2023-09-06 RX ORDER — CEFTRIAXONE 1 G/1
1 INJECTION, POWDER, FOR SOLUTION INTRAMUSCULAR; INTRAVENOUS
Status: COMPLETED | OUTPATIENT
Start: 2023-09-06 | End: 2023-09-06

## 2023-09-06 RX ORDER — AZITHROMYCIN 250 MG/1
TABLET, FILM COATED ORAL
Qty: 6 TABLET | Refills: 0 | Status: SHIPPED | OUTPATIENT
Start: 2023-09-06 | End: 2023-11-15 | Stop reason: SDUPTHER

## 2023-09-06 RX ORDER — CETIRIZINE HYDROCHLORIDE 10 MG/1
10 TABLET ORAL DAILY
Qty: 30 TABLET | Refills: 0 | Status: SHIPPED | OUTPATIENT
Start: 2023-09-06 | End: 2023-11-15 | Stop reason: CLARIF

## 2023-09-06 RX ORDER — METHYLPREDNISOLONE 4 MG/1
TABLET ORAL
Qty: 21 TABLET | Refills: 0 | Status: SHIPPED | OUTPATIENT
Start: 2023-09-06 | End: 2023-11-15

## 2023-09-06 RX ORDER — BENZONATATE 100 MG/1
100 CAPSULE ORAL 3 TIMES DAILY PRN
Qty: 60 CAPSULE | Refills: 0 | Status: SHIPPED | OUTPATIENT
Start: 2023-09-06 | End: 2023-09-16

## 2023-09-06 RX ORDER — FLUTICASONE PROPIONATE 50 MCG
1 SPRAY, SUSPENSION (ML) NASAL DAILY
Qty: 11.1 ML | Refills: 0 | Status: SHIPPED | OUTPATIENT
Start: 2023-09-06

## 2023-09-06 RX ORDER — BETAMETHASONE SODIUM PHOSPHATE AND BETAMETHASONE ACETATE 3; 3 MG/ML; MG/ML
6 INJECTION, SUSPENSION INTRA-ARTICULAR; INTRALESIONAL; INTRAMUSCULAR; SOFT TISSUE
Status: COMPLETED | OUTPATIENT
Start: 2023-09-06 | End: 2023-09-06

## 2023-09-06 RX ADMIN — BETAMETHASONE SODIUM PHOSPHATE AND BETAMETHASONE ACETATE 6 MG: 3; 3 INJECTION, SUSPENSION INTRA-ARTICULAR; INTRALESIONAL; INTRAMUSCULAR; SOFT TISSUE at 11:09

## 2023-09-06 RX ADMIN — CEFTRIAXONE 1 G: 1 INJECTION, POWDER, FOR SOLUTION INTRAMUSCULAR; INTRAVENOUS at 11:09

## 2023-09-06 NOTE — PROGRESS NOTES
Alvina Shi DNP   [address] [place] [address]     PATIENT NAME: Maury Mckenzie  : 1975  DATE: 23  MRN: [id number]      Billing Provider: Alvina Shi DNP  Level of Service:   Patient PCP Information       Provider PCP Type    Alvina Shi DNP General            Reason for Visit / Chief Complaint: Sinus Problem       Update PCP  Update Chief Complaint         History of Present Illness / Problem Focused Workflow     Maury Mckenzie presents to the clinic with Sinus Problem     Sinus Problem    Review of Systems     Review of Systems     Medical / Social / Family History     Past Medical History:   Diagnosis Date    Carpal tunnel syndrome     Hyperlipidemia     Hypertension     Hypothyroidism     IBS (irritable bowel syndrome)        Past Surgical History:   Procedure Laterality Date    CARDIAC CATHETERIZATION      Dr. Beatty    CARPAL TUNNEL RELEASE Left     Dr. Byers     SECTION  1996    x 1    HYSTERECTOMY      LASER ABLATION Left 2019    Left GSV Laser Ablation performed by Dr. Juan Yang    LASER ABLATION Left 09/10/2021    Procedure: Left distal GSV Remnant Laser Ablation;  Surgeon: Ed Yang DO;  Location: [facility];  Service: Vascular;  Laterality: Left;    Left Ilioinguinal NB Left     DR MCPHERSON    OOPHORECTOMY      TOTAL ABDOMINAL HYSTERECTOMY      Dr. Christine       Social History  Ms.  reports that she has never smoked. She has never used smokeless tobacco. She reports that she does not currently use alcohol. She reports that she does not use drugs.    Family History  Ms.'s family history includes Hypertension in her mother; No Known Problems in her brother, father, and sister.    Medications and Allergies     Medications  No outpatient medications have been marked as taking for the 23 encounter (Office Visit) with Alvina Shi DNP.     Current Facility-Administered Medications for the 23  "encounter (Office Visit) with Alvina Shi DNP   Medication Dose Route Frequency Provider Last Rate Last Admin    [COMPLETED] betamethasone acetate-betamethasone sodium phosphate injection 6 mg  6 mg Intramuscular 1 time in Clinic/HOD Alvina Shi DNP   6 mg at 09/06/23 1109    [COMPLETED] cefTRIAXone injection 1 g  1 g Intramuscular 1 time in Clinic/HOD Alvina Shi DNP   1 g at 09/06/23 1109       Allergies  Review of patient's allergies indicates:  No Known Allergies    Physical Examination   /68   Pulse 74   Temp 98 °F (36.7 °C)   Ht 5' 5" (1.651 m)   Wt 109 kg (240 lb 3.2 oz)   SpO2 97%   BMI 39.97 kg/m²    Physical Exam  Vitals and nursing note reviewed.   Constitutional:       Appearance: She is obese. She is ill-appearing.   HENT:      Head: Normocephalic.      Nose: Congestion and rhinorrhea present.      Mouth/Throat:      Mouth: Mucous membranes are moist.      Pharynx: Posterior oropharyngeal erythema present.   Eyes:      Extraocular Movements: Extraocular movements intact.      Conjunctiva/sclera: Conjunctivae normal.      Pupils: Pupils are equal, round, and reactive to light.   Cardiovascular:      Rate and Rhythm: Normal rate and regular rhythm.      Pulses: Normal pulses.      Heart sounds: Normal heart sounds.   Pulmonary:      Effort: Pulmonary effort is normal.      Breath sounds: Wheezing present.   Chest:      Chest wall: Tenderness present.   Musculoskeletal:      Cervical back: Normal range of motion.   Skin:     General: Skin is warm and dry.      Capillary Refill: Capillary refill takes less than 2 seconds.   Neurological:      General: No focal deficit present.      Mental Status: She is alert and oriented to person, place, and time. Mental status is at baseline.   Psychiatric:         Mood and Affect: Mood normal.         Behavior: Behavior normal.         Thought Content: Thought content normal.         Judgment: Judgment normal.        Assessment and " Plan (including Health Maintenance)      Problem List  Smart Sets  Document Outside HM   :    Plan:         Health Maintenance Due   Topic Date Due    COVID-19 Vaccine (1) Never done    Mammogram  09/14/2023       Problem List Items Addressed This Visit          Pulmonary    Cough    Relevant Orders    SARS Coronavirus 2 Antigen, POCT (Completed)    POCT Influenza A/B (Completed)    POCT rapid strep A (Completed)    Bronchitis - Primary       Endocrine    BMI 38.0-38.9,adult       Health Maintenance Topics with due status: Not Due       Topic Last Completion Date    TETANUS VACCINE 03/06/2023    Hemoglobin A1c (Diabetic Prevention Screening) 03/07/2023    Lipid Panel 03/07/2023       Future Appointments   Date Time Provider Department Center   9/11/2023  8:00 AM Alvina Shi DNP [facility]   9/14/2023  1:15 PM Ed Yang DO [facility]   9/20/2023  9:15 AM [facility]   9/28/2023  8:00 AM [facility] ROOM 02 [facility]            Signature:  Alvina Shi DNP      [address] [place] [address]    Date of encounter: 9/6/23

## 2023-09-11 ENCOUNTER — OFFICE VISIT (OUTPATIENT)
Dept: FAMILY MEDICINE | Facility: CLINIC | Age: 48
End: 2023-09-11
Payer: MEDICAID

## 2023-09-11 VITALS
DIASTOLIC BLOOD PRESSURE: 85 MMHG | HEART RATE: 81 BPM | TEMPERATURE: 98 F | OXYGEN SATURATION: 98 % | HEIGHT: 65 IN | WEIGHT: 238 LBS | BODY MASS INDEX: 39.65 KG/M2 | SYSTOLIC BLOOD PRESSURE: 135 MMHG | RESPIRATION RATE: 18 BRPM

## 2023-09-11 DIAGNOSIS — J01.00 ACUTE NON-RECURRENT MAXILLARY SINUSITIS: ICD-10-CM

## 2023-09-11 DIAGNOSIS — E78.5 HYPERLIPIDEMIA, UNSPECIFIED HYPERLIPIDEMIA TYPE: ICD-10-CM

## 2023-09-11 DIAGNOSIS — I10 HYPERTENSION, UNSPECIFIED TYPE: ICD-10-CM

## 2023-09-11 DIAGNOSIS — R05.9 COUGH, UNSPECIFIED TYPE: ICD-10-CM

## 2023-09-11 DIAGNOSIS — E03.9 HYPOTHYROIDISM, UNSPECIFIED TYPE: Primary | ICD-10-CM

## 2023-09-11 DIAGNOSIS — I87.2 VENOUS INSUFFICIENCY: ICD-10-CM

## 2023-09-11 LAB
ALBUMIN SERPL BCP-MCNC: 3.4 G/DL (ref 3.5–5)
ALBUMIN/GLOB SERPL: 1 {RATIO}
ALP SERPL-CCNC: 55 U/L (ref 39–100)
ALT SERPL W P-5'-P-CCNC: 34 U/L (ref 13–56)
ANION GAP SERPL CALCULATED.3IONS-SCNC: 10 MMOL/L (ref 7–16)
AST SERPL W P-5'-P-CCNC: 21 U/L (ref 15–37)
BASOPHILS # BLD AUTO: 0.04 K/UL (ref 0–0.2)
BASOPHILS NFR BLD AUTO: 0.8 % (ref 0–1)
BILIRUB SERPL-MCNC: 0.5 MG/DL (ref ?–1.2)
BUN SERPL-MCNC: 15 MG/DL (ref 7–18)
BUN/CREAT SERPL: 17 (ref 6–20)
CALCIUM SERPL-MCNC: 9.1 MG/DL (ref 8.5–10.1)
CHLORIDE SERPL-SCNC: 108 MMOL/L (ref 98–107)
CHOLEST SERPL-MCNC: 186 MG/DL (ref 0–200)
CHOLEST/HDLC SERPL: 2.7 {RATIO}
CO2 SERPL-SCNC: 28 MMOL/L (ref 21–32)
CREAT SERPL-MCNC: 0.88 MG/DL (ref 0.55–1.02)
DIFFERENTIAL METHOD BLD: ABNORMAL
EGFR (NO RACE VARIABLE) (RUSH/TITUS): 81 ML/MIN/1.73M2
EOSINOPHIL # BLD AUTO: 0.55 K/UL (ref 0–0.5)
EOSINOPHIL NFR BLD AUTO: 10.4 % (ref 1–4)
ERYTHROCYTE [DISTWIDTH] IN BLOOD BY AUTOMATED COUNT: 14.6 % (ref 11.5–14.5)
GLOBULIN SER-MCNC: 3.4 G/DL (ref 2–4)
GLUCOSE SERPL-MCNC: 100 MG/DL (ref 74–106)
HCT VFR BLD AUTO: 40.1 % (ref 38–47)
HDLC SERPL-MCNC: 69 MG/DL (ref 40–60)
HGB BLD-MCNC: 12.7 G/DL (ref 12–16)
IMM GRANULOCYTES # BLD AUTO: 0.01 K/UL (ref 0–0.04)
IMM GRANULOCYTES NFR BLD: 0.2 % (ref 0–0.4)
LDLC SERPL CALC-MCNC: 102 MG/DL
LDLC/HDLC SERPL: 1.5 {RATIO}
LYMPHOCYTES # BLD AUTO: 1.95 K/UL (ref 1–4.8)
LYMPHOCYTES NFR BLD AUTO: 36.9 % (ref 27–41)
MCH RBC QN AUTO: 29.7 PG (ref 27–31)
MCHC RBC AUTO-ENTMCNC: 31.7 G/DL (ref 32–36)
MCV RBC AUTO: 93.7 FL (ref 80–96)
MONOCYTES # BLD AUTO: 0.39 K/UL (ref 0–0.8)
MONOCYTES NFR BLD AUTO: 7.4 % (ref 2–6)
MPC BLD CALC-MCNC: 11.2 FL (ref 9.4–12.4)
NEUTROPHILS # BLD AUTO: 2.34 K/UL (ref 1.8–7.7)
NEUTROPHILS NFR BLD AUTO: 44.3 % (ref 53–65)
NONHDLC SERPL-MCNC: 117 MG/DL
NRBC # BLD AUTO: 0 X10E3/UL
NRBC, AUTO (.00): 0 %
PLATELET # BLD AUTO: 210 K/UL (ref 150–400)
POTASSIUM SERPL-SCNC: 4.7 MMOL/L (ref 3.5–5.1)
PROT SERPL-MCNC: 6.8 G/DL (ref 6.4–8.2)
PTH-INTACT SERPL-MCNC: 99.8 PG/ML (ref 18.4–80.1)
RBC # BLD AUTO: 4.28 M/UL (ref 4.2–5.4)
SODIUM SERPL-SCNC: 141 MMOL/L (ref 136–145)
T3FREE SERPL-MCNC: 2.37 PG/ML (ref 2.18–3.98)
T4 FREE SERPL-MCNC: 0.99 NG/DL (ref 0.76–1.46)
THYROPEROXIDASE AB SERPL-ACNC: 230 U/ML (ref 0–60)
TRIGL SERPL-MCNC: 75 MG/DL (ref 35–150)
TSH SERPL DL<=0.005 MIU/L-ACNC: 3.04 UIU/ML (ref 0.36–3.74)
VLDLC SERPL-MCNC: 15 MG/DL
WBC # BLD AUTO: 5.28 K/UL (ref 4.5–11)

## 2023-09-11 PROCEDURE — 80061 LIPID PANEL: ICD-10-PCS | Mod: ,,, | Performed by: CLINICAL MEDICAL LABORATORY

## 2023-09-11 PROCEDURE — 84443 ASSAY THYROID STIM HORMONE: CPT | Mod: ,,, | Performed by: CLINICAL MEDICAL LABORATORY

## 2023-09-11 PROCEDURE — 84481 FREE ASSAY (FT-3): CPT | Mod: ,,, | Performed by: CLINICAL MEDICAL LABORATORY

## 2023-09-11 PROCEDURE — 3008F PR BODY MASS INDEX (BMI) DOCUMENTED: ICD-10-PCS | Mod: ,,, | Performed by: NURSE PRACTITIONER

## 2023-09-11 PROCEDURE — 83970 ASSAY OF PARATHORMONE: CPT | Mod: ,,, | Performed by: CLINICAL MEDICAL LABORATORY

## 2023-09-11 PROCEDURE — 84443 THYROID PANEL: ICD-10-PCS | Mod: ,,, | Performed by: CLINICAL MEDICAL LABORATORY

## 2023-09-11 PROCEDURE — 84481 T3, FREE: ICD-10-PCS | Mod: ,,, | Performed by: CLINICAL MEDICAL LABORATORY

## 2023-09-11 PROCEDURE — 3008F BODY MASS INDEX DOCD: CPT | Mod: ,,, | Performed by: NURSE PRACTITIONER

## 2023-09-11 PROCEDURE — 99213 PR OFFICE/OUTPT VISIT, EST, LEVL III, 20-29 MIN: ICD-10-PCS | Mod: ,,, | Performed by: NURSE PRACTITIONER

## 2023-09-11 PROCEDURE — 86376 MICROSOMAL ANTIBODY EACH: CPT | Mod: ,,, | Performed by: CLINICAL MEDICAL LABORATORY

## 2023-09-11 PROCEDURE — 80053 COMPREHEN METABOLIC PANEL: CPT | Mod: ,,, | Performed by: CLINICAL MEDICAL LABORATORY

## 2023-09-11 PROCEDURE — 84439 THYROID PANEL: ICD-10-PCS | Mod: ,,, | Performed by: CLINICAL MEDICAL LABORATORY

## 2023-09-11 PROCEDURE — 80061 LIPID PANEL: CPT | Mod: ,,, | Performed by: CLINICAL MEDICAL LABORATORY

## 2023-09-11 PROCEDURE — 84439 ASSAY OF FREE THYROXINE: CPT | Mod: ,,, | Performed by: CLINICAL MEDICAL LABORATORY

## 2023-09-11 PROCEDURE — 80053 COMPREHENSIVE METABOLIC PANEL: ICD-10-PCS | Mod: ,,, | Performed by: CLINICAL MEDICAL LABORATORY

## 2023-09-11 PROCEDURE — 85025 COMPLETE CBC W/AUTO DIFF WBC: CPT | Mod: ,,, | Performed by: CLINICAL MEDICAL LABORATORY

## 2023-09-11 PROCEDURE — 99213 OFFICE O/P EST LOW 20 MIN: CPT | Mod: ,,, | Performed by: NURSE PRACTITIONER

## 2023-09-11 PROCEDURE — 86376 THYROID PEROXIDASE ANTIBODY: ICD-10-PCS | Mod: ,,, | Performed by: CLINICAL MEDICAL LABORATORY

## 2023-09-11 PROCEDURE — 83970 PTH, INTACT: ICD-10-PCS | Mod: ,,, | Performed by: CLINICAL MEDICAL LABORATORY

## 2023-09-11 PROCEDURE — 85025 CBC WITH DIFFERENTIAL: ICD-10-PCS | Mod: ,,, | Performed by: CLINICAL MEDICAL LABORATORY

## 2023-09-11 RX ORDER — LEVOTHYROXINE SODIUM 25 UG/1
25 TABLET ORAL
Qty: 90 TABLET | Refills: 1 | Status: SHIPPED | OUTPATIENT
Start: 2023-09-11 | End: 2024-02-05 | Stop reason: SDUPTHER

## 2023-09-11 RX ORDER — METHYLPREDNISOLONE 4 MG/1
TABLET ORAL
Qty: 21 TABLET | Refills: 0 | Status: SHIPPED | OUTPATIENT
Start: 2023-09-11 | End: 2023-11-15

## 2023-09-11 RX ORDER — PRAVASTATIN SODIUM 40 MG/1
40 TABLET ORAL NIGHTLY
Qty: 90 TABLET | Refills: 1 | Status: SHIPPED | OUTPATIENT
Start: 2023-09-11 | End: 2024-02-05 | Stop reason: SDUPTHER

## 2023-09-11 RX ORDER — CLONIDINE HYDROCHLORIDE 0.2 MG/1
0.2 TABLET ORAL NIGHTLY
Qty: 90 TABLET | Refills: 1 | Status: SHIPPED | OUTPATIENT
Start: 2023-09-11 | End: 2024-02-05 | Stop reason: SDUPTHER

## 2023-09-11 RX ORDER — AZITHROMYCIN 250 MG/1
TABLET, FILM COATED ORAL
Qty: 6 TABLET | Refills: 0 | Status: SHIPPED | OUTPATIENT
Start: 2023-09-11 | End: 2023-11-15

## 2023-09-11 RX ORDER — OLMESARTAN MEDOXOMIL 5 MG/1
5 TABLET ORAL DAILY
Qty: 90 TABLET | Refills: 1 | Status: SHIPPED | OUTPATIENT
Start: 2023-09-11 | End: 2024-02-05 | Stop reason: SDUPTHER

## 2023-09-11 RX ORDER — BENZONATATE 100 MG/1
100 CAPSULE ORAL 3 TIMES DAILY PRN
Qty: 60 CAPSULE | Refills: 1 | Status: SHIPPED | OUTPATIENT
Start: 2023-09-11 | End: 2023-09-21

## 2023-09-11 RX ORDER — FUROSEMIDE 20 MG/1
20 TABLET ORAL DAILY
Qty: 90 TABLET | Refills: 1 | Status: SHIPPED | OUTPATIENT
Start: 2023-09-11 | End: 2024-02-05 | Stop reason: SDUPTHER

## 2023-09-11 RX ORDER — LINACLOTIDE 290 UG/1
290 CAPSULE, GELATIN COATED ORAL DAILY
Qty: 90 CAPSULE | Refills: 1 | Status: SHIPPED | OUTPATIENT
Start: 2023-09-11

## 2023-09-11 NOTE — PROGRESS NOTES
Alvina Shi DNP   [address] [place] [address]     PATIENT NAME: Maury Mckenzie  : 1975  DATE: 23  MRN: [id number]      Billing Provider: Alvina Shi DNP  Level of Service:   Patient PCP Information       Provider PCP Type    Alvina Shi DNP General            Reason for Visit / Chief Complaint: here for fu appt       Update PCP  Update Chief Complaint         History of Present Illness / Problem Focused Workflow     Maury Mckenzie presents to the clinic with No chief complaint on file.     HPI    Review of Systems     Review of Systems     Medical / Social / Family History     Past Medical History:   Diagnosis Date    Carpal tunnel syndrome     Hyperlipidemia     Hypertension     Hypothyroidism     IBS (irritable bowel syndrome)        Past Surgical History:   Procedure Laterality Date    CARDIAC CATHETERIZATION      Dr. Beatty    CARPAL TUNNEL RELEASE Left     Dr. Byers     SECTION  1996    x 1    HYSTERECTOMY      LASER ABLATION Left 2019    Left GSV Laser Ablation performed by Dr. Juan Yang    LASER ABLATION Left 09/10/2021    Procedure: Left distal GSV Remnant Laser Ablation;  Surgeon: Ed Yang DO;  Location: [facility];  Service: Vascular;  Laterality: Left;    Left Ilioinguinal NB Left     DR MCPHERSON    OOPHORECTOMY      TOTAL ABDOMINAL HYSTERECTOMY      Dr. Christine       Social History  Ms.  reports that she has never smoked. She has never used smokeless tobacco. She reports that she does not currently use alcohol. She reports that she does not use drugs.    Family History  Ms.'s family history includes Hypertension in her mother; No Known Problems in her brother, father, and sister.    Medications and Allergies     Medications  No outpatient medications have been marked as taking for the 23 encounter (Appointment) with Alvina Shi DNP.       Allergies  Review of patient's allergies  indicates:  No Known Allergies    Physical Examination   There were no vitals taken for this visit.   Physical Exam  Vitals and nursing note reviewed.   Constitutional:       Appearance: Normal appearance. She is obese. She is ill-appearing.   HENT:      Head: Normocephalic.      Nose: Congestion and rhinorrhea present.      Mouth/Throat:      Mouth: Mucous membranes are moist.      Pharynx: Posterior oropharyngeal erythema present.   Eyes:      Extraocular Movements: Extraocular movements intact.      Conjunctiva/sclera: Conjunctivae normal.      Pupils: Pupils are equal, round, and reactive to light.   Cardiovascular:      Rate and Rhythm: Normal rate and regular rhythm.      Pulses: Normal pulses.      Heart sounds: Normal heart sounds.   Pulmonary:      Effort: Pulmonary effort is normal.      Breath sounds: Normal breath sounds.   Chest:      Chest wall: Tenderness present.   Abdominal:      General: Abdomen is flat. Bowel sounds are normal.      Palpations: Abdomen is soft.   Musculoskeletal:         General: Normal range of motion.      Cervical back: Normal range of motion and neck supple.      Comments: Varicose LLE    Lymphadenopathy:      Cervical: Cervical adenopathy present.   Skin:     General: Skin is warm and dry.      Capillary Refill: Capillary refill takes less than 2 seconds.      Comments: Large grapefruit sized cellulitis rt rt lower abd. With tenderness,. Open lesion dime sized with serosanguinous fluid. Cleaned with soap and water.  Culture obtained.   Applied mupirocin and abd pad taped,     Neurological:      General: No focal deficit present.      Mental Status: She is alert and oriented to person, place, and time. Mental status is at baseline.   Psychiatric:         Mood and Affect: Mood normal.         Behavior: Behavior normal.         Thought Content: Thought content normal.         Judgment: Judgment normal.        Assessment and Plan (including Health Maintenance)      Problem List   Smart Sets  Document Outside HM   :    Plan:     here for fu appt.        mmg 9/2022 scheduled for 9/20  Had pap 4/2021 pt to make appt    declined flu and covid vaccines  optho - >2 years,. Number given to call  - [facility] [place] *make appt      Colonoscopy - no fam hx ordered now since guidelines changed - reordered for c'scopy states had one years ago.  Scheduled for 9/28  Tdap 3/2023  use mobic sparingly seen PM in the past.      Nonsmoker. Takes asa 81mg daily.    card - 10/2021 saluhuddin - yearly.   [facility]in center 10/2021  Keep f/u with Trey. saw [facility] 1/2021 q6 months has procedure in past 9/14/2023     Keep f/u with Nael for CTS surgery. Left first, then right. last seen 9/2020 no change   treat sinusitis today    Health Maintenance Due   Topic Date Due    COVID-19 Vaccine (1) Never done    Mammogram  09/14/2023       Problem List Items Addressed This Visit    None      Health Maintenance Topics with due status: Not Due       Topic Last Completion Date    TETANUS VACCINE 03/06/2023    Hemoglobin A1c (Diabetic Prevention Screening) 03/07/2023    Lipid Panel 03/07/2023       Future Appointments   Date Time Provider Department Center   9/11/2023  8:00 AM Alvina Shi DNP [facility]   9/14/2023  1:15 PM Ed Yang DO [facility]   9/20/2023  9:15 AM [facility]   9/28/2023  8:00 AM [facility] GI ROOM 02 [facility]            Signature:  Alvina Shi DNP      [address] [place] [address]    Date of encounter: 9/11/23

## 2023-09-13 ENCOUNTER — TELEPHONE (OUTPATIENT)
Dept: FAMILY MEDICINE | Facility: CLINIC | Age: 48
End: 2023-09-13
Payer: MEDICAID

## 2023-09-13 DIAGNOSIS — E03.9 HYPOTHYROIDISM, UNSPECIFIED TYPE: Primary | ICD-10-CM

## 2023-09-13 NOTE — TELEPHONE ENCOUNTER
Patient on thyroid med Synthroid 25mcg    I did order ultrasound but uptake will have to be off meds

## 2023-09-13 NOTE — TELEPHONE ENCOUNTER
----- Message from Alvina Shi DNP sent at 9/12/2023  7:53 PM CDT -----  Needs thyroid US and uptake scan will probably have to order then call over there to get her appt see what day is best for her

## 2023-09-14 ENCOUNTER — OFFICE VISIT (OUTPATIENT)
Dept: VASCULAR SURGERY | Facility: CLINIC | Age: 48
End: 2023-09-14
Payer: MEDICAID

## 2023-09-14 VITALS
BODY MASS INDEX: 39.32 KG/M2 | HEIGHT: 65 IN | DIASTOLIC BLOOD PRESSURE: 76 MMHG | SYSTOLIC BLOOD PRESSURE: 116 MMHG | WEIGHT: 236 LBS | OXYGEN SATURATION: 95 % | RESPIRATION RATE: 16 BRPM | HEART RATE: 90 BPM

## 2023-09-14 DIAGNOSIS — M79.672 PAIN IN LEFT FOOT: ICD-10-CM

## 2023-09-14 DIAGNOSIS — M25.562 PAIN AND SWELLING OF LEFT KNEE: ICD-10-CM

## 2023-09-14 DIAGNOSIS — R60.0 EDEMA, LOWER EXTREMITY: ICD-10-CM

## 2023-09-14 DIAGNOSIS — M25.462 PAIN AND SWELLING OF LEFT KNEE: ICD-10-CM

## 2023-09-14 DIAGNOSIS — I87.2 VENOUS INSUFFICIENCY: ICD-10-CM

## 2023-09-14 DIAGNOSIS — M79.605 PAIN OF LEFT LOWER EXTREMITY: Primary | ICD-10-CM

## 2023-09-14 PROCEDURE — 3078F DIAST BP <80 MM HG: CPT | Mod: ,,, | Performed by: FAMILY MEDICINE

## 2023-09-14 PROCEDURE — 3074F SYST BP LT 130 MM HG: CPT | Mod: ,,, | Performed by: FAMILY MEDICINE

## 2023-09-14 PROCEDURE — 3008F PR BODY MASS INDEX (BMI) DOCUMENTED: ICD-10-PCS | Mod: ,,, | Performed by: FAMILY MEDICINE

## 2023-09-14 PROCEDURE — 3008F BODY MASS INDEX DOCD: CPT | Mod: ,,, | Performed by: FAMILY MEDICINE

## 2023-09-14 PROCEDURE — 99215 OFFICE O/P EST HI 40 MIN: CPT | Mod: PBBFAC | Performed by: FAMILY MEDICINE

## 2023-09-14 PROCEDURE — 3078F PR MOST RECENT DIASTOLIC BLOOD PRESSURE < 80 MM HG: ICD-10-PCS | Mod: ,,, | Performed by: FAMILY MEDICINE

## 2023-09-14 PROCEDURE — 99213 OFFICE O/P EST LOW 20 MIN: CPT | Mod: S$PBB,,, | Performed by: FAMILY MEDICINE

## 2023-09-14 PROCEDURE — 99213 PR OFFICE/OUTPT VISIT, EST, LEVL III, 20-29 MIN: ICD-10-PCS | Mod: S$PBB,,, | Performed by: FAMILY MEDICINE

## 2023-09-14 PROCEDURE — 3074F PR MOST RECENT SYSTOLIC BLOOD PRESSURE < 130 MM HG: ICD-10-PCS | Mod: ,,, | Performed by: FAMILY MEDICINE

## 2023-09-14 RX ORDER — LIRAGLUTIDE 6 MG/ML
INJECTION SUBCUTANEOUS
COMMUNITY
Start: 2023-04-18 | End: 2024-02-05 | Stop reason: SDUPTHER

## 2023-09-14 RX ORDER — PEN NEEDLE, DIABETIC 32 GX 1/4"
NEEDLE, DISPOSABLE MISCELLANEOUS
COMMUNITY
Start: 2023-04-18

## 2023-09-14 RX ORDER — METFORMIN HYDROCHLORIDE 500 MG/1
500 TABLET, EXTENDED RELEASE ORAL
COMMUNITY
Start: 2023-09-05 | End: 2023-11-15

## 2023-09-14 NOTE — PROGRESS NOTES
VEIN CENTER CLINIC NOTE    Patient ID: Maury Mckenzie is a 47 y.o. female.    I. HISTORY     Chief Complaint:   Chief Complaint   Patient presents with    Follow-up     1 year venous       HPI: Maury Mckenzie is a 47 y.o. female who presents today for a 1 year follow-up.  The patient has known chronic venous insufficiency and has undergone previous procedures.  She is 2 years post non thermal ablation of the left distal great saphenous vein.  She continues to have swelling and pain of the left medial leg level of the knee.  She is also complaining of left lateral foot pain along with some numbness and tingling.  She states worse when walking.      Clinical summary :  She underwent a previous CTV on 2021.  This study shows mild-to-moderate nonspecific narrowing of the bilateral deep veins at the common femoral level which is nonspecific.  No evidence of DVT.  Superficial varices of the bilateral thighs.  Fat containing umbilical hernia, prior hysterectomy, colonic diverticulosis and scattered skeletal degenerative changes.    The patient underwent a left complete venous reflux study today and the results were discussed with the patient.  This study shows no evidence of DVT in the left lower extremity.  The left great saphenous vein appears to be ablated from the proximal thigh down to the mid calf.  No other superficial venous reflux noted.  Posterior lateral varicosities noted within the calf and knee.    Left distal GSV remnant ablation 9-10-21    Past Medical History:   Diagnosis Date    Carpal tunnel syndrome     Hyperlipidemia     Hypertension     Hypothyroidism     IBS (irritable bowel syndrome)         Past Surgical History:   Procedure Laterality Date    CARDIAC CATHETERIZATION      Dr. Beatty    CARPAL TUNNEL RELEASE Left     Dr. Byers     SECTION  1996    x 1    HYSTERECTOMY      LASER ABLATION Left 2019    Left GSV Laser Ablation performed by Dr. Juan Yang    LASER ABLATION  Left 09/10/2021    Procedure: Left distal GSV Remnant Laser Ablation;  Surgeon: Ed Yang DO;  Location: [facility];  Service: Vascular;  Laterality: Left;    Left Ilioinguinal NB Left 2019    DR MCPHERSON    OOPHORECTOMY      TOTAL ABDOMINAL HYSTERECTOMY  2012    Dr. Christine       Social History     Tobacco Use   Smoking Status Never   Smokeless Tobacco Never         Current Outpatient Medications:     aspirin (ECOTRIN) 81 MG EC tablet, Take 1 tablet (81 mg total) by mouth once daily., Disp: 90 tablet, Rfl: 1    benzonatate (TESSALON) 100 MG capsule, Take 1 capsule (100 mg total) by mouth 3 (three) times daily as needed for Cough., Disp: 60 capsule, Rfl: 0    celecoxib (CELEBREX) 200 MG capsule, Take 1 capsule (200 mg total) by mouth once daily., Disp: 90 capsule, Rfl: 1    cetirizine (ZYRTEC) 10 MG tablet, Take 1 tablet (10 mg total) by mouth once daily., Disp: 30 tablet, Rfl: 0    cloNIDine (CATAPRES) 0.2 MG tablet, Take 1 tablet (0.2 mg total) by mouth every evening., Disp: 90 tablet, Rfl: 1    colchicine (COLCRYS) 0.6 mg tablet, 2 tablets by mouth on day 1, then daily x1 week, Disp: 8 tablet, Rfl: 0    diclofenac (VOLTAREN) 50 MG EC tablet, Take 1 tablet (50 mg total) by mouth 2 (two) times daily., Disp: 60 tablet, Rfl: 1    ergocalciferol (ERGOCALCIFEROL) 50,000 unit Cap, Take 1 capsule (50,000 Units total) by mouth every 7 days., Disp: 12 capsule, Rfl: 1    fluticasone propionate (FLONASE) 50 mcg/actuation nasal spray, 1 spray (50 mcg total) by Each Nostril route once daily., Disp: 11.1 mL, Rfl: 0    furosemide (LASIX) 20 MG tablet, Take 1 tablet (20 mg total) by mouth once daily., Disp: 90 tablet, Rfl: 1    ibuprofen (ADVIL,MOTRIN) 800 MG tablet, Take 1 tablet (800 mg total) by mouth 3 (three) times daily., Disp: 30 tablet, Rfl: 0    levocetirizine (XYZAL) 5 MG tablet, Take 1 tablet (5 mg total) by mouth every evening., Disp: 30 tablet, Rfl: 1    levothyroxine (SYNTHROID) 25 MCG tablet, Take 1 tablet (25  "mcg total) by mouth before breakfast., Disp: 90 tablet, Rfl: 1    LINZESS 290 mcg Cap capsule, Take 1 capsule (290 mcg total) by mouth once daily., Disp: 90 capsule, Rfl: 1    metFORMIN (GLUCOPHAGE-XR) 500 MG ER 24hr tablet, Take 500 mg by mouth., Disp: , Rfl:     NOVOFINE 32 32 gauge x 1/4" Ndle, SMARTSIG:Syringe(s) SUB-Q, Disp: , Rfl:     olmesartan (BENICAR) 5 MG Tab, Take 1 tablet (5 mg total) by mouth once daily., Disp: 90 tablet, Rfl: 1    pravastatin (PRAVACHOL) 40 MG tablet, Take 1 tablet (40 mg total) by mouth every evening., Disp: 90 tablet, Rfl: 1    tiZANidine (ZANAFLEX) 4 MG tablet, Take 1 tablet (4 mg total) by mouth nightly as needed (muscle spasms)., Disp: 60 tablet, Rfl: 1    VICTOZA 3-CARIE 0.6 mg/0.1 mL (18 mg/3 mL) PnIj pen, Inject into the skin., Disp: , Rfl:     azithromycin (Z-CARIE) 250 MG tablet, Take 2 tablets by mouth on day 1; Take 1 tablet by mouth on days 2-5, Disp: 6 tablet, Rfl: 0    azithromycin (Z-CARIE) 250 MG tablet, Take 2 tablets by mouth on day 1; Take 1 tablet by mouth on days 2-5, Disp: 6 tablet, Rfl: 0    benzonatate (TESSALON) 100 MG capsule, Take 1 capsule (100 mg total) by mouth 3 (three) times daily as needed for Cough., Disp: 60 capsule, Rfl: 1    diclofenac (VOLTAREN) 50 MG EC tablet, Take 1 tablet (50 mg total) by mouth 2 (two) times daily., Disp: 60 tablet, Rfl: 1    diclofenac (VOLTAREN) 50 MG EC tablet, Take 1 tablet (50 mg total) by mouth 2 (two) times daily., Disp: 60 tablet, Rfl: 1    fluticasone propionate (FLONASE) 50 mcg/actuation nasal spray, 1 spray (50 mcg total) by Each Nostril route once daily., Disp: 11.1 mL, Rfl: 0    methylPREDNISolone (MEDROL DOSEPACK) 4 mg tablet, use as directed, Disp: 21 tablet, Rfl: 0    methylPREDNISolone (MEDROL DOSEPACK) 4 mg tablet, use as directed, Disp: 21 tablet, Rfl: 0    methylPREDNISolone (MEDROL DOSEPACK) 4 mg tablet, use as directed, Disp: 21 tablet, Rfl: 0    methylPREDNISolone (MEDROL DOSEPACK) 4 mg tablet, use as " directed, Disp: 21 tablet, Rfl: 0    methylPREDNISolone (MEDROL DOSEPACK) 4 mg tablet, use as directed, Disp: 21 tablet, Rfl: 0    Review of Systems   Constitutional:  Negative for fatigue and fever.   Eyes:  Negative for pain.   Respiratory:  Negative for chest tightness and shortness of breath.    Cardiovascular:  Positive for leg swelling. Negative for chest pain.   Gastrointestinal:  Negative for abdominal pain.   Musculoskeletal:  Positive for leg pain.        Chronic darker skin changes bilaterally   Neurological:  Negative for syncope.   Psychiatric/Behavioral:  Negative for sleep disturbance.           II. PHYSICAL EXAM     Physical Exam  Vitals reviewed.   Constitutional:       General: She is not in acute distress.     Appearance: She is obese.   HENT:      Head: Normocephalic.   Eyes:      Pupils: Pupils are equal, round, and reactive to light.   Cardiovascular:      Rate and Rhythm: Normal rate and regular rhythm.   Pulmonary:      Effort: Pulmonary effort is normal.   Abdominal:      Palpations: Abdomen is soft.   Musculoskeletal:         General: No swelling. Normal range of motion.      Right lower leg: No edema.      Left lower leg: Edema present.      Comments: Tenderness to palpation along the left medial joint line and the tissues surrounding.  Crepitus with flexion-extension under patella.    Pain to the lateral aspect of the left foot with inversion.  No noted deformity or swelling.   Skin:     General: Skin is warm and dry.   Neurological:      Mental Status: She is alert and oriented to person, place, and time.       III. ASSESSMENT & PLAN (MEDICAL DECISION MAKING)     1. Pain of left lower extremity    2. Pain and swelling of left knee    3. Pain in left foot    4. Venous insufficiency    5. Edema, lower extremity          Assessment/Diagnosis and Plan:  - Recommend continued compression with 20-30mmHg Rx stockings  - Therapeutic leg elevation  - Calf pumping exercises  - refer to orthopedics  for left knee and foot pain.  - RTC in 1 year.    Ed Yang, DO

## 2023-09-20 ENCOUNTER — HOSPITAL ENCOUNTER (OUTPATIENT)
Dept: RADIOLOGY | Facility: HOSPITAL | Age: 48
Discharge: HOME | End: 2023-09-20
Attending: NURSE PRACTITIONER
Payer: MEDICAID

## 2023-09-20 VITALS — HEIGHT: 65 IN | WEIGHT: 236 LBS | BODY MASS INDEX: 39.32 KG/M2

## 2023-09-20 DIAGNOSIS — Z12.31 OTHER SCREENING MAMMOGRAM: ICD-10-CM

## 2023-09-20 PROCEDURE — 77067 SCR MAMMO BI INCL CAD: CPT | Mod: TC

## 2023-09-26 ENCOUNTER — HOSPITAL ENCOUNTER (OUTPATIENT)
Dept: RADIOLOGY | Facility: HOSPITAL | Age: 48
Discharge: HOME | End: 2023-09-26
Attending: NURSE PRACTITIONER
Payer: MEDICAID

## 2023-09-26 DIAGNOSIS — E03.9 HYPOTHYROIDISM, UNSPECIFIED TYPE: ICD-10-CM

## 2023-09-26 PROCEDURE — 76536 US EXAM OF HEAD AND NECK: CPT | Mod: 26,,, | Performed by: RADIOLOGY

## 2023-09-26 PROCEDURE — 76536 US EXAM OF HEAD AND NECK: CPT | Mod: TC

## 2023-09-26 PROCEDURE — 76536 US THYROID: ICD-10-PCS | Mod: 26,,, | Performed by: RADIOLOGY

## 2023-11-14 DIAGNOSIS — M25.562 ACUTE PAIN OF LEFT KNEE: ICD-10-CM

## 2023-11-14 DIAGNOSIS — M25.572 ACUTE LEFT ANKLE PAIN: Primary | ICD-10-CM

## 2023-11-15 ENCOUNTER — OFFICE VISIT (OUTPATIENT)
Dept: ORTHOPEDICS | Facility: CLINIC | Age: 48
End: 2023-11-15
Payer: MEDICAID

## 2023-11-15 ENCOUNTER — HOSPITAL ENCOUNTER (OUTPATIENT)
Dept: RADIOLOGY | Facility: HOSPITAL | Age: 48
Discharge: HOME | End: 2023-11-15
Attending: NURSE PRACTITIONER
Payer: MEDICAID

## 2023-11-15 VITALS
HEART RATE: 78 BPM | RESPIRATION RATE: 16 BRPM | WEIGHT: 236 LBS | BODY MASS INDEX: 39.32 KG/M2 | OXYGEN SATURATION: 97 % | HEIGHT: 65 IN | TEMPERATURE: 98 F

## 2023-11-15 DIAGNOSIS — M17.12 PRIMARY OSTEOARTHRITIS OF LEFT KNEE: Primary | ICD-10-CM

## 2023-11-15 DIAGNOSIS — M25.572 ACUTE LEFT ANKLE PAIN: ICD-10-CM

## 2023-11-15 DIAGNOSIS — M25.462 PAIN AND SWELLING OF LEFT KNEE: ICD-10-CM

## 2023-11-15 DIAGNOSIS — M25.562 PAIN AND SWELLING OF LEFT KNEE: ICD-10-CM

## 2023-11-15 DIAGNOSIS — M25.562 ACUTE PAIN OF LEFT KNEE: ICD-10-CM

## 2023-11-15 DIAGNOSIS — M79.605 PAIN OF LEFT LOWER EXTREMITY: ICD-10-CM

## 2023-11-15 DIAGNOSIS — M79.672 PAIN IN LEFT FOOT: ICD-10-CM

## 2023-11-15 PROCEDURE — 3008F BODY MASS INDEX DOCD: CPT | Mod: ,,, | Performed by: NURSE PRACTITIONER

## 2023-11-15 PROCEDURE — 99203 OFFICE O/P NEW LOW 30 MIN: CPT | Mod: S$PBB,,, | Performed by: NURSE PRACTITIONER

## 2023-11-15 PROCEDURE — 73562 X-RAY EXAM OF KNEE 3: CPT | Mod: TC,LT

## 2023-11-15 PROCEDURE — 99215 OFFICE O/P EST HI 40 MIN: CPT | Mod: PBBFAC | Performed by: NURSE PRACTITIONER

## 2023-11-15 PROCEDURE — 73562 XR KNEE AP LAT WITH SUNRISE LEFT: ICD-10-PCS | Mod: 26,LT,, | Performed by: RADIOLOGY

## 2023-11-15 PROCEDURE — 73610 X-RAY EXAM OF ANKLE: CPT | Mod: 26,LT,, | Performed by: STUDENT IN AN ORGANIZED HEALTH CARE EDUCATION/TRAINING PROGRAM

## 2023-11-15 PROCEDURE — 73610 X-RAY EXAM OF ANKLE: CPT | Mod: TC,LT

## 2023-11-15 PROCEDURE — 3008F PR BODY MASS INDEX (BMI) DOCUMENTED: ICD-10-PCS | Mod: ,,, | Performed by: NURSE PRACTITIONER

## 2023-11-15 PROCEDURE — 73610 XR ANKLE COMPLETE 3 VIEW LEFT: ICD-10-PCS | Mod: 26,LT,, | Performed by: STUDENT IN AN ORGANIZED HEALTH CARE EDUCATION/TRAINING PROGRAM

## 2023-11-15 PROCEDURE — 73562 X-RAY EXAM OF KNEE 3: CPT | Mod: 26,LT,, | Performed by: RADIOLOGY

## 2023-11-15 PROCEDURE — 99203 PR OFFICE/OUTPT VISIT, NEW, LEVL III, 30-44 MIN: ICD-10-PCS | Mod: S$PBB,,, | Performed by: NURSE PRACTITIONER

## 2023-11-15 RX ORDER — DICLOFENAC SODIUM 50 MG/1
50 TABLET, DELAYED RELEASE ORAL 2 TIMES DAILY
Qty: 60 TABLET | Refills: 1 | Status: SHIPPED | OUTPATIENT
Start: 2023-11-15 | End: 2023-11-15 | Stop reason: CLARIF

## 2023-11-15 NOTE — PATIENT INSTRUCTIONS
Safety guidelines and activity restrictions are discussed with the patient.  Informed the patient that I do not observe any swelling in her extremity.  Did discuss whether the knee we will not make the whole legs well.  Discussed treatment of DJD to include oral NSAIDs such as she was taking per label directions.  We discussed topical NSAID medication over-the-counter such as diclofenac.  We discussed intra-articular steroid injections of the fact that can be 1 every 4 months as needed if she receives benefit.  She declines.  Discussed physical therapy.  She was there is in agreement.  We will set her up physical therapy for range of motion strengthening exercises of her left knee 1 to 3 times a week x6 weeks.  Also discussed some point in the future of the possibility of total knee replacement arthroplasty.  We will refer her back to the pain treatment Center per her request.  If she fails to improve with therapy she is to call back for scheduling.

## 2023-11-15 NOTE — PROGRESS NOTES
47-year-old female presents ambulatory orthopedic clinic complaint of a 2+ year history of pain and swelling in her left knee.  She is unable to identify any specific spot of her knee that is painful.  She states it is primarily painful when she stands.  She was known to the vein clinic.  States she is had surgery on her leg in the past.    X-ray: X-rays today left knee AP, lateral and sunrise view shows mild DJD changes.  No evidence of acute fracture, dislocation or pathologic bone noted.      PE:  In general the patient is awake, alert oriented appropriately.  No acute distress noted.  Respirations are even unlabored.  Skin is warm dry and intact.  She is noted be ambulating independently with no perceptible limp.  Physical exam left knee skin is warm dry and intact.  No soft tissue swelling is noted.  No intra-articular effusion appreciated clinically.  Range of motion of her left knee 0° extension with further flexion to approximately 95°.  Valgus stress test is negative.  Varus stress test is negative.  Posterior drawer test is negative.  Anterior drawer test is negative.  No tenderness palpation quad tendon.  No tenderness palpation patella tendon.  There is exquisite tenderness to palpation both mediolateral joint line.  There is no obvious sign or symptom of infection.      Impression:  DJD knee-left     Plan:  Safety guidelines and activity restrictions are discussed with the patient.  Informed the patient that I do not observe any swelling in her extremity.  Did discuss whether the knee we will not make the whole legs well.  Discussed treatment of DJD to include oral NSAIDs such as she was taking per label directions.  We discussed topical NSAID medication over-the-counter such as diclofenac.  We discussed intra-articular steroid injections of the fact that can be 1 every 4 months as needed if she receives benefit.  She declines.  Discussed physical therapy.  She was there is in agreement.  We will set  her up physical therapy for range of motion strengthening exercises of her left knee 1 to 3 times a week x6 weeks.  Also discussed some point in the future of the possibility of total knee replacement arthroplasty.  We will refer her back to the pain treatment Center per her request.  If she fails to improve with therapy she is to call back for scheduling.

## 2023-11-28 ENCOUNTER — OFFICE VISIT (OUTPATIENT)
Dept: VASCULAR SURGERY | Facility: CLINIC | Age: 48
End: 2023-11-28
Payer: MEDICAID

## 2023-11-28 VITALS
HEIGHT: 70 IN | WEIGHT: 243 LBS | BODY MASS INDEX: 34.79 KG/M2 | SYSTOLIC BLOOD PRESSURE: 140 MMHG | DIASTOLIC BLOOD PRESSURE: 90 MMHG | RESPIRATION RATE: 20 BRPM | HEART RATE: 80 BPM

## 2023-11-28 DIAGNOSIS — R60.0 EDEMA, LOWER EXTREMITY: ICD-10-CM

## 2023-11-28 DIAGNOSIS — M17.12 PRIMARY OSTEOARTHRITIS OF LEFT KNEE: ICD-10-CM

## 2023-11-28 DIAGNOSIS — I87.2 VENOUS INSUFFICIENCY (CHRONIC) (PERIPHERAL): ICD-10-CM

## 2023-11-28 DIAGNOSIS — M79.672 PAIN IN LEFT FOOT: Primary | ICD-10-CM

## 2023-11-28 PROCEDURE — 99213 PR OFFICE/OUTPT VISIT, EST, LEVL III, 20-29 MIN: ICD-10-PCS | Mod: S$PBB,,, | Performed by: FAMILY MEDICINE

## 2023-11-28 PROCEDURE — 3077F PR MOST RECENT SYSTOLIC BLOOD PRESSURE >= 140 MM HG: ICD-10-PCS | Mod: ,,, | Performed by: FAMILY MEDICINE

## 2023-11-28 PROCEDURE — 3077F SYST BP >= 140 MM HG: CPT | Mod: ,,, | Performed by: FAMILY MEDICINE

## 2023-11-28 PROCEDURE — 99215 OFFICE O/P EST HI 40 MIN: CPT | Mod: PBBFAC | Performed by: FAMILY MEDICINE

## 2023-11-28 PROCEDURE — 99213 OFFICE O/P EST LOW 20 MIN: CPT | Mod: S$PBB,,, | Performed by: FAMILY MEDICINE

## 2023-11-28 PROCEDURE — 3008F BODY MASS INDEX DOCD: CPT | Mod: ,,, | Performed by: FAMILY MEDICINE

## 2023-11-28 PROCEDURE — 3008F PR BODY MASS INDEX (BMI) DOCUMENTED: ICD-10-PCS | Mod: ,,, | Performed by: FAMILY MEDICINE

## 2023-11-28 RX ORDER — DICLOFENAC SODIUM 50 MG/1
TABLET, DELAYED RELEASE ORAL
COMMUNITY
Start: 2023-11-15

## 2023-11-28 NOTE — PROGRESS NOTES
VEIN CENTER CLINIC NOTE    Patient ID: Maury Mckenzie is a 47 y.o. female.    I. HISTORY     Chief Complaint:   Chief Complaint   Patient presents with    Leg Swelling     Room 3. Leg pain/ swelling LFT       HPI: Maury Mckenzie is a 47 y.o. female who presents today with complaints of left foot pain and cramping over the past several days.  She states that upon awakening and getting out of bed she is had cramps in the plantar surface of her left foot.  Denies symptoms in her right foot or lower extremity.  She denies any swelling, weeping or signs of cellulitis.  On exam she is nontender over the calcaneus plantar surface of her foot.  It does not appear to be plantar fasciitis.  Some good arch supports in her shoes may benefit her.  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    Clinical summary :  She underwent a previous CTV on 2021.  This study shows mild-to-moderate nonspecific narrowing of the bilateral deep veins at the common femoral level which is nonspecific.  No evidence of DVT.  Superficial varices of the bilateral thighs.  Fat containing umbilical hernia, prior hysterectomy, colonic diverticulosis and scattered skeletal degenerative changes.    The patient underwent a left complete venous reflux study today and the results were discussed with the patient.  This study shows no evidence of DVT in the left lower extremity.  The left great saphenous vein appears to be ablated from the proximal thigh down to the mid calf.  No other superficial venous reflux noted.  Posterior lateral varicosities noted within the calf and knee.    Left distal GSV remnant ablation 9-10-21    Past Medical History:   Diagnosis Date    Carpal tunnel syndrome     Hyperlipidemia     Hypertension     Hypothyroidism     IBS (irritable bowel syndrome)         Past Surgical History:   Procedure Laterality Date    CARDIAC CATHETERIZATION      Dr. Beatty    CARPAL TUNNEL RELEASE Left     Dr. Byers      "SECTION  1996    x 1    HYSTERECTOMY      LASER ABLATION Left 12/06/2019    Left GSV Laser Ablation performed by Dr. Juan Yang    LASER ABLATION Left 09/10/2021    Procedure: Left distal GSV Remnant Laser Ablation;  Surgeon: Ed Yang DO;  Location: [facility];  Service: Vascular;  Laterality: Left;    Left Ilioinguinal NB Left 2019    DR MCPHERSON    OOPHORECTOMY      TOTAL ABDOMINAL HYSTERECTOMY  2012    Dr. Christine       Social History     Tobacco Use   Smoking Status Never   Smokeless Tobacco Never         Current Outpatient Medications:     aspirin (ECOTRIN) 81 MG EC tablet, Take 1 tablet (81 mg total) by mouth once daily., Disp: 90 tablet, Rfl: 1    celecoxib (CELEBREX) 200 MG capsule, Take 1 capsule (200 mg total) by mouth once daily., Disp: 90 capsule, Rfl: 1    cloNIDine (CATAPRES) 0.2 MG tablet, Take 1 tablet (0.2 mg total) by mouth every evening., Disp: 90 tablet, Rfl: 1    colchicine (COLCRYS) 0.6 mg tablet, 2 tablets by mouth on day 1, then daily x1 week, Disp: 8 tablet, Rfl: 0    diclofenac (VOLTAREN) 50 MG EC tablet, , Disp: , Rfl:     ergocalciferol (ERGOCALCIFEROL) 50,000 unit Cap, Take 1 capsule (50,000 Units total) by mouth every 7 days., Disp: 12 capsule, Rfl: 1    fluticasone propionate (FLONASE) 50 mcg/actuation nasal spray, 1 spray (50 mcg total) by Each Nostril route once daily., Disp: 11.1 mL, Rfl: 0    furosemide (LASIX) 20 MG tablet, Take 1 tablet (20 mg total) by mouth once daily., Disp: 90 tablet, Rfl: 1    levocetirizine (XYZAL) 5 MG tablet, Take 1 tablet (5 mg total) by mouth every evening., Disp: 30 tablet, Rfl: 1    levothyroxine (SYNTHROID) 25 MCG tablet, Take 1 tablet (25 mcg total) by mouth before breakfast., Disp: 90 tablet, Rfl: 1    LINZESS 290 mcg Cap capsule, Take 1 capsule (290 mcg total) by mouth once daily., Disp: 90 capsule, Rfl: 1    NOVOFINE 32 32 gauge x 1/4" Ndle, SMARTSIG:Syringe(s) SUB-Q, Disp: , Rfl:     olmesartan (BENICAR) 5 MG Tab, Take 1 tablet (5 mg " total) by mouth once daily., Disp: 90 tablet, Rfl: 1    pravastatin (PRAVACHOL) 40 MG tablet, Take 1 tablet (40 mg total) by mouth every evening., Disp: 90 tablet, Rfl: 1    tiZANidine (ZANAFLEX) 4 MG tablet, Take 1 tablet (4 mg total) by mouth nightly as needed (muscle spasms)., Disp: 60 tablet, Rfl: 1    VICTOZA 3-CARIE 0.6 mg/0.1 mL (18 mg/3 mL) PnIj pen, Inject into the skin., Disp: , Rfl:     Review of Systems   Constitutional:  Negative for fatigue and fever.   Eyes:  Negative for pain.   Respiratory:  Negative for chest tightness and shortness of breath.    Cardiovascular:  Positive for leg swelling. Negative for chest pain.   Gastrointestinal:  Negative for abdominal pain.   Musculoskeletal:  Positive for leg pain.        Chronic darker skin changes bilaterally   Neurological:  Negative for syncope.   Psychiatric/Behavioral:  Negative for sleep disturbance.           II. PHYSICAL EXAM     Physical Exam  Vitals reviewed.   Constitutional:       General: She is not in acute distress.     Appearance: She is obese.   HENT:      Head: Normocephalic.   Eyes:      Pupils: Pupils are equal, round, and reactive to light.   Cardiovascular:      Rate and Rhythm: Normal rate and regular rhythm.   Pulmonary:      Effort: Pulmonary effort is normal.   Abdominal:      Palpations: Abdomen is soft.   Musculoskeletal:         General: No swelling. Normal range of motion.      Right lower leg: No edema.      Left lower leg: Edema present.      Comments: Tenderness to palpation along the left medial joint line and the tissues surrounding.  Crepitus with flexion-extension under patella.    Pain to the lateral aspect of the left foot with inversion.  No noted deformity or swelling.   Skin:     General: Skin is warm and dry.   Neurological:      Mental Status: She is alert and oriented to person, place, and time.       III. ASSESSMENT & PLAN (MEDICAL DECISION MAKING)     1. Pain in left foot    2. Primary osteoarthritis of left knee     3. Venous insufficiency (chronic) (peripheral)    4. Edema, lower extremity          Assessment/Diagnosis and Plan:  - Recommend continued compression with 20-30mmHg Rx stockings  - Therapeutic leg elevation  - Calf pumping exercises  - advised patient to try over-the-counter arch supports.  If not beneficial, we can refer to podiatry.  - RTC in 1 year.    Ed Yang, DO

## 2024-02-05 ENCOUNTER — OFFICE VISIT (OUTPATIENT)
Dept: FAMILY MEDICINE | Facility: CLINIC | Age: 49
End: 2024-02-05
Payer: MEDICAID

## 2024-02-05 VITALS
SYSTOLIC BLOOD PRESSURE: 122 MMHG | TEMPERATURE: 99 F | HEIGHT: 65 IN | HEART RATE: 86 BPM | WEIGHT: 247.63 LBS | OXYGEN SATURATION: 100 % | BODY MASS INDEX: 41.26 KG/M2 | RESPIRATION RATE: 18 BRPM | DIASTOLIC BLOOD PRESSURE: 66 MMHG

## 2024-02-05 DIAGNOSIS — E78.5 HYPERLIPIDEMIA, UNSPECIFIED HYPERLIPIDEMIA TYPE: ICD-10-CM

## 2024-02-05 DIAGNOSIS — I87.2 VENOUS INSUFFICIENCY: ICD-10-CM

## 2024-02-05 DIAGNOSIS — Z79.899 ON LONG TERM DRUG THERAPY: ICD-10-CM

## 2024-02-05 DIAGNOSIS — R73.9 HYPERGLYCEMIA: ICD-10-CM

## 2024-02-05 DIAGNOSIS — E03.9 HYPOTHYROIDISM, UNSPECIFIED TYPE: ICD-10-CM

## 2024-02-05 DIAGNOSIS — I10 HYPERTENSION, UNSPECIFIED TYPE: Primary | ICD-10-CM

## 2024-02-05 DIAGNOSIS — J01.00 ACUTE NON-RECURRENT MAXILLARY SINUSITIS: ICD-10-CM

## 2024-02-05 LAB
ALBUMIN SERPL BCP-MCNC: 3.5 G/DL (ref 3.5–5)
ALBUMIN/GLOB SERPL: 0.9 {RATIO}
ALP SERPL-CCNC: 49 U/L (ref 41–108)
ALT SERPL W P-5'-P-CCNC: 23 U/L (ref 13–56)
ANION GAP SERPL CALCULATED.3IONS-SCNC: 3 MMOL/L (ref 7–16)
AST SERPL W P-5'-P-CCNC: 19 U/L (ref 15–37)
BASOPHILS # BLD AUTO: 0.06 K/UL (ref 0–0.2)
BASOPHILS NFR BLD AUTO: 1.1 % (ref 0–1)
BILIRUB SERPL-MCNC: 0.5 MG/DL (ref ?–1.2)
BUN SERPL-MCNC: 11 MG/DL (ref 7–18)
BUN/CREAT SERPL: 11 (ref 6–20)
CALCIUM SERPL-MCNC: 9.3 MG/DL (ref 8.5–10.1)
CHLORIDE SERPL-SCNC: 109 MMOL/L (ref 98–107)
CHOLEST SERPL-MCNC: 195 MG/DL (ref 0–200)
CHOLEST/HDLC SERPL: 3.2 {RATIO}
CO2 SERPL-SCNC: 32 MMOL/L (ref 21–32)
CREAT SERPL-MCNC: 0.97 MG/DL (ref 0.55–1.02)
DIFFERENTIAL METHOD BLD: ABNORMAL
EGFR (NO RACE VARIABLE) (RUSH/TITUS): 71 ML/MIN/1.73M2
EOSINOPHIL # BLD AUTO: 0.32 K/UL (ref 0–0.5)
EOSINOPHIL NFR BLD AUTO: 6.1 % (ref 1–4)
ERYTHROCYTE [DISTWIDTH] IN BLOOD BY AUTOMATED COUNT: 14.4 % (ref 11.5–14.5)
GLOBULIN SER-MCNC: 3.8 G/DL (ref 2–4)
GLUCOSE SERPL-MCNC: 112 MG/DL (ref 74–106)
HCT VFR BLD AUTO: 39.8 % (ref 38–47)
HDLC SERPL-MCNC: 61 MG/DL (ref 40–60)
HGB BLD-MCNC: 12.6 G/DL (ref 12–16)
IMM GRANULOCYTES # BLD AUTO: 0.01 K/UL (ref 0–0.04)
IMM GRANULOCYTES NFR BLD: 0.2 % (ref 0–0.4)
LDLC SERPL CALC-MCNC: 119 MG/DL
LDLC/HDLC SERPL: 2 {RATIO}
LYMPHOCYTES # BLD AUTO: 1.8 K/UL (ref 1–4.8)
LYMPHOCYTES NFR BLD AUTO: 34.5 % (ref 27–41)
MCH RBC QN AUTO: 29 PG (ref 27–31)
MCHC RBC AUTO-ENTMCNC: 31.7 G/DL (ref 32–36)
MCV RBC AUTO: 91.5 FL (ref 80–96)
MONOCYTES # BLD AUTO: 0.35 K/UL (ref 0–0.8)
MONOCYTES NFR BLD AUTO: 6.7 % (ref 2–6)
MPC BLD CALC-MCNC: 11.4 FL (ref 9.4–12.4)
NEUTROPHILS # BLD AUTO: 2.68 K/UL (ref 1.8–7.7)
NEUTROPHILS NFR BLD AUTO: 51.4 % (ref 53–65)
NONHDLC SERPL-MCNC: 134 MG/DL
NRBC # BLD AUTO: 0 X10E3/UL
NRBC, AUTO (.00): 0 %
PLATELET # BLD AUTO: 232 K/UL (ref 150–400)
POTASSIUM SERPL-SCNC: 4.2 MMOL/L (ref 3.5–5.1)
PROT SERPL-MCNC: 7.3 G/DL (ref 6.4–8.2)
PTH-INTACT SERPL-MCNC: 143.1 PG/ML (ref 18.4–80.1)
RBC # BLD AUTO: 4.35 M/UL (ref 4.2–5.4)
SODIUM SERPL-SCNC: 140 MMOL/L (ref 136–145)
T3FREE SERPL-MCNC: 2.41 PG/ML (ref 2.18–3.98)
T4 FREE SERPL-MCNC: 0.96 NG/DL (ref 0.76–1.46)
THYROPEROXIDASE AB SERPL-ACNC: 297 U/ML (ref 0–60)
TRIGL SERPL-MCNC: 76 MG/DL (ref 35–150)
TSH SERPL DL<=0.005 MIU/L-ACNC: 3.53 UIU/ML (ref 0.36–3.74)
VLDLC SERPL-MCNC: 15 MG/DL
WBC # BLD AUTO: 5.22 K/UL (ref 4.5–11)

## 2024-02-05 PROCEDURE — 83036 HEMOGLOBIN GLYCOSYLATED A1C: CPT | Mod: ,,, | Performed by: CLINICAL MEDICAL LABORATORY

## 2024-02-05 PROCEDURE — 84439 ASSAY OF FREE THYROXINE: CPT | Mod: ,,, | Performed by: CLINICAL MEDICAL LABORATORY

## 2024-02-05 PROCEDURE — 83970 ASSAY OF PARATHORMONE: CPT | Mod: ,,, | Performed by: CLINICAL MEDICAL LABORATORY

## 2024-02-05 PROCEDURE — 84481 FREE ASSAY (FT-3): CPT | Mod: ,,, | Performed by: CLINICAL MEDICAL LABORATORY

## 2024-02-05 PROCEDURE — 3074F SYST BP LT 130 MM HG: CPT | Mod: ,,, | Performed by: NURSE PRACTITIONER

## 2024-02-05 PROCEDURE — 99214 OFFICE O/P EST MOD 30 MIN: CPT | Mod: 25,,, | Performed by: NURSE PRACTITIONER

## 2024-02-05 PROCEDURE — 3008F BODY MASS INDEX DOCD: CPT | Mod: ,,, | Performed by: NURSE PRACTITIONER

## 2024-02-05 PROCEDURE — 80061 LIPID PANEL: CPT | Mod: ,,, | Performed by: CLINICAL MEDICAL LABORATORY

## 2024-02-05 PROCEDURE — 96372 THER/PROPH/DIAG INJ SC/IM: CPT | Mod: ,,, | Performed by: NURSE PRACTITIONER

## 2024-02-05 PROCEDURE — 80050 GENERAL HEALTH PANEL: CPT | Mod: ,,, | Performed by: CLINICAL MEDICAL LABORATORY

## 2024-02-05 PROCEDURE — 86376 MICROSOMAL ANTIBODY EACH: CPT | Mod: ,,, | Performed by: CLINICAL MEDICAL LABORATORY

## 2024-02-05 PROCEDURE — 3078F DIAST BP <80 MM HG: CPT | Mod: ,,, | Performed by: NURSE PRACTITIONER

## 2024-02-05 RX ORDER — BETAMETHASONE SODIUM PHOSPHATE AND BETAMETHASONE ACETATE 3; 3 MG/ML; MG/ML
6 INJECTION, SUSPENSION INTRA-ARTICULAR; INTRALESIONAL; INTRAMUSCULAR; SOFT TISSUE
Status: COMPLETED | OUTPATIENT
Start: 2024-02-05 | End: 2024-02-05

## 2024-02-05 RX ORDER — DOXYCYCLINE 100 MG/1
100 CAPSULE ORAL 2 TIMES DAILY
Qty: 14 CAPSULE | Refills: 0 | Status: SHIPPED | OUTPATIENT
Start: 2024-02-05 | End: 2024-02-12

## 2024-02-05 RX ORDER — PRAVASTATIN SODIUM 40 MG/1
40 TABLET ORAL NIGHTLY
Qty: 90 TABLET | Refills: 1 | Status: SHIPPED | OUTPATIENT
Start: 2024-02-05

## 2024-02-05 RX ORDER — OLMESARTAN MEDOXOMIL 5 MG/1
5 TABLET ORAL DAILY
Qty: 90 TABLET | Refills: 1 | Status: SHIPPED | OUTPATIENT
Start: 2024-02-05

## 2024-02-05 RX ORDER — LIRAGLUTIDE 6 MG/ML
0.6 INJECTION SUBCUTANEOUS DAILY
Qty: 4 PEN | Refills: 5 | Status: SHIPPED | OUTPATIENT
Start: 2024-02-05

## 2024-02-05 RX ORDER — DICLOFENAC POTASSIUM 50 MG/1
50 TABLET, FILM COATED ORAL 2 TIMES DAILY
Qty: 60 TABLET | Refills: 5 | Status: SHIPPED | OUTPATIENT
Start: 2024-02-05

## 2024-02-05 RX ORDER — FUROSEMIDE 20 MG/1
20 TABLET ORAL DAILY
Qty: 90 TABLET | Refills: 1 | Status: SHIPPED | OUTPATIENT
Start: 2024-02-05

## 2024-02-05 RX ORDER — CLONIDINE HYDROCHLORIDE 0.2 MG/1
0.2 TABLET ORAL NIGHTLY
Qty: 90 TABLET | Refills: 1 | Status: SHIPPED | OUTPATIENT
Start: 2024-02-05

## 2024-02-05 RX ORDER — CEFTRIAXONE 1 G/1
1 INJECTION, POWDER, FOR SOLUTION INTRAMUSCULAR; INTRAVENOUS
Status: DISCONTINUED | OUTPATIENT
Start: 2024-02-05 | End: 2024-02-05

## 2024-02-05 RX ORDER — LEVOTHYROXINE SODIUM 25 UG/1
25 TABLET ORAL
Qty: 90 TABLET | Refills: 1 | Status: SHIPPED | OUTPATIENT
Start: 2024-02-05

## 2024-02-05 RX ORDER — CEFTRIAXONE 1 G/1
1 INJECTION, POWDER, FOR SOLUTION INTRAMUSCULAR; INTRAVENOUS
Status: COMPLETED | OUTPATIENT
Start: 2024-02-05 | End: 2024-02-05

## 2024-02-05 RX ADMIN — CEFTRIAXONE 1 G: 1 INJECTION, POWDER, FOR SOLUTION INTRAMUSCULAR; INTRAVENOUS at 11:02

## 2024-02-05 RX ADMIN — BETAMETHASONE SODIUM PHOSPHATE AND BETAMETHASONE ACETATE 6 MG: 3; 3 INJECTION, SUSPENSION INTRA-ARTICULAR; INTRALESIONAL; INTRAMUSCULAR; SOFT TISSUE at 11:02

## 2024-02-05 NOTE — PROGRESS NOTES
Alvina Shi DNP   [address] [place] [address]     PATIENT NAME: Maury Mckenzie  : 1975  DATE: 24  MRN: [id number]      Billing Provider: Alvina Shi DNP  Level of Service:   Patient PCP Information       Provider PCP Type    Alvina Shi DNP General            Reason for Visit / Chief Complaint: Follow-up       Update PCP  Update Chief Complaint         History of Present Illness / Problem Focused Workflow     Maury Mckenzie presents to the clinic with Follow-up     HPI    Review of Systems     Review of Systems     Medical / Social / Family History     Past Medical History:   Diagnosis Date    Carpal tunnel syndrome     Hyperlipidemia     Hypertension     Hypothyroidism     IBS (irritable bowel syndrome)        Past Surgical History:   Procedure Laterality Date    CARDIAC CATHETERIZATION      Dr. Beatty    CARPAL TUNNEL RELEASE Left     Dr. Byers     SECTION  1996    x 1    HYSTERECTOMY      LASER ABLATION Left 2019    Left GSV Laser Ablation performed by Dr. Juan Yang    LASER ABLATION Left 09/10/2021    Procedure: Left distal GSV Remnant Laser Ablation;  Surgeon: Ed Yang DO;  Location: [facility] [place];  Service: Vascular;  Laterality: Left;    Left Ilioinguinal NB Left     DR MCPHERSON    OOPHORECTOMY      TOTAL ABDOMINAL HYSTERECTOMY      Dr. Christine       Social History  Ms.  reports that she has never smoked. She has never used smokeless tobacco. She reports that she does not currently use alcohol. She reports that she does not use drugs.    Family History  Ms.'s family history includes Hypertension in her mother; No Known Problems in her brother, father, and sister.    Medications and Allergies     Medications  Outpatient Medications Marked as Taking for the 24 encounter (Office Visit) with Alvina Shi DNP   Medication Sig Dispense Refill    aspirin (ECOTRIN) 81 MG EC tablet Take 1 tablet (81 mg  "total) by mouth once daily. 90 tablet 1    celecoxib (CELEBREX) 200 MG capsule Take 1 capsule (200 mg total) by mouth once daily. 90 capsule 1    cloNIDine (CATAPRES) 0.2 MG tablet Take 1 tablet (0.2 mg total) by mouth every evening. 90 tablet 1    ergocalciferol (ERGOCALCIFEROL) 50,000 unit Cap Take 1 capsule (50,000 Units total) by mouth every 7 days. 12 capsule 1    furosemide (LASIX) 20 MG tablet Take 1 tablet (20 mg total) by mouth once daily. 90 tablet 1    levocetirizine (XYZAL) 5 MG tablet Take 1 tablet (5 mg total) by mouth every evening. 30 tablet 1    levothyroxine (SYNTHROID) 25 MCG tablet Take 1 tablet (25 mcg total) by mouth before breakfast. 90 tablet 1    LINZESS 290 mcg Cap capsule Take 1 capsule (290 mcg total) by mouth once daily. 90 capsule 1    olmesartan (BENICAR) 5 MG Tab Take 1 tablet (5 mg total) by mouth once daily. 90 tablet 1    pravastatin (PRAVACHOL) 40 MG tablet Take 1 tablet (40 mg total) by mouth every evening. 90 tablet 1    tiZANidine (ZANAFLEX) 4 MG tablet Take 1 tablet (4 mg total) by mouth nightly as needed (muscle spasms). 60 tablet 1    VICTOZA 3-CARIE 0.6 mg/0.1 mL (18 mg/3 mL) PnIj pen Inject into the skin.         Allergies  Review of patient's allergies indicates:  No Known Allergies    Physical Examination   /66 (BP Location: Right arm, Patient Position: Sitting, BP Method: X-Large (Automatic))   Pulse 86   Temp 98.5 °F (36.9 °C) (Oral)   Resp 18   Ht 5' 5" (1.651 m)   Wt 112.3 kg (247 lb 9.6 oz)   SpO2 100%   BMI 41.20 kg/m²    Physical Exam  Vitals and nursing note reviewed.   Constitutional:       Appearance: Normal appearance. She is obese.   HENT:      Head: Normocephalic.      Ears:      Comments: Dull TM     Nose: Congestion and rhinorrhea present.      Mouth/Throat:      Mouth: Mucous membranes are moist.   Eyes:      Extraocular Movements: Extraocular movements intact.      Conjunctiva/sclera: Conjunctivae normal.      Pupils: Pupils are " equal, round, and reactive to light.   Cardiovascular:      Rate and Rhythm: Normal rate and regular rhythm.      Pulses: Normal pulses.      Heart sounds: Normal heart sounds.   Pulmonary:      Effort: Pulmonary effort is normal.      Breath sounds: Normal breath sounds.   Abdominal:      General: Abdomen is flat. Bowel sounds are normal.      Palpations: Abdomen is soft.   Musculoskeletal:         General: Normal range of motion.      Cervical back: Normal range of motion and neck supple.      Comments: Varicose LLE    Lymphadenopathy:      Cervical: Cervical adenopathy present.   Skin:     General: Skin is warm and dry.      Capillary Refill: Capillary refill takes less than 2 seconds.   Neurological:      General: No focal deficit present.      Mental Status: She is alert and oriented to person, place, and time. Mental status is at baseline.   Psychiatric:         Mood and Affect: Mood normal.         Behavior: Behavior normal.         Thought Content: Thought content normal.         Judgment: Judgment normal.        Assessment and Plan (including Health Maintenance)      Problem List  Smart Bitglass  Document Outside HM   :    Plan:   here for fu appt.        mmg 9/2023  Had pap 4/2021 - total hysterectomy 2012   declined flu and covid vaccines  optho - >2 years,. Number given to call  - [facility] [place] *make appt      Colonoscopy - no fam hx ordered now since guidelines changed - reordered for c'scopy states had one years ago.  Scheduled for April 2024  Tdap 3/2023  use mobic sparingly seen PM in the past.      Nonsmoker. Takes asa 81mg daily.    card - 10/2021 saluhuddin - yearly.   Vein center 10/2021  Keep f/u with Trey. saw alexey 1/2021 q6 months has procedure in past 9/14/2023     Keep f/u with Nael for CTS surgery. Left first, then right. last seen 9/2020 states worsening and moving  And left knee chronic pain and swelling           Health Maintenance Due   Topic Date Due    Colorectal Cancer  Screening  Never done    Shingles Vaccine (1 of 2) Never done       Problem List Items Addressed This Visit          Cardiac/Vascular    Venous insufficiency    Hypertension    Relevant Orders    CBC Auto Differential    Comprehensive Metabolic Panel    Hyperlipidemia    Relevant Orders    Lipid Panel       Endocrine    Hypothyroidism - Primary    Relevant Orders    Thyroid Peroxidase Antibody    PTH, Intact    T3, Free    Thyroid Panel    Hyperglycemia    Relevant Orders    Hemoglobin A1C       Palliative Care    On long term drug therapy       Health Maintenance Topics with due status: Not Due       Topic Last Completion Date    TETANUS VACCINE 03/06/2023    Hemoglobin A1c (Diabetic Prevention Screening) 03/07/2023    Lipid Panel 09/11/2023    Mammogram 09/20/2023       Future Appointments   Date Time Provider Department Center   3/11/2024  9:00 AM Alvina Shi DNP [facility]   4/3/2024 12:00 PM [facility] GI ROOM 02 [facility]   9/9/2024 10:30 AM Ed Yang DO [facility]   9/26/2024  9:30 AM [facility]            Signature:  Alvina Shi DNP      [address] [place] [address]    Date of encounter: 2/5/24

## 2024-02-06 LAB
EST. AVERAGE GLUCOSE BLD GHB EST-MCNC: 117 MG/DL
HBA1C MFR BLD HPLC: 5.7 % (ref 4.5–6.6)

## 2024-02-13 NOTE — PROGRESS NOTES
This could be related to her autoimmune disorder. Would have her discuss it with them when she goes back and if she would like me to refer her we can try endo in [place] or [facility] just let me know.

## 2024-02-14 ENCOUNTER — TELEPHONE (OUTPATIENT)
Dept: FAMILY MEDICINE | Facility: CLINIC | Age: 49
End: 2024-02-14
Payer: MEDICAID

## 2024-02-14 DIAGNOSIS — E03.9 HYPOTHYROIDISM, UNSPECIFIED TYPE: Primary | ICD-10-CM

## 2024-02-14 NOTE — TELEPHONE ENCOUNTER
----- Message from Alvina Shi DNP sent at 2/13/2024  2:15 PM CST -----  This could be related to her autoimmune disorder. Would have her discuss it with them when she goes back and if she would like me to refer her we can try endo in [place] or [facility] just let me know.

## 2024-03-27 DIAGNOSIS — Z12.11 SCREEN FOR COLON CANCER: Primary | ICD-10-CM

## 2024-03-27 RX ORDER — POLYETHYLENE GLYCOL 3350, SODIUM SULFATE ANHYDROUS, SODIUM BICARBONATE, SODIUM CHLORIDE, POTASSIUM CHLORIDE 236; 22.74; 6.74; 5.86; 2.97 G/4L; G/4L; G/4L; G/4L; G/4L
4 POWDER, FOR SOLUTION ORAL ONCE
Qty: 4000 ML | Refills: 0 | Status: SHIPPED | OUTPATIENT
Start: 2024-03-27 | End: 2024-03-27

## 2024-04-01 DIAGNOSIS — Z12.11 SCREENING FOR COLON CANCER: Primary | ICD-10-CM

## 2024-04-01 DIAGNOSIS — Z12.11 COLON CANCER SCREENING: Primary | ICD-10-CM

## 2024-04-01 RX ORDER — POLYETHYLENE GLYCOL 3350, SODIUM SULFATE ANHYDROUS, SODIUM BICARBONATE, SODIUM CHLORIDE, POTASSIUM CHLORIDE 236; 22.74; 6.74; 5.86; 2.97 G/4L; G/4L; G/4L; G/4L; G/4L
4 POWDER, FOR SOLUTION ORAL ONCE
Qty: 4000 ML | Refills: 0 | Status: SHIPPED | OUTPATIENT
Start: 2024-04-01 | End: 2024-04-01

## 2024-04-08 ENCOUNTER — APPOINTMENT (OUTPATIENT)
Dept: RADIOLOGY | Facility: CLINIC | Age: 49
End: 2024-04-08
Attending: NURSE PRACTITIONER
Payer: MEDICAID

## 2024-04-08 ENCOUNTER — OFFICE VISIT (OUTPATIENT)
Dept: FAMILY MEDICINE | Facility: CLINIC | Age: 49
End: 2024-04-08
Payer: MEDICAID

## 2024-04-08 VITALS
HEIGHT: 65 IN | WEIGHT: 250 LBS | HEART RATE: 79 BPM | TEMPERATURE: 99 F | OXYGEN SATURATION: 100 % | DIASTOLIC BLOOD PRESSURE: 80 MMHG | BODY MASS INDEX: 41.65 KG/M2 | SYSTOLIC BLOOD PRESSURE: 132 MMHG

## 2024-04-08 DIAGNOSIS — E03.9 HYPOTHYROIDISM, UNSPECIFIED TYPE: ICD-10-CM

## 2024-04-08 DIAGNOSIS — M25.552 LEFT HIP PAIN: ICD-10-CM

## 2024-04-08 DIAGNOSIS — G89.29 CHRONIC BILATERAL LOW BACK PAIN WITH LEFT-SIDED SCIATICA: Primary | ICD-10-CM

## 2024-04-08 DIAGNOSIS — E21.3 HYPERPARATHYROIDISM: ICD-10-CM

## 2024-04-08 DIAGNOSIS — G89.29 CHRONIC BILATERAL LOW BACK PAIN WITH LEFT-SIDED SCIATICA: ICD-10-CM

## 2024-04-08 DIAGNOSIS — M54.42 CHRONIC BILATERAL LOW BACK PAIN WITH LEFT-SIDED SCIATICA: ICD-10-CM

## 2024-04-08 DIAGNOSIS — M54.42 CHRONIC BILATERAL LOW BACK PAIN WITH LEFT-SIDED SCIATICA: Primary | ICD-10-CM

## 2024-04-08 LAB
PTH-INTACT SERPL-MCNC: 114.6 PG/ML (ref 18.4–80.1)
T3FREE SERPL-MCNC: 2.39 PG/ML (ref 2.18–3.98)
T4 FREE SERPL-MCNC: 0.94 NG/DL (ref 0.76–1.46)
THYROPEROXIDASE AB SERPL-ACNC: 292 U/ML (ref 0–60)
TSH SERPL DL<=0.005 MIU/L-ACNC: 2.89 UIU/ML (ref 0.36–3.74)

## 2024-04-08 PROCEDURE — 72100 X-RAY EXAM L-S SPINE 2/3 VWS: CPT | Mod: 26,,, | Performed by: STUDENT IN AN ORGANIZED HEALTH CARE EDUCATION/TRAINING PROGRAM

## 2024-04-08 PROCEDURE — 84439 ASSAY OF FREE THYROXINE: CPT | Mod: ,,, | Performed by: CLINICAL MEDICAL LABORATORY

## 2024-04-08 PROCEDURE — 72100 X-RAY EXAM L-S SPINE 2/3 VWS: CPT | Mod: TC,RHCUB,FY | Performed by: NURSE PRACTITIONER

## 2024-04-08 PROCEDURE — 83970 ASSAY OF PARATHORMONE: CPT | Mod: ,,, | Performed by: CLINICAL MEDICAL LABORATORY

## 2024-04-08 PROCEDURE — 84443 ASSAY THYROID STIM HORMONE: CPT | Mod: ,,, | Performed by: CLINICAL MEDICAL LABORATORY

## 2024-04-08 PROCEDURE — 99214 OFFICE O/P EST MOD 30 MIN: CPT | Mod: 25,,, | Performed by: NURSE PRACTITIONER

## 2024-04-08 PROCEDURE — 86376 MICROSOMAL ANTIBODY EACH: CPT | Mod: ,,, | Performed by: CLINICAL MEDICAL LABORATORY

## 2024-04-08 PROCEDURE — 96372 THER/PROPH/DIAG INJ SC/IM: CPT | Mod: ,,, | Performed by: NURSE PRACTITIONER

## 2024-04-08 PROCEDURE — 84481 FREE ASSAY (FT-3): CPT | Mod: ,,, | Performed by: CLINICAL MEDICAL LABORATORY

## 2024-04-08 PROCEDURE — 3008F BODY MASS INDEX DOCD: CPT | Mod: ,,, | Performed by: NURSE PRACTITIONER

## 2024-04-08 RX ORDER — KETOROLAC TROMETHAMINE 30 MG/ML
60 INJECTION, SOLUTION INTRAMUSCULAR; INTRAVENOUS
Status: COMPLETED | OUTPATIENT
Start: 2024-04-08 | End: 2024-04-08

## 2024-04-08 RX ORDER — DICLOFENAC SODIUM 50 MG/1
50 TABLET, DELAYED RELEASE ORAL 2 TIMES DAILY
Qty: 60 TABLET | Refills: 1 | Status: SHIPPED | OUTPATIENT
Start: 2024-04-08

## 2024-04-08 RX ORDER — METHYLPREDNISOLONE 4 MG/1
TABLET ORAL
Qty: 21 TABLET | Refills: 0 | Status: SHIPPED | OUTPATIENT
Start: 2024-04-08

## 2024-04-08 RX ORDER — BETAMETHASONE SODIUM PHOSPHATE AND BETAMETHASONE ACETATE 3; 3 MG/ML; MG/ML
6 INJECTION, SUSPENSION INTRA-ARTICULAR; INTRALESIONAL; INTRAMUSCULAR; SOFT TISSUE
Status: COMPLETED | OUTPATIENT
Start: 2024-04-08 | End: 2024-04-08

## 2024-04-08 RX ORDER — TIZANIDINE 4 MG/1
4 TABLET ORAL EVERY 8 HOURS
Qty: 20 TABLET | Refills: 0 | Status: SHIPPED | OUTPATIENT
Start: 2024-04-08

## 2024-04-08 RX ADMIN — KETOROLAC TROMETHAMINE 60 MG: 30 INJECTION, SOLUTION INTRAMUSCULAR; INTRAVENOUS at 11:04

## 2024-04-08 RX ADMIN — BETAMETHASONE SODIUM PHOSPHATE AND BETAMETHASONE ACETATE 6 MG: 3; 3 INJECTION, SUSPENSION INTRA-ARTICULAR; INTRALESIONAL; INTRAMUSCULAR; SOFT TISSUE at 11:04

## 2024-04-08 NOTE — PROGRESS NOTES
Alvina Shi DNP   [address] [place] [address]     PATIENT NAME: Maury Mckenzie  : 1975  DATE: 24  MRN: [id number]      Billing Provider: Alvina Shi DNP  Level of Service:   Patient PCP Information       Provider PCP Type    Alvina Shi DNP General            Reason for Visit / Chief Complaint: Hip Pain       Update PCP  Update Chief Complaint         History of Present Illness / Problem Focused Workflow     Maury Mckenzie presents to the clinic with Hip Pain     Hip Pain     Review of Systems     Review of Systems     Medical / Social / Family History     Past Medical History:   Diagnosis Date    Carpal tunnel syndrome     Hyperlipidemia     Hypertension     Hypothyroidism     IBS (irritable bowel syndrome)        Past Surgical History:   Procedure Laterality Date    CARDIAC CATHETERIZATION      Dr. Beatty    CARPAL TUNNEL RELEASE Left     Dr. Byers     SECTION  1996    x 1    HYSTERECTOMY      LASER ABLATION Left 2019    Left GSV Laser Ablation performed by Dr. Juan Yang    LASER ABLATION Left 09/10/2021    Procedure: Left distal GSV Remnant Laser Ablation;  Surgeon: Ed Yang DO;  Location: [facility] [place];  Service: Vascular;  Laterality: Left;    Left Ilioinguinal NB Left     DR MCPHERSON    OOPHORECTOMY      TOTAL ABDOMINAL HYSTERECTOMY      Dr. Christine       Social History  Ms.  reports that she has never smoked. She has never used smokeless tobacco. She reports that she does not currently use alcohol. She reports that she does not use drugs.    Family History  Ms.'s family history includes Hypertension in her mother; No Known Problems in her brother, father, and sister.    Medications and Allergies     Medications  No outpatient medications have been marked as taking for the 24 encounter (Office Visit) with Alvina Shi DNP.     Current Facility-Administered Medications for the 24 encounter (Office  "Visit) with Alvina Shi DNP   Medication Dose Route Frequency Provider Last Rate Last Admin    [COMPLETED] betamethasone acetate-betamethasone sodium phosphate injection 6 mg  6 mg Intramuscular 1 time in Clinic/HOD Alvina Shi DNP   6 mg at 04/08/24 1108    [COMPLETED] ketorolac injection 60 mg  60 mg Intramuscular 1 time in Clinic/HOD Alvina Shi DNP   60 mg at 04/08/24 1108       Allergies  Review of patient's allergies indicates:  No Known Allergies    Physical Examination   /80   Pulse 79   Temp 98.8 °F (37.1 °C)   Ht 5' 5" (1.651 m)   Wt 113.4 kg (250 lb)   SpO2 100%   BMI 41.60 kg/m²    Physical Exam  Vitals and nursing note reviewed.   Constitutional:       Appearance: Normal appearance. She is obese.   HENT:      Head: Normocephalic.      Nose: Nose normal.      Mouth/Throat:      Mouth: Mucous membranes are moist.   Eyes:      Extraocular Movements: Extraocular movements intact.      Conjunctiva/sclera: Conjunctivae normal.      Pupils: Pupils are equal, round, and reactive to light.   Cardiovascular:      Rate and Rhythm: Normal rate and regular rhythm.      Pulses: Normal pulses.      Heart sounds: Normal heart sounds.   Pulmonary:      Effort: Pulmonary effort is normal.      Breath sounds: Normal breath sounds.   Abdominal:      General: Abdomen is flat. Bowel sounds are normal.      Palpations: Abdomen is soft.   Musculoskeletal:         General: Tenderness present.      Cervical back: Normal range of motion and neck supple.      Comments: Varicose LLE    Skin:     General: Skin is warm and dry.      Capillary Refill: Capillary refill takes less than 2 seconds.   Neurological:      General: No focal deficit present.      Mental Status: She is alert and oriented to person, place, and time. Mental status is at baseline.   Psychiatric:         Mood and Affect: Mood normal.         Behavior: Behavior normal.         Thought Content: Thought content normal.         " Judgment: Judgment normal.        Assessment and Plan (including Health Maintenance)      Problem List  Smart Sets  Document Outside HM   :    Plan:   Limp to left hip noted.   At one point was seeing pain management  And missed her MRI     Attempt to set back up   New lumbar xrays today  Tolerated ROM exercises on exam   Increase walking       Health Maintenance Due   Topic Date Due    Colorectal Cancer Screening  Never done    Shingles Vaccine (1 of 2) Never done       Problem List Items Addressed This Visit          Endocrine    Hypothyroidism    Relevant Orders    Thyroid Peroxidase Antibody    PTH, Intact    T3, Free    Thyroid Panel    BMI 38.0-38.9,adult    Hyperparathyroidism    Relevant Orders    Thyroid Peroxidase Antibody    PTH, Intact    T3, Free    Thyroid Panel       Orthopedic    Chronic bilateral low back pain with left-sided sciatica - Primary    Relevant Orders    X-Ray Lumbar Spine AP And Lateral    Left hip pain       Health Maintenance Topics with due status: Not Due       Topic Last Completion Date    TETANUS VACCINE 03/06/2023    Mammogram 09/20/2023    Hemoglobin A1c (Diabetic Prevention Screening) 02/05/2024    Lipid Panel 02/05/2024       Future Appointments   Date Time Provider Department Center   7/10/2024 11:30 AM [facility] GI ROOM 01 [facility]   8/14/2024  9:45 AM Alvina Shi DNP [facility]   9/9/2024 10:30 AM Ed Yang DO [facility]   9/26/2024  9:30 AM [facility]            Signature:  Alvina Shi DNP      [address] [place] [address]    Date of encounter: 4/8/24

## 2024-06-25 DIAGNOSIS — I10 HYPERTENSION, UNSPECIFIED TYPE: ICD-10-CM

## 2024-06-25 RX ORDER — LINACLOTIDE 290 UG/1
290 CAPSULE, GELATIN COATED ORAL DAILY
Qty: 30 CAPSULE | Refills: 1 | Status: SHIPPED | OUTPATIENT
Start: 2024-06-25

## 2024-06-25 RX ORDER — OLMESARTAN MEDOXOMIL 5 MG/1
5 TABLET ORAL DAILY
Qty: 90 TABLET | Refills: 1 | Status: SHIPPED | OUTPATIENT
Start: 2024-06-25

## 2024-06-26 DIAGNOSIS — Z12.11 SCREEN FOR COLON CANCER: Primary | ICD-10-CM

## 2024-06-26 RX ORDER — POLYETHYLENE GLYCOL 3350, SODIUM SULFATE ANHYDROUS, SODIUM BICARBONATE, SODIUM CHLORIDE, POTASSIUM CHLORIDE 236; 22.74; 6.74; 5.86; 2.97 G/4L; G/4L; G/4L; G/4L; G/4L
4 POWDER, FOR SOLUTION ORAL ONCE
Qty: 4000 ML | Refills: 0 | Status: SHIPPED | OUTPATIENT
Start: 2024-06-26 | End: 2024-06-26

## 2024-07-10 ENCOUNTER — ANESTHESIA (OUTPATIENT)
Dept: GASTROENTEROLOGY | Facility: HOSPITAL | Age: 49
End: 2024-07-10
Payer: MEDICAID

## 2024-07-10 ENCOUNTER — ANESTHESIA EVENT (OUTPATIENT)
Dept: GASTROENTEROLOGY | Facility: HOSPITAL | Age: 49
End: 2024-07-10
Payer: MEDICAID

## 2024-07-10 ENCOUNTER — HOSPITAL ENCOUNTER (OUTPATIENT)
Dept: GASTROENTEROLOGY | Facility: HOSPITAL | Age: 49
Discharge: HOME | End: 2024-07-10
Attending: INTERNAL MEDICINE | Admitting: INTERNAL MEDICINE
Payer: MEDICAID

## 2024-07-10 VITALS
RESPIRATION RATE: 12 BRPM | BODY MASS INDEX: 38.32 KG/M2 | DIASTOLIC BLOOD PRESSURE: 65 MMHG | WEIGHT: 230 LBS | TEMPERATURE: 98 F | SYSTOLIC BLOOD PRESSURE: 137 MMHG | HEIGHT: 65 IN | HEART RATE: 76 BPM | OXYGEN SATURATION: 100 %

## 2024-07-10 DIAGNOSIS — Z12.11 SCREENING FOR COLON CANCER: ICD-10-CM

## 2024-07-10 PROCEDURE — 37000008 HC ANESTHESIA 1ST 15 MINUTES

## 2024-07-10 PROCEDURE — 37000009 HC ANESTHESIA EA ADD 15 MINS

## 2024-07-10 PROCEDURE — 25000003 PHARM REV CODE 250: Performed by: NURSE ANESTHETIST, CERTIFIED REGISTERED

## 2024-07-10 PROCEDURE — 63600175 PHARM REV CODE 636 W HCPCS: Performed by: NURSE ANESTHETIST, CERTIFIED REGISTERED

## 2024-07-10 RX ORDER — SODIUM CHLORIDE 0.9 % (FLUSH) 0.9 %
10 SYRINGE (ML) INJECTION
Status: DISCONTINUED | OUTPATIENT
Start: 2024-07-10 | End: 2024-07-11 | Stop reason: HOSPADM

## 2024-07-10 RX ORDER — PROPOFOL 10 MG/ML
VIAL (ML) INTRAVENOUS
Status: DISCONTINUED | OUTPATIENT
Start: 2024-07-10 | End: 2024-07-10

## 2024-07-10 RX ORDER — LIDOCAINE HYDROCHLORIDE 20 MG/ML
INJECTION, SOLUTION EPIDURAL; INFILTRATION; INTRACAUDAL; PERINEURAL
Status: DISCONTINUED | OUTPATIENT
Start: 2024-07-10 | End: 2024-07-10

## 2024-07-10 RX ADMIN — PROPOFOL 75 MG: 10 INJECTION, EMULSION INTRAVENOUS at 02:07

## 2024-07-10 RX ADMIN — PROPOFOL 100 MG: 10 INJECTION, EMULSION INTRAVENOUS at 02:07

## 2024-07-10 RX ADMIN — LIDOCAINE HYDROCHLORIDE 80 MG: 20 INJECTION, SOLUTION INTRAVENOUS at 02:07

## 2024-07-10 RX ADMIN — SODIUM CHLORIDE: 9 INJECTION, SOLUTION INTRAVENOUS at 02:07

## 2024-07-10 NOTE — TRANSFER OF CARE
"Anesthesia Transfer of Care Note    Patient: Mauyr Mckenzie    Procedure(s) Performed: * No procedures listed *    Patient location: GI    Anesthesia Type: MAC    Transport from OR: Transported from OR on room air with adequate spontaneous ventilation    Post pain: adequate analgesia    Post assessment: no apparent anesthetic complications    Post vital signs: stable    Level of consciousness: sedated and responds to stimulation    Nausea/Vomiting: no nausea/vomiting    Complications: none    Transfer of care protocol was followedComments: Good SV continue, NAD noted, VSS, RTRN      Last vitals: Visit Vitals  /77 (BP Location: Right arm, Patient Position: Lying)   Pulse 77   Temp 36.4 °C (97.5 °F) (Oral)   Resp 17   Ht 5' 5" (1.651 m)   Wt 104.3 kg (230 lb)   SpO2 98%   Breastfeeding No   BMI 38.27 kg/m²     "

## 2024-07-10 NOTE — ANESTHESIA PREPROCEDURE EVALUATION
07/10/2024  Maury Mckenzie is a 48 y.o., female.      Pre-op Assessment    I have reviewed the Patient Summary Reports.     I have reviewed the Nursing Notes. I have reviewed the NPO Status.   I have reviewed the Medications.     Review of Systems  Anesthesia Hx:   History of prior surgery of interest to airway management or planning:          Denies Family Hx of Anesthesia complications.    Denies Personal Hx of Anesthesia complications.                    Cardiovascular:     Hypertension                                  Hypertension         Musculoskeletal:  Arthritis        Arthritis          Neurological:    Neuromuscular Disease,           Arthritis                         Neuromuscular Disease   Endocrine:   Hypothyroidism       Hypothyroidism            Active Ambulatory Problems     Diagnosis Date Noted    Edema, lower extremity 07/07/2021    Venous insufficiency 07/07/2021    Other skin changes 07/07/2021    Pain of left lower extremity 07/07/2021    Edema 08/09/2021    Hypertension 09/01/2021    Hypothyroidism 09/01/2021    Arthritis 09/01/2021    Hyperlipidemia 09/01/2021    Venous insufficiency (chronic) (peripheral) 09/10/2021    Chest wall muscle strain 11/16/2021    Rib pain on left side 11/16/2021    Abdominal pain 11/16/2021    Constipation 12/06/2021    Screening for viral disease 12/06/2021    Hyperglycemia 12/06/2021    Myalgia 12/29/2021    COVID-19 12/30/2021    Cough 12/30/2021    Pain in left foot 03/07/2022    Acute otalgia, bilateral 04/05/2022    Vaginal discharge 09/07/2022    BMI 38.0-38.9,adult 09/07/2022    Vitamin D deficiency 09/14/2022    Abdominal wall abscess 03/06/2023    On long term drug therapy 03/07/2023    Screening for colon cancer 03/07/2023    Sinusitis 04/20/2023    Abscess of buttock, right 05/23/2023    Localized swelling of left foot 05/23/2023    Sprain of  left ankle 06/05/2023    Acute left ankle pain 06/05/2023    Olecranon bursitis of left elbow 07/19/2023    Left elbow pain 07/19/2023    Bronchitis 09/06/2023    Pain and swelling of left knee 09/14/2023    Primary osteoarthritis of left knee 11/15/2023    Chronic bilateral low back pain with left-sided sciatica 04/08/2024    Hyperparathyroidism 04/08/2024    Left hip pain 04/08/2024     Resolved Ambulatory Problems     Diagnosis Date Noted    Acute non-recurrent maxillary sinusitis 04/05/2022     Past Medical History:   Diagnosis Date    Carpal tunnel syndrome     IBS (irritable bowel syndrome)     Review of patient's allergies indicates:  No Known Allergies   Current Outpatient Medications on File Prior to Visit   Medication Sig Dispense Refill    aspirin (ECOTRIN) 81 MG EC tablet Take 1 tablet (81 mg total) by mouth once daily. 90 tablet 1    cloNIDine (CATAPRES) 0.2 MG tablet Take 1 tablet (0.2 mg total) by mouth every evening. 90 tablet 1    diclofenac (CATAFLAM) 50 MG tablet Take 1 tablet (50 mg total) by mouth 2 (two) times daily. 60 tablet 5    diclofenac (VOLTAREN) 50 MG EC tablet       diclofenac (VOLTAREN) 50 MG EC tablet Take 1 tablet (50 mg total) by mouth 2 (two) times daily. 60 tablet 1    ergocalciferol (ERGOCALCIFEROL) 50,000 unit Cap Take 1 capsule (50,000 Units total) by mouth every 7 days. 12 capsule 1    fluticasone propionate (FLONASE) 50 mcg/actuation nasal spray 1 spray (50 mcg total) by Each Nostril route once daily. (Patient not taking: Reported on 2/5/2024) 11.1 mL 0    furosemide (LASIX) 20 MG tablet Take 1 tablet (20 mg total) by mouth once daily. 90 tablet 1    levocetirizine (XYZAL) 5 MG tablet Take 1 tablet (5 mg total) by mouth every evening. 30 tablet 1    levothyroxine (SYNTHROID) 25 MCG tablet Take 1 tablet (25 mcg total) by mouth before breakfast. 90 tablet 1    LINZESS 290 mcg Cap capsule TAKE 1 CAPSULE (290 MCG TOTAL) BY MOUTH ONCE DAILY. 30 capsule 1    methylPREDNISolone  "(MEDROL DOSEPACK) 4 mg tablet use as directed 21 tablet 0    NOVOFINE 32 32 gauge x /4" Ndle SMARTSIG:Syringe(s) SUB-Q      olmesartan (BENICAR) 5 MG Tab TAKE 1 TABLET (5 MG TOTAL) BY MOUTH ONCE DAILY. 90 tablet 1    pravastatin (PRAVACHOL) 40 MG tablet Take 1 tablet (40 mg total) by mouth every evening. 90 tablet 1    tiZANidine (ZANAFLEX) 4 MG tablet Take 1 tablet (4 mg total) by mouth nightly as needed (muscle spasms). 60 tablet 1    tiZANidine (ZANAFLEX) 4 MG tablet Take 1 tablet (4 mg total) by mouth every 8 (eight) hours. 20 tablet 0    VICTOZA 3-CARIE 0.6 mg/0.1 mL (18 mg/3 mL) PnIj pen Inject 0.6 mg into the skin once daily. 4 Pen 5     No current facility-administered medications on file prior to visit.      Past Surgical History:   Procedure Laterality Date    CARDIAC CATHETERIZATION      Dr. Beatty    CARPAL TUNNEL RELEASE Left     Dr. Byers     SECTION  1996    x 1    HYSTERECTOMY      LASER ABLATION Left 2019    Left GSV Laser Ablation performed by Dr. Juan Yang    LASER ABLATION Left 09/10/2021    Procedure: Left distal GSV Remnant Laser Ablation;  Surgeon: Ed Yang DO;  Location: [facility];  Service: Vascular;  Laterality: Left;    Left Ilioinguinal NB Left     DR MCPHERSON    OOPHORECTOMY      TOTAL ABDOMINAL HYSTERECTOMY      Dr. Christine        Physical Exam  General: Well nourished    Airway:  Mallampati: II   Mouth Opening: Normal  TM Distance: Normal, at least 6 cm  Tongue: Normal  Neck ROM: Normal ROM        Anesthesia Plan  Type of Anesthesia, risks & benefits discussed:    Anesthesia Type: MAC  Intra-op Monitoring Plan: Standard ASA Monitors  Post Op Pain Control Plan: multimodal analgesia  Induction:  IV  Informed Consent: Informed consent signed with the Patient and all parties understand the risks and agree with anesthesia plan.  All questions answered. Patient consented to blood products? No  ASA Score: 3  Day of Surgery Review of History & Physical: H&P " Update referred to the surgeon/provider.I have interviewed and examined the patient. I have reviewed the patient's H&P dated: There are no significant changes.     Ready For Surgery From Anesthesia Perspective.     .

## 2024-07-10 NOTE — DISCHARGE INSTRUCTIONS
Procedure Date  7/10/24     Impression  Overall Impression:   Scattered diverticulosis of moderate severity  The ileocecal valve, cecum and remaining colon appeared otherwise normal allowing for limited views  The prep was inadequate for screening  (grade 1) hemorrhoids        Recommendation  Repeat colonoscopy in 1 year with a 2-day prep; no endocuff      Start a daily fiber supplement with Citrucel or Fibercon (can purchase at your local pharmacy)  Use Miralax 17 grams daily-to-twice daily as needed to have a regular, soft BM without straining  Drink at least 60-80 ounces of water daily unless you have a medical condition that requires fluid restriction      Outcome of procedure: successful Colonoscopy  Disposition: patient to recovery following procedure; discharge to home when appropriate parameters met  Provisions for follow up: please call my office for any unexpected symptoms like chest or abdominal pain or bleeding following your procedure.  Final Diagnosis: CRC screening       DO NOT DRIVE FOR 24 HOURS OR OPERATE HEAVY MACHINERY.   DO NOT SIGN LEGAL DOCUMENTS.  DRINK PLENTY OF FLUIDS. RESUME DIET.

## 2024-07-10 NOTE — ANESTHESIA POSTPROCEDURE EVALUATION
Anesthesia Post Evaluation    Patient: Maury Mckenzie    Procedure(s) Performed: * No procedures listed *    Final Anesthesia Type: MAC      Patient location during evaluation: GI PACU ([facility])  Patient participation: Yes- Able to Participate  Level of consciousness: awake and alert  Post-procedure vital signs: reviewed and stable  Pain management: adequate  Airway patency: patent    PONV status at discharge: No PONV  Anesthetic complications: no      Cardiovascular status: blood pressure returned to baseline, hemodynamically stable and stable  Respiratory status: unassisted  Hydration status: euvolemic  Follow-up not needed.  Comments: Refer to nursing note for pain/tesha score upon discharge from recovery.               Vitals Value Taken Time   /65 07/10/24 1526   Temp 36.4 °C (97.5 °F) 07/10/24 1451   Pulse 76 07/10/24 1526   Resp 12 07/10/24 1526   SpO2 100 % 07/10/24 1526         No case tracking events are documented in the log.      Pain/Tesha Score: Tesha Score: 10 (7/10/2024  3:14 PM)

## 2024-07-10 NOTE — H&P
Gastroenterology Pre-procedure H&P    History of Present Illness    Maury Mckenzie is a 48 y.o. female that  has a past medical history of Carpal tunnel syndrome, Hyperlipidemia, Hypertension, Hypothyroidism, and IBS (irritable bowel syndrome).     Patient here for routine screening     Patient denies wt loss, abdominal pain, diarrhea, melena/hematochezia, change in stool caliber, no anticoagulants, FMHx of GI related malignancies.      Past Medical History:   Diagnosis Date    Carpal tunnel syndrome     Hyperlipidemia     Hypertension     Hypothyroidism     IBS (irritable bowel syndrome)        Past Surgical History:   Procedure Laterality Date    CARDIAC CATHETERIZATION      Dr. Beatty    CARPAL TUNNEL RELEASE Left     Dr. Byers     SECTION  1996    x 1    HYSTERECTOMY      LASER ABLATION Left 2019    Left GSV Laser Ablation performed by Dr. Juan Yang    LASER ABLATION Left 09/10/2021    Procedure: Left distal GSV Remnant Laser Ablation;  Surgeon: Ed Yang DO;  Location: [facility];  Service: Vascular;  Laterality: Left;    Left Ilioinguinal NB Left     DR MCPHERSON    OOPHORECTOMY      TOTAL ABDOMINAL HYSTERECTOMY      Dr. Christine       Family History   Problem Relation Name Age of Onset    Hypertension Mother      No Known Problems Father      No Known Problems Sister          2 Living Ages 40 and 22    No Known Problems Brother          4 Living Ages 50, 46, unknown ages on other 2        Social History     Socioeconomic History    Marital status: Single   Tobacco Use    Smoking status: Never    Smokeless tobacco: Never   Substance and Sexual Activity    Alcohol use: Not Currently    Drug use: Never       Current Outpatient Medications   Medication Sig Dispense Refill    aspirin (ECOTRIN) 81 MG EC tablet Take 1 tablet (81 mg total) by mouth once daily. 90 tablet 1    cloNIDine (CATAPRES) 0.2 MG tablet Take 1 tablet (0.2 mg total) by mouth every evening. 90 tablet 1     "diclofenac (CATAFLAM) 50 MG tablet Take 1 tablet (50 mg total) by mouth 2 (two) times daily. 60 tablet 5    diclofenac (VOLTAREN) 50 MG EC tablet       diclofenac (VOLTAREN) 50 MG EC tablet Take 1 tablet (50 mg total) by mouth 2 (two) times daily. 60 tablet 1    ergocalciferol (ERGOCALCIFEROL) 50,000 unit Cap Take 1 capsule (50,000 Units total) by mouth every 7 days. 12 capsule 1    fluticasone propionate (FLONASE) 50 mcg/actuation nasal spray 1 spray (50 mcg total) by Each Nostril route once daily. (Patient not taking: Reported on 2/5/2024) 11.1 mL 0    furosemide (LASIX) 20 MG tablet Take 1 tablet (20 mg total) by mouth once daily. 90 tablet 1    levocetirizine (XYZAL) 5 MG tablet Take 1 tablet (5 mg total) by mouth every evening. 30 tablet 1    levothyroxine (SYNTHROID) 25 MCG tablet Take 1 tablet (25 mcg total) by mouth before breakfast. 90 tablet 1    LINZESS 290 mcg Cap capsule TAKE 1 CAPSULE (290 MCG TOTAL) BY MOUTH ONCE DAILY. 30 capsule 1    methylPREDNISolone (MEDROL DOSEPACK) 4 mg tablet use as directed 21 tablet 0    NOVOFINE 32 32 gauge x 1/4" Ndle SMARTSIG:Syringe(s) SUB-Q      olmesartan (BENICAR) 5 MG Tab TAKE 1 TABLET (5 MG TOTAL) BY MOUTH ONCE DAILY. 90 tablet 1    pravastatin (PRAVACHOL) 40 MG tablet Take 1 tablet (40 mg total) by mouth every evening. 90 tablet 1    tiZANidine (ZANAFLEX) 4 MG tablet Take 1 tablet (4 mg total) by mouth nightly as needed (muscle spasms). 60 tablet 1    tiZANidine (ZANAFLEX) 4 MG tablet Take 1 tablet (4 mg total) by mouth every 8 (eight) hours. 20 tablet 0    VICTOZA 3-CARIE 0.6 mg/0.1 mL (18 mg/3 mL) PnIj pen Inject 0.6 mg into the skin once daily. 4 Pen 5     No current facility-administered medications for this encounter.       Review of patient's allergies indicates:  No Known Allergies    Objective:  There were no vitals filed for this visit.     GEN: normal appearing, NAD, AAO x3  HENT: NCAT, anicteric, OP benign  CV: normal rate, regular rhythm  RESP: NABS, " symmetric rise, unlabored  ABD: soft, ND, no guarding or TTP  SKIN: warm and dry  NEURO: grossly afocal    Assessment and Plan:    Proceed with:    Colonoscopy for screening for colon cancer    Govind Akhtar MD  Gastroenterology

## 2024-09-04 RX ORDER — ERGOCALCIFEROL 1.25 MG/1
50000 CAPSULE ORAL
Qty: 12 CAPSULE | Refills: 1 | Status: SHIPPED | OUTPATIENT
Start: 2024-09-04

## 2024-09-20 DIAGNOSIS — I10 HYPERTENSION, UNSPECIFIED TYPE: ICD-10-CM

## 2024-09-23 ENCOUNTER — OFFICE VISIT (OUTPATIENT)
Dept: FAMILY MEDICINE | Facility: CLINIC | Age: 49
End: 2024-09-23
Payer: MEDICAID

## 2024-09-23 VITALS
SYSTOLIC BLOOD PRESSURE: 137 MMHG | BODY MASS INDEX: 42.49 KG/M2 | HEART RATE: 76 BPM | OXYGEN SATURATION: 100 % | WEIGHT: 255 LBS | DIASTOLIC BLOOD PRESSURE: 75 MMHG | HEIGHT: 65 IN | RESPIRATION RATE: 20 BRPM | TEMPERATURE: 98 F

## 2024-09-23 DIAGNOSIS — R60.0 EDEMA, LOWER EXTREMITY: ICD-10-CM

## 2024-09-23 DIAGNOSIS — M17.12 PRIMARY OSTEOARTHRITIS OF LEFT KNEE: ICD-10-CM

## 2024-09-23 DIAGNOSIS — I10 PRIMARY HYPERTENSION: ICD-10-CM

## 2024-09-23 DIAGNOSIS — M25.562 LEFT KNEE PAIN, UNSPECIFIED CHRONICITY: Primary | ICD-10-CM

## 2024-09-23 PROCEDURE — 3078F DIAST BP <80 MM HG: CPT | Mod: ,,,

## 2024-09-23 PROCEDURE — 99214 OFFICE O/P EST MOD 30 MIN: CPT | Mod: 25,,,

## 2024-09-23 PROCEDURE — 3008F BODY MASS INDEX DOCD: CPT | Mod: ,,,

## 2024-09-23 PROCEDURE — 96372 THER/PROPH/DIAG INJ SC/IM: CPT | Mod: ,,,

## 2024-09-23 RX ORDER — KETOROLAC TROMETHAMINE 30 MG/ML
60 INJECTION, SOLUTION INTRAMUSCULAR; INTRAVENOUS
Status: COMPLETED | OUTPATIENT
Start: 2024-09-23 | End: 2024-09-23

## 2024-09-23 RX ORDER — FUROSEMIDE 20 MG/1
20 TABLET ORAL DAILY
Qty: 90 TABLET | Refills: 1 | Status: SHIPPED | OUTPATIENT
Start: 2024-09-23

## 2024-09-23 RX ORDER — BETAMETHASONE SODIUM PHOSPHATE AND BETAMETHASONE ACETATE 3; 3 MG/ML; MG/ML
6 INJECTION, SUSPENSION INTRA-ARTICULAR; INTRALESIONAL; INTRAMUSCULAR; SOFT TISSUE
Status: COMPLETED | OUTPATIENT
Start: 2024-09-23 | End: 2024-09-23

## 2024-09-23 RX ADMIN — BETAMETHASONE SODIUM PHOSPHATE AND BETAMETHASONE ACETATE 6 MG: 3; 3 INJECTION, SUSPENSION INTRA-ARTICULAR; INTRALESIONAL; INTRAMUSCULAR; SOFT TISSUE at 11:09

## 2024-09-23 RX ADMIN — KETOROLAC TROMETHAMINE 60 MG: 30 INJECTION, SOLUTION INTRAMUSCULAR; INTRAVENOUS at 11:09

## 2024-09-23 NOTE — PROGRESS NOTES
Dustin Grove NP   [address] [place] [address]     PATIENT NAME: Maury Mckenzie  : 1975  DATE: 24  MRN: [id number]      Billing Provider: Dustin Grove NP  Level of Service:   Patient PCP Information       Provider PCP Type    Alvina Shi DNP General            Reason for Visit / Chief Complaint: Knee Pain and Arthritis       Update PCP  Update Chief Complaint         History of Present Illness / Problem Focused Workflow     Maury Mckenzie presents to the clinic with Knee Pain and Arthritis     Patient here today for complaints of left knee pain. Patient reports that she did a lot of walking yesterday when her symptoms began. She is taking Diclofenac. Patient also reports swelling and she has been out of her fluid medication. Furosemide was sent to pharmacy today. BP controlled on current medication.     Knee Pain     Arthritis  Pertinent negatives include no dysuria, fever or rash.       Review of Systems     Review of Systems   Constitutional:  Negative for chills and fever.   HENT:  Negative for nasal congestion, postnasal drip and rhinorrhea.    Eyes: Negative.    Respiratory:  Negative for cough and shortness of breath.    Cardiovascular:  Positive for leg swelling. Negative for chest pain.   Gastrointestinal:  Negative for abdominal pain.   Genitourinary: Negative.  Negative for dysuria and frequency.   Musculoskeletal:  Positive for arthralgias (left knee pain) and arthritis.   Integumentary:  Negative for rash.   Neurological: Negative.    Hematological: Negative.    Psychiatric/Behavioral: Negative.          Medical / Social / Family History     Past Medical History:   Diagnosis Date    Carpal tunnel syndrome     Hyperlipidemia     Hypertension     Hypothyroidism     IBS (irritable bowel syndrome)        Past Surgical History:   Procedure Laterality Date    CARDIAC CATHETERIZATION      Dr. Beatty    CARPAL TUNNEL RELEASE Left     Dr. Byers      "SECTION  1996    x 1    HYSTERECTOMY      LASER ABLATION Left 12/06/2019    Left GSV Laser Ablation performed by Dr. Juan Yang    LASER ABLATION Left 09/10/2021    Procedure: Left distal GSV Remnant Laser Ablation;  Surgeon: Ed Yang DO;  Location: [facility] [place];  Service: Vascular;  Laterality: Left;    Left Ilioinguinal NB Left 2019    DR MCPHERSON    OOPHORECTOMY      TOTAL ABDOMINAL HYSTERECTOMY  2012    Dr. Christine       Social History  Ms.  reports that she has never smoked. She has never used smokeless tobacco. She reports that she does not currently use alcohol. She reports that she does not use drugs.    Family History  Ms.'s family history includes Hypertension in her mother; No Known Problems in her brother, father, and sister.    Medications and Allergies     Medications  No outpatient medications have been marked as taking for the 9/23/24 encounter (Office Visit) with Dustin Grove NP.     Current Facility-Administered Medications for the 9/23/24 encounter (Office Visit) with Dustin Grove NP   Medication Dose Route Frequency Provider Last Rate Last Admin    betamethasone acetate-betamethasone sodium phosphate injection 6 mg  6 mg Intramuscular 1 time in Clinic/HOD Dustin Grove NP        ketorolac injection 60 mg  60 mg Intramuscular 1 time in Clinic/HOD Dustin Grove NP           Allergies  Review of patient's allergies indicates:  No Known Allergies    Physical Examination   /75 (BP Location: Left arm, Patient Position: Sitting, BP Method: Large (Automatic))   Pulse 76   Temp 97.6 °F (36.4 °C) (Oral)   Resp 20   Ht 5' 5" (1.651 m)   Wt 115.7 kg (255 lb)   SpO2 100%   BMI 42.43 kg/m²    Physical Exam  Vitals reviewed.   Constitutional:       Appearance: Normal appearance. She is obese.   HENT:      Mouth/Throat:      Mouth: Mucous membranes are moist.   Eyes:      Conjunctiva/sclera: Conjunctivae normal.      Pupils: Pupils are equal, round, and reactive to " light.   Cardiovascular:      Rate and Rhythm: Normal rate and regular rhythm.      Pulses: Normal pulses.           Dorsalis pedis pulses are 2+ on the right side and 2+ on the left side.        Posterior tibial pulses are 2+ on the right side and 2+ on the left side.      Heart sounds: Normal heart sounds.   Pulmonary:      Effort: Pulmonary effort is normal.      Breath sounds: Normal breath sounds.   Abdominal:      General: Bowel sounds are normal. There is no distension.      Tenderness: There is no abdominal tenderness.   Musculoskeletal:         General: Swelling and tenderness present.      Right lower leg: Edema present.      Left lower leg: Edema present.   Skin:     General: Skin is warm and dry.      Capillary Refill: Capillary refill takes less than 2 seconds.   Neurological:      General: No focal deficit present.      Mental Status: She is alert and oriented to person, place, and time.        Assessment and Plan (including Health Maintenance)      Problem List  [employer]  Document Outside HM   :    Plan:   Continue Diclofenac  Get Furosemide from pharmacy.  Return to clinic as needed           Health Maintenance Due   Topic Date Due    Shingles Vaccine (1 of 2) Never done    COVID-19 Vaccine (1 - 2023-24 season) Never done    Mammogram  09/20/2024       Problem List Items Addressed This Visit    None      Health Maintenance Topics with due status: Not Due       Topic Last Completion Date    TETANUS VACCINE 03/06/2023    Hemoglobin A1c (Diabetic Prevention Screening) 02/05/2024    Lipid Panel 02/05/2024    Colorectal Cancer Screening 07/10/2024       Future Appointments   Date Time Provider Department Center   9/26/2024  9:20 AM [facility]   7/10/2025  7:00 AM [facility] GI ROOM 01 [facility]            Signature:  Dustin Grove NP      [address] [place] [address]    Date of encounter: 9/23/24

## 2024-09-26 ENCOUNTER — HOSPITAL ENCOUNTER (OUTPATIENT)
Dept: RADIOLOGY | Facility: HOSPITAL | Age: 49
Discharge: HOME | End: 2024-09-26
Attending: NURSE PRACTITIONER
Payer: MEDICAID

## 2024-09-26 DIAGNOSIS — Z12.31 OTHER SCREENING MAMMOGRAM: ICD-10-CM

## 2024-09-26 PROCEDURE — 77067 SCR MAMMO BI INCL CAD: CPT | Mod: TC

## 2024-09-26 PROCEDURE — 77063 BREAST TOMOSYNTHESIS BI: CPT | Mod: TC

## 2024-10-19 DIAGNOSIS — I10 HYPERTENSION, UNSPECIFIED TYPE: ICD-10-CM

## 2024-10-21 DIAGNOSIS — I10 HYPERTENSION, UNSPECIFIED TYPE: ICD-10-CM

## 2024-10-21 RX ORDER — DICLOFENAC SODIUM 50 MG/1
50 TABLET, DELAYED RELEASE ORAL 2 TIMES DAILY
Qty: 60 TABLET | Refills: 1 | Status: SHIPPED | OUTPATIENT
Start: 2024-10-21

## 2024-10-21 RX ORDER — CLONIDINE HYDROCHLORIDE 0.2 MG/1
0.2 TABLET ORAL NIGHTLY
Qty: 90 TABLET | Refills: 1 | Status: SHIPPED | OUTPATIENT
Start: 2024-10-21

## 2024-10-21 RX ORDER — ERGOCALCIFEROL 1.25 MG/1
50000 CAPSULE ORAL
Qty: 12 CAPSULE | Refills: 1 | Status: SHIPPED | OUTPATIENT
Start: 2024-10-21

## 2024-10-21 RX ORDER — ASPIRIN 81 MG/1
81 TABLET ORAL DAILY
Qty: 90 TABLET | Refills: 1 | Status: SHIPPED | OUTPATIENT
Start: 2024-10-21

## 2024-10-21 RX ORDER — FUROSEMIDE 20 MG/1
20 TABLET ORAL DAILY
Qty: 90 TABLET | Refills: 1 | Status: SHIPPED | OUTPATIENT
Start: 2024-10-21

## 2024-10-22 RX ORDER — CLONIDINE HYDROCHLORIDE 0.2 MG/1
0.2 TABLET ORAL NIGHTLY
Qty: 90 TABLET | Refills: 1 | Status: SHIPPED | OUTPATIENT
Start: 2024-10-22

## 2024-10-30 DIAGNOSIS — E03.9 HYPOTHYROIDISM, UNSPECIFIED TYPE: ICD-10-CM

## 2024-10-30 RX ORDER — LEVOTHYROXINE SODIUM 25 UG/1
25 TABLET ORAL
Qty: 90 TABLET | Refills: 1 | Status: SHIPPED | OUTPATIENT
Start: 2024-10-30

## 2024-11-01 ENCOUNTER — OFFICE VISIT (OUTPATIENT)
Dept: FAMILY MEDICINE | Facility: CLINIC | Age: 49
End: 2024-11-01
Payer: MEDICAID

## 2024-11-01 VITALS
OXYGEN SATURATION: 97 % | WEIGHT: 250 LBS | DIASTOLIC BLOOD PRESSURE: 83 MMHG | HEIGHT: 65 IN | RESPIRATION RATE: 20 BRPM | TEMPERATURE: 98 F | HEART RATE: 77 BPM | SYSTOLIC BLOOD PRESSURE: 137 MMHG | BODY MASS INDEX: 41.65 KG/M2

## 2024-11-01 DIAGNOSIS — I10 HYPERTENSION, UNSPECIFIED TYPE: Primary | ICD-10-CM

## 2024-11-01 DIAGNOSIS — E03.9 HYPOTHYROIDISM, UNSPECIFIED TYPE: ICD-10-CM

## 2024-11-01 DIAGNOSIS — E21.3 HYPERPARATHYROIDISM: ICD-10-CM

## 2024-11-01 DIAGNOSIS — Z79.899 ON LONG TERM DRUG THERAPY: ICD-10-CM

## 2024-11-01 DIAGNOSIS — E78.5 HYPERLIPIDEMIA, UNSPECIFIED HYPERLIPIDEMIA TYPE: ICD-10-CM

## 2024-11-01 DIAGNOSIS — Z23 NEED FOR VACCINATION: ICD-10-CM

## 2024-11-01 DIAGNOSIS — I87.2 VENOUS INSUFFICIENCY: ICD-10-CM

## 2024-11-01 DIAGNOSIS — R73.9 HYPERGLYCEMIA: ICD-10-CM

## 2024-11-01 LAB
25(OH)D3 SERPL-MCNC: 26 NG/ML
ALBUMIN SERPL BCP-MCNC: 3.6 G/DL (ref 3.5–5)
ALBUMIN/GLOB SERPL: 0.9 {RATIO}
ALP SERPL-CCNC: 51 U/L (ref 41–108)
ALT SERPL W P-5'-P-CCNC: 25 U/L (ref 13–56)
ANION GAP SERPL CALCULATED.3IONS-SCNC: 8 MMOL/L (ref 7–16)
AST SERPL W P-5'-P-CCNC: 22 U/L (ref 15–37)
BASOPHILS # BLD AUTO: 0.06 K/UL (ref 0–0.2)
BASOPHILS NFR BLD AUTO: 1.1 % (ref 0–1)
BILIRUB SERPL-MCNC: 0.8 MG/DL (ref ?–1.2)
BUN SERPL-MCNC: 11 MG/DL (ref 7–18)
BUN/CREAT SERPL: 13 (ref 6–20)
CALCIUM SERPL-MCNC: 9.3 MG/DL (ref 8.5–10.1)
CHLORIDE SERPL-SCNC: 106 MMOL/L (ref 98–107)
CHOLEST SERPL-MCNC: 195 MG/DL (ref 0–200)
CHOLEST/HDLC SERPL: 2.7 {RATIO}
CO2 SERPL-SCNC: 31 MMOL/L (ref 21–32)
CREAT SERPL-MCNC: 0.86 MG/DL (ref 0.55–1.02)
CREAT UR-MCNC: 249 MG/DL (ref 28–219)
DIFFERENTIAL METHOD BLD: ABNORMAL
EGFR (NO RACE VARIABLE) (RUSH/TITUS): 82 ML/MIN/1.73M2
EOSINOPHIL # BLD AUTO: 0.21 K/UL (ref 0–0.5)
EOSINOPHIL NFR BLD AUTO: 3.9 % (ref 1–4)
ERYTHROCYTE [DISTWIDTH] IN BLOOD BY AUTOMATED COUNT: 14.3 % (ref 11.5–14.5)
EST. AVERAGE GLUCOSE BLD GHB EST-MCNC: 117 MG/DL
FOLATE SERPL-MCNC: >20 NG/ML (ref 3.1–17.5)
GLOBULIN SER-MCNC: 4 G/DL (ref 2–4)
GLUCOSE SERPL-MCNC: 95 MG/DL (ref 74–106)
HBA1C MFR BLD HPLC: 5.7 % (ref 4.5–6.6)
HCT VFR BLD AUTO: 41.2 % (ref 38–47)
HDLC SERPL-MCNC: 73 MG/DL (ref 40–60)
HGB BLD-MCNC: 13.1 G/DL (ref 12–16)
IMM GRANULOCYTES # BLD AUTO: 0.01 K/UL (ref 0–0.04)
IMM GRANULOCYTES NFR BLD: 0.2 % (ref 0–0.4)
LDLC SERPL CALC-MCNC: 109 MG/DL
LDLC/HDLC SERPL: 1.5 {RATIO}
LYMPHOCYTES # BLD AUTO: 2.51 K/UL (ref 1–4.8)
LYMPHOCYTES NFR BLD AUTO: 46.5 % (ref 27–41)
MCH RBC QN AUTO: 29 PG (ref 27–31)
MCHC RBC AUTO-ENTMCNC: 31.8 G/DL (ref 32–36)
MCV RBC AUTO: 91.2 FL (ref 80–96)
MICROALBUMIN UR-MCNC: 2.5 MG/DL (ref 0–2.8)
MICROALBUMIN/CREAT RATIO PNL UR: 10 MG/G (ref 0–30)
MONOCYTES # BLD AUTO: 0.42 K/UL (ref 0–0.8)
MONOCYTES NFR BLD AUTO: 7.8 % (ref 2–6)
MPC BLD CALC-MCNC: 11.7 FL (ref 9.4–12.4)
NEUTROPHILS # BLD AUTO: 2.19 K/UL (ref 1.8–7.7)
NEUTROPHILS NFR BLD AUTO: 40.5 % (ref 53–65)
NONHDLC SERPL-MCNC: 122 MG/DL
NRBC # BLD AUTO: 0 X10E3/UL
NRBC, AUTO (.00): 0 %
PLATELET # BLD AUTO: 228 K/UL (ref 150–400)
POTASSIUM SERPL-SCNC: 4 MMOL/L (ref 3.5–5.1)
PROT SERPL-MCNC: 7.6 G/DL (ref 6.4–8.2)
RBC # BLD AUTO: 4.52 M/UL (ref 4.2–5.4)
SODIUM SERPL-SCNC: 141 MMOL/L (ref 136–145)
T4 FREE SERPL-MCNC: 1.1 NG/DL (ref 0.76–1.46)
TRIGL SERPL-MCNC: 65 MG/DL (ref 35–150)
TSH SERPL DL<=0.005 MIU/L-ACNC: 3.82 UIU/ML (ref 0.36–3.74)
VIT B12 SERPL-MCNC: 570 PG/ML (ref 193–986)
VLDLC SERPL-MCNC: 13 MG/DL
WBC # BLD AUTO: 5.4 K/UL (ref 4.5–11)

## 2024-11-01 PROCEDURE — 82306 VITAMIN D 25 HYDROXY: CPT | Mod: ,,, | Performed by: CLINICAL MEDICAL LABORATORY

## 2024-11-01 PROCEDURE — 80061 LIPID PANEL: CPT | Mod: ,,, | Performed by: CLINICAL MEDICAL LABORATORY

## 2024-11-01 PROCEDURE — 82607 VITAMIN B-12: CPT | Mod: ,,, | Performed by: CLINICAL MEDICAL LABORATORY

## 2024-11-01 PROCEDURE — 85025 COMPLETE CBC W/AUTO DIFF WBC: CPT | Mod: ,,, | Performed by: CLINICAL MEDICAL LABORATORY

## 2024-11-01 PROCEDURE — 82570 ASSAY OF URINE CREATININE: CPT | Mod: ,,, | Performed by: CLINICAL MEDICAL LABORATORY

## 2024-11-01 PROCEDURE — 80053 COMPREHEN METABOLIC PANEL: CPT | Mod: ,,, | Performed by: CLINICAL MEDICAL LABORATORY

## 2024-11-01 PROCEDURE — 83036 HEMOGLOBIN GLYCOSYLATED A1C: CPT | Mod: ,,, | Performed by: CLINICAL MEDICAL LABORATORY

## 2024-11-01 PROCEDURE — 82746 ASSAY OF FOLIC ACID SERUM: CPT | Mod: ,,, | Performed by: CLINICAL MEDICAL LABORATORY

## 2024-11-01 PROCEDURE — 84443 ASSAY THYROID STIM HORMONE: CPT | Mod: ,,, | Performed by: CLINICAL MEDICAL LABORATORY

## 2024-11-01 PROCEDURE — 82043 UR ALBUMIN QUANTITATIVE: CPT | Mod: ,,, | Performed by: CLINICAL MEDICAL LABORATORY

## 2024-11-01 PROCEDURE — 84439 ASSAY OF FREE THYROXINE: CPT | Mod: ,,, | Performed by: CLINICAL MEDICAL LABORATORY

## 2024-11-01 RX ORDER — PRAVASTATIN SODIUM 40 MG/1
40 TABLET ORAL NIGHTLY
Qty: 90 TABLET | Refills: 1 | Status: SHIPPED | OUTPATIENT
Start: 2024-11-01

## 2024-11-01 RX ORDER — TIZANIDINE 4 MG/1
4 TABLET ORAL NIGHTLY PRN
Qty: 90 TABLET | Refills: 1 | Status: SHIPPED | OUTPATIENT
Start: 2024-11-01

## 2024-11-01 RX ORDER — OLMESARTAN MEDOXOMIL 5 MG/1
10 TABLET ORAL DAILY
Qty: 180 TABLET | Refills: 1 | Status: SHIPPED | OUTPATIENT
Start: 2024-11-01

## 2024-11-01 RX ORDER — DICLOFENAC POTASSIUM 50 MG/1
50 TABLET, FILM COATED ORAL 2 TIMES DAILY
Qty: 60 TABLET | Refills: 5 | Status: SHIPPED | OUTPATIENT
Start: 2024-11-01

## 2024-12-17 ENCOUNTER — OFFICE VISIT (OUTPATIENT)
Dept: FAMILY MEDICINE | Facility: CLINIC | Age: 49
End: 2024-12-17
Payer: MEDICAID

## 2024-12-17 VITALS
HEART RATE: 75 BPM | DIASTOLIC BLOOD PRESSURE: 82 MMHG | WEIGHT: 246.38 LBS | TEMPERATURE: 98 F | SYSTOLIC BLOOD PRESSURE: 136 MMHG | HEIGHT: 65 IN | OXYGEN SATURATION: 100 % | BODY MASS INDEX: 41.05 KG/M2

## 2024-12-17 DIAGNOSIS — M17.12 PRIMARY OSTEOARTHRITIS OF LEFT KNEE: Primary | ICD-10-CM

## 2024-12-17 DIAGNOSIS — M25.562 PAIN AND SWELLING OF LEFT KNEE: ICD-10-CM

## 2024-12-17 DIAGNOSIS — M25.552 LEFT HIP PAIN: ICD-10-CM

## 2024-12-17 DIAGNOSIS — M25.462 PAIN AND SWELLING OF LEFT KNEE: ICD-10-CM

## 2024-12-17 PROCEDURE — 96372 THER/PROPH/DIAG INJ SC/IM: CPT | Mod: ,,, | Performed by: NURSE PRACTITIONER

## 2024-12-17 PROCEDURE — 99213 OFFICE O/P EST LOW 20 MIN: CPT | Mod: 25,,, | Performed by: NURSE PRACTITIONER

## 2024-12-17 PROCEDURE — 3008F BODY MASS INDEX DOCD: CPT | Mod: ,,, | Performed by: NURSE PRACTITIONER

## 2024-12-17 RX ORDER — BETAMETHASONE SODIUM PHOSPHATE AND BETAMETHASONE ACETATE 3; 3 MG/ML; MG/ML
6 INJECTION, SUSPENSION INTRA-ARTICULAR; INTRALESIONAL; INTRAMUSCULAR; SOFT TISSUE
Status: COMPLETED | OUTPATIENT
Start: 2024-12-17 | End: 2024-12-17

## 2024-12-17 RX ORDER — METHYLPREDNISOLONE 4 MG/1
TABLET ORAL
Qty: 21 TABLET | Refills: 0 | Status: SHIPPED | OUTPATIENT
Start: 2024-12-17

## 2024-12-17 RX ORDER — INDOMETHACIN 50 MG/1
50 CAPSULE ORAL 3 TIMES DAILY
Qty: 30 CAPSULE | Refills: 0 | Status: SHIPPED | OUTPATIENT
Start: 2024-12-17

## 2024-12-17 RX ORDER — TIZANIDINE 4 MG/1
4 TABLET ORAL NIGHTLY PRN
Qty: 90 TABLET | Refills: 1 | Status: SHIPPED | OUTPATIENT
Start: 2024-12-17

## 2024-12-17 RX ORDER — COLCHICINE 0.6 MG/1
TABLET ORAL
Qty: 8 TABLET | Refills: 0 | Status: SHIPPED | OUTPATIENT
Start: 2024-12-17

## 2024-12-17 RX ORDER — KETOROLAC TROMETHAMINE 30 MG/ML
60 INJECTION, SOLUTION INTRAMUSCULAR; INTRAVENOUS
Status: COMPLETED | OUTPATIENT
Start: 2024-12-17 | End: 2024-12-17

## 2024-12-17 RX ADMIN — BETAMETHASONE SODIUM PHOSPHATE AND BETAMETHASONE ACETATE 6 MG: 3; 3 INJECTION, SUSPENSION INTRA-ARTICULAR; INTRALESIONAL; INTRAMUSCULAR; SOFT TISSUE at 09:12

## 2024-12-17 RX ADMIN — KETOROLAC TROMETHAMINE 60 MG: 30 INJECTION, SOLUTION INTRAMUSCULAR; INTRAVENOUS at 09:12

## 2024-12-17 NOTE — PROGRESS NOTES
Alvina Shi DNP   [address] [place] [address]     PATIENT NAME: Maury Mckenzie  : 1975  DATE: 24  MRN: [id number]      Billing Provider: Alvina Shi DNP  Level of Service:   Patient PCP Information       Provider PCP Type    Alvina Shi DNP General            Reason for Visit / Chief Complaint: Knee Pain and Hip Pain       Update PCP  Update Chief Complaint         History of Present Illness / Problem Focused Workflow     Maury Mckenzie presents to the clinic with Knee Pain and Hip Pain     Knee Pain     Hip Pain     Review of Systems     Review of Systems     Medical / Social / Family History     Past Medical History:   Diagnosis Date    Carpal tunnel syndrome     Hyperlipidemia     Hypertension     Hypothyroidism     IBS (irritable bowel syndrome)        Past Surgical History:   Procedure Laterality Date    CARDIAC CATHETERIZATION      Dr. Beatty    CARPAL TUNNEL RELEASE Left     Dr. Byers     SECTION  1996    x 1    HYSTERECTOMY      LASER ABLATION Left 2019    Left GSV Laser Ablation performed by Dr. Juan Yang    LASER ABLATION Left 09/10/2021    Procedure: Left distal GSV Remnant Laser Ablation;  Surgeon: Ed Yang DO;  Location: [facility];  Service: Vascular;  Laterality: Left;    Left Ilioinguinal NB Left     DR MCPHERSON    OOPHORECTOMY      TOTAL ABDOMINAL HYSTERECTOMY      Dr. Christine       Social History  Ms.  reports that she has never smoked. She has never been exposed to tobacco smoke. She has never used smokeless tobacco. She reports that she does not currently use alcohol. She reports that she does not use drugs.    Family History  Ms.'s family history includes Hypertension in her mother; No Known Problems in her brother, father, and sister.    Medications and Allergies     Medications  Outpatient Medications Marked as Taking for the 24 encounter (Office Visit) with Alvina Shi DNP  "  Medication Sig Dispense Refill    aspirin (ECOTRIN) 81 MG EC tablet Take 1 tablet (81 mg total) by mouth once daily. 90 tablet 1    cloNIDine (CATAPRES) 0.2 MG tablet Take 1 tablet (0.2 mg total) by mouth every evening. 90 tablet 1    diclofenac (CATAFLAM) 50 MG tablet Take 1 tablet (50 mg total) by mouth 2 (two) times daily. 60 tablet 5    ergocalciferol (ERGOCALCIFEROL) 50,000 unit Cap Take 1 capsule (50,000 Units total) by mouth every 7 days. 12 capsule 1    furosemide (LASIX) 20 MG tablet Take 1 tablet (20 mg total) by mouth once daily. 90 tablet 1    levothyroxine (SYNTHROID) 25 MCG tablet Take 1 tablet (25 mcg total) by mouth before breakfast. 90 tablet 1    linaCLOtide (LINZESS) 290 mcg Cap capsule Take 1 capsule (290 mcg total) by mouth once daily. 30 capsule 1    olmesartan (BENICAR) 5 MG Tab Take 2 tablets (10 mg total) by mouth once daily. 180 tablet 1    pravastatin (PRAVACHOL) 40 MG tablet Take 1 tablet (40 mg total) by mouth every evening. 90 tablet 1     Current Facility-Administered Medications for the 12/17/24 encounter (Office Visit) with Alvina Shi DNP   Medication Dose Route Frequency Provider Last Rate Last Admin    [COMPLETED] betamethasone acetate-betamethasone sodium phosphate injection 6 mg  6 mg Intramuscular 1 time in Clinic/HOD Alvina Shi DNP   6 mg at 12/17/24 0920    [COMPLETED] ketorolac injection 60 mg  60 mg Intramuscular 1 time in Clinic/HOD Alvina Shi DNP   60 mg at 12/17/24 0920       Allergies  Review of patient's allergies indicates:  No Known Allergies    Physical Examination   /82 (BP Location: Right arm, Patient Position: Sitting)   Pulse 75   Temp 98 °F (36.7 °C)   Ht 5' 5" (1.651 m)   Wt 111.8 kg (246 lb 6.4 oz)   SpO2 100%   BMI 41.00 kg/m²    Physical Exam  Vitals and nursing note reviewed.   Constitutional:       Appearance: Normal appearance. She is obese.   HENT:      Head: Normocephalic.      Nose: Nose normal.      " Mouth/Throat:      Mouth: Mucous membranes are moist.   Eyes:      Extraocular Movements: Extraocular movements intact.      Conjunctiva/sclera: Conjunctivae normal.      Pupils: Pupils are equal, round, and reactive to light.   Cardiovascular:      Rate and Rhythm: Normal rate and regular rhythm.      Pulses: Normal pulses.      Heart sounds: Normal heart sounds.   Pulmonary:      Effort: Pulmonary effort is normal.      Breath sounds: Normal breath sounds.   Abdominal:      General: Abdomen is flat. Bowel sounds are decreased.      Palpations: Abdomen is soft.   Musculoskeletal:         General: Swelling and tenderness present.      Cervical back: Normal range of motion and neck supple.      Left lower leg: Edema present.      Comments: Varicose LLE   Lt knee    Skin:     General: Skin is warm and dry.      Capillary Refill: Capillary refill takes less than 2 seconds.   Neurological:      General: No focal deficit present.      Mental Status: She is alert and oriented to person, place, and time. Mental status is at baseline.   Psychiatric:         Mood and Affect: Mood normal.         Behavior: Behavior normal.         Thought Content: Thought content normal.         Judgment: Judgment normal.        Assessment and Plan (including Health Maintenance)      Problem List  Smart Light Up [place]  Document Outside HM   :    Plan:         There are no preventive care reminders to display for this patient.    Problem List Items Addressed This Visit          Endocrine    BMI 38.0-38.9,adult       Orthopedic    Left hip pain    Pain and swelling of left knee    Primary osteoarthritis of left knee - Primary       Health Maintenance Topics with due status: Not Due       Topic Last Completion Date    TETANUS VACCINE 03/06/2023    Colorectal Cancer Screening 07/10/2024    Mammogram 09/26/2024    Shingles Vaccine 11/01/2024    Hemoglobin A1c (Prediabetes) 11/01/2024    Lipid Panel 11/01/2024    RSV Vaccine (Age 60+ and Pregnant patients)  Not Due       Future Appointments   Date Time Provider Department Center   4/7/2025 10:00 AM Alvina Shi DNP [facility]   7/10/2025  7:00 AM [facility] GI ROOM 01 [facility]   10/2/2025 10:20 AM [facility]            Signature:  Alvina Shi DNP      [address] [place] [address]    Date of encounter: 12/17/24

## 2025-02-05 ENCOUNTER — OFFICE VISIT (OUTPATIENT)
Dept: FAMILY MEDICINE | Facility: CLINIC | Age: 50
End: 2025-02-05
Payer: MEDICAID

## 2025-02-05 VITALS
HEIGHT: 65 IN | DIASTOLIC BLOOD PRESSURE: 84 MMHG | SYSTOLIC BLOOD PRESSURE: 126 MMHG | WEIGHT: 251 LBS | HEART RATE: 85 BPM | BODY MASS INDEX: 41.82 KG/M2 | OXYGEN SATURATION: 99 %

## 2025-02-05 DIAGNOSIS — I10 HYPERTENSION, UNSPECIFIED TYPE: ICD-10-CM

## 2025-02-05 DIAGNOSIS — I87.2 VENOUS INSUFFICIENCY: ICD-10-CM

## 2025-02-05 DIAGNOSIS — N62 BREAST HYPERTROPHY: ICD-10-CM

## 2025-02-05 DIAGNOSIS — Z23 NEED FOR VACCINATION: ICD-10-CM

## 2025-02-05 DIAGNOSIS — E78.5 HYPERLIPIDEMIA, UNSPECIFIED HYPERLIPIDEMIA TYPE: ICD-10-CM

## 2025-02-05 DIAGNOSIS — E03.9 HYPOTHYROIDISM, UNSPECIFIED TYPE: ICD-10-CM

## 2025-02-05 DIAGNOSIS — N64.4 BREAST TENDERNESS IN FEMALE: Primary | ICD-10-CM

## 2025-02-05 PROCEDURE — 99213 OFFICE O/P EST LOW 20 MIN: CPT | Mod: ,,, | Performed by: NURSE PRACTITIONER

## 2025-02-05 PROCEDURE — 3008F BODY MASS INDEX DOCD: CPT | Mod: ,,, | Performed by: NURSE PRACTITIONER

## 2025-02-05 PROCEDURE — 90750 HZV VACC RECOMBINANT IM: CPT | Mod: ,,, | Performed by: NURSE PRACTITIONER

## 2025-02-05 PROCEDURE — 3074F SYST BP LT 130 MM HG: CPT | Mod: ,,, | Performed by: NURSE PRACTITIONER

## 2025-02-05 RX ORDER — PRAVASTATIN SODIUM 40 MG/1
40 TABLET ORAL NIGHTLY
Qty: 90 TABLET | Refills: 1 | Status: SHIPPED | OUTPATIENT
Start: 2025-02-05

## 2025-02-05 RX ORDER — DICLOFENAC POTASSIUM 50 MG/1
50 TABLET, FILM COATED ORAL 2 TIMES DAILY
Qty: 60 TABLET | Refills: 5 | Status: SHIPPED | OUTPATIENT
Start: 2025-02-05

## 2025-02-05 RX ORDER — FUROSEMIDE 20 MG/1
20 TABLET ORAL DAILY
Qty: 90 TABLET | Refills: 1 | Status: SHIPPED | OUTPATIENT
Start: 2025-02-05

## 2025-02-05 RX ORDER — OLMESARTAN MEDOXOMIL 20 MG/1
TABLET ORAL
COMMUNITY
Start: 2025-01-21 | End: 2025-02-05 | Stop reason: SDUPTHER

## 2025-02-05 RX ORDER — CLONIDINE HYDROCHLORIDE 0.2 MG/1
0.2 TABLET ORAL NIGHTLY
Qty: 90 TABLET | Refills: 1 | Status: SHIPPED | OUTPATIENT
Start: 2025-02-05

## 2025-02-05 RX ORDER — OLMESARTAN MEDOXOMIL 20 MG/1
20 TABLET ORAL DAILY
Qty: 90 TABLET | Refills: 1 | Status: SHIPPED | OUTPATIENT
Start: 2025-02-05

## 2025-02-05 RX ORDER — SULFAMETHOXAZOLE AND TRIMETHOPRIM 800; 160 MG/1; MG/1
1 TABLET ORAL 2 TIMES DAILY
Qty: 14 TABLET | Refills: 0 | Status: SHIPPED | OUTPATIENT
Start: 2025-02-05 | End: 2025-02-12

## 2025-02-05 RX ORDER — ERGOCALCIFEROL 1.25 MG/1
50000 CAPSULE ORAL
Qty: 12 CAPSULE | Refills: 1 | Status: SHIPPED | OUTPATIENT
Start: 2025-02-05

## 2025-02-05 RX ORDER — LEVOTHYROXINE SODIUM 25 UG/1
25 TABLET ORAL
Qty: 90 TABLET | Refills: 1 | Status: SHIPPED | OUTPATIENT
Start: 2025-02-05

## 2025-02-05 RX ORDER — TIZANIDINE 4 MG/1
4 TABLET ORAL NIGHTLY PRN
Qty: 90 TABLET | Refills: 1 | Status: SHIPPED | OUTPATIENT
Start: 2025-02-05

## 2025-02-05 RX ORDER — INDOMETHACIN 50 MG/1
50 CAPSULE ORAL 3 TIMES DAILY
Qty: 30 CAPSULE | Refills: 0 | Status: SHIPPED | OUTPATIENT
Start: 2025-02-05

## 2025-02-05 NOTE — PROGRESS NOTES
Alvina Shi DNP   [address] [place] [address]     PATIENT NAME: Maury Mckenzie  : 1975  DATE: 25  MRN: [id number]      Billing Provider: Alvina Shi DNP  Level of Service:   Patient PCP Information       Provider PCP Type    Alvina Shi DNP General            Reason for Visit / Chief Complaint: Breast Pain (C/o Left breast pain x few weeks - not getting worse, but not better-comes and goes) and Swelling (BLE swelling x few months )       Update PCP  Update Chief Complaint         History of Present Illness / Problem Focused Workflow     Maury Mckenzie presents to the clinic with Breast Pain (C/o Left breast pain x few weeks - not getting worse, but not better-comes and goes) and Swelling (BLE swelling x few months )     Swelling    Review of Systems     Review of Systems     Medical / Social / Family History     Past Medical History:   Diagnosis Date    Carpal tunnel syndrome     Hyperlipidemia     Hypertension     Hypothyroidism     IBS (irritable bowel syndrome)        Past Surgical History:   Procedure Laterality Date    CARDIAC CATHETERIZATION      Dr. Beatty    CARPAL TUNNEL RELEASE Left     Dr. Byers     SECTION  1996    x 1    HYSTERECTOMY      LASER ABLATION Left 2019    Left GSV Laser Ablation performed by Dr. Juan Yang    LASER ABLATION Left 09/10/2021    Procedure: Left distal GSV Remnant Laser Ablation;  Surgeon: Ed Yang DO;  Location: [facility];  Service: Vascular;  Laterality: Left;    Left Ilioinguinal NB Left     DR MCPHERSON    OOPHORECTOMY      TOTAL ABDOMINAL HYSTERECTOMY      Dr. Christine       Social History  Ms.  reports that she has never smoked. She has never been exposed to tobacco smoke. She has never used smokeless tobacco. She reports that she does not currently use alcohol. She reports that she does not use drugs.    Family History  Ms.'s family history includes Hypertension in her  mother; No Known Problems in her brother, father, and sister.    Medications and Allergies     Medications  Outpatient Medications Marked as Taking for the 2/5/25 encounter (Office Visit) with Alvina Shi DNP   Medication Sig Dispense Refill    aspirin (ECOTRIN) 81 MG EC tablet Take 1 tablet (81 mg total) by mouth once daily. 90 tablet 1    colchicine (COLCRYS) 0.6 mg tablet 2 tablets by mouth on day 1, then daily x1 week 8 tablet 0    [DISCONTINUED] cloNIDine (CATAPRES) 0.2 MG tablet Take 1 tablet (0.2 mg total) by mouth every evening. 90 tablet 1    [DISCONTINUED] diclofenac (CATAFLAM) 50 MG tablet Take 1 tablet (50 mg total) by mouth 2 (two) times daily. 60 tablet 5    [DISCONTINUED] ergocalciferol (ERGOCALCIFEROL) 50,000 unit Cap Take 1 capsule (50,000 Units total) by mouth every 7 days. 12 capsule 1    [DISCONTINUED] furosemide (LASIX) 20 MG tablet Take 1 tablet (20 mg total) by mouth once daily. 90 tablet 1    [DISCONTINUED] indomethacin (INDOCIN) 50 MG capsule Take 1 capsule (50 mg total) by mouth 3 (three) times daily. 30 capsule 0    [DISCONTINUED] levothyroxine (SYNTHROID) 25 MCG tablet Take 1 tablet (25 mcg total) by mouth before breakfast. 90 tablet 1    [DISCONTINUED] linaCLOtide (LINZESS) 290 mcg Cap capsule Take 1 capsule (290 mcg total) by mouth once daily. 30 capsule 1    [DISCONTINUED] olmesartan (BENICAR) 20 MG tablet Take by mouth.      [DISCONTINUED] pravastatin (PRAVACHOL) 40 MG tablet Take 1 tablet (40 mg total) by mouth every evening. 90 tablet 1    [DISCONTINUED] tiZANidine (ZANAFLEX) 4 MG tablet Take 1 tablet (4 mg total) by mouth nightly as needed (muscle spasms). 90 tablet 1     Current Facility-Administered Medications for the 2/5/25 encounter (Office Visit) with Alvina Shi DNP   Medication Dose Route Frequency Provider Last Rate Last Admin    [COMPLETED] varicella zoster (Shingrix) IM vaccine (>/= 49 yo)  0.5 mL Intramuscular 1 time in Clinic/HOD Jose Gaudalupe  "Alvina PIEDRAZE   0.5 mL at 02/05/25 1415       Allergies  Review of patient's allergies indicates:  No Known Allergies    Physical Examination   /84 (BP Location: Right arm, Patient Position: Sitting)   Pulse 85   Ht 5' 5" (1.651 m)   Wt 113.9 kg (251 lb)   SpO2 99%   BMI 41.77 kg/m²    Physical Exam  Vitals and nursing note reviewed.   Constitutional:       Appearance: Normal appearance. She is obese.   HENT:      Head: Normocephalic.      Nose: Nose normal.      Mouth/Throat:      Mouth: Mucous membranes are moist.   Eyes:      Extraocular Movements: Extraocular movements intact.      Conjunctiva/sclera: Conjunctivae normal.      Pupils: Pupils are equal, round, and reactive to light.   Cardiovascular:      Rate and Rhythm: Normal rate and regular rhythm.      Pulses: Normal pulses.      Heart sounds: Normal heart sounds.   Pulmonary:      Effort: Pulmonary effort is normal.      Breath sounds: Normal breath sounds.   Chest:      Chest wall: Swelling, tenderness and edema present. No mass or lacerations.   Breasts:     Left: Swelling and tenderness present. No bleeding, inverted nipple, mass, nipple discharge or skin change.          Comments: Erythematous   Abdominal:      General: Abdomen is flat. Bowel sounds are decreased.      Palpations: Abdomen is soft.   Musculoskeletal:      Cervical back: Normal range of motion and neck supple.      Comments: Varicose LLE    Skin:     General: Skin is warm and dry.      Capillary Refill: Capillary refill takes less than 2 seconds.   Neurological:      General: No focal deficit present.      Mental Status: She is alert and oriented to person, place, and time. Mental status is at baseline.   Psychiatric:         Mood and Affect: Mood normal.         Behavior: Behavior normal.         Thought Content: Thought content normal.         Judgment: Judgment normal.        Assessment and Plan (including Health Maintenance)      Problem List  Smart Sets  Document Outside " HM   :    Plan:         Health Maintenance Due   Topic Date Due    Pneumococcal Vaccines (Age 50+) (1 of 1 - PCV) Never done       Problem List Items Addressed This Visit          Cardiac/Vascular    Hyperlipidemia    Relevant Medications    pravastatin (PRAVACHOL) 40 MG tablet    Hypertension    Relevant Medications    cloNIDine (CATAPRES) 0.2 MG tablet    furosemide (LASIX) 20 MG tablet    Venous insufficiency       Renal/    Breast hypertrophy    Relevant Orders    Mammo Digital Diagnostic Left with Skyler    US Breast Bilateral Complete    Breast tenderness in female - Primary    Relevant Orders    Mammo Digital Diagnostic Left with Skyler    US Breast Bilateral Complete       ID    Need for vaccination    Relevant Medications    varicella zoster (Shingrix) IM vaccine (>/= 51 yo) (Completed)       Endocrine    BMI 38.0-38.9,adult    Hypothyroidism    Relevant Medications    levothyroxine (SYNTHROID) 25 MCG tablet       Health Maintenance Topics with due status: Not Due       Topic Last Completion Date    TETANUS VACCINE 03/06/2023    Colorectal Cancer Screening 07/10/2024    Mammogram 09/26/2024    Hemoglobin A1c (Prediabetes) 11/01/2024    Lipid Panel 11/01/2024    RSV Vaccine (Age 60+ and Pregnant patients) Not Due       Future Appointments   Date Time Provider Department Center   2/24/2025  3:40 PM [facility] US2 [facility]   5/7/2025  9:15 AM Alvina Shi DNP [facility]   7/10/2025  7:00 AM [facility] GI ROOM 01 [facility]   10/2/2025 10:20 AM [facility]            Signature:  Alvina Shi DNP      [address] [place] [address]    Date of encounter: 2/5/25

## 2025-02-24 ENCOUNTER — OFFICE VISIT (OUTPATIENT)
Dept: FAMILY MEDICINE | Facility: CLINIC | Age: 50
End: 2025-02-24
Payer: MEDICAID

## 2025-02-24 VITALS
BODY MASS INDEX: 41.32 KG/M2 | OXYGEN SATURATION: 99 % | WEIGHT: 248 LBS | TEMPERATURE: 99 F | DIASTOLIC BLOOD PRESSURE: 76 MMHG | HEIGHT: 65 IN | SYSTOLIC BLOOD PRESSURE: 125 MMHG | HEART RATE: 80 BPM | RESPIRATION RATE: 20 BRPM

## 2025-02-24 DIAGNOSIS — R50.9 FEVER, UNSPECIFIED FEVER CAUSE: ICD-10-CM

## 2025-02-24 DIAGNOSIS — I10 PRIMARY HYPERTENSION: ICD-10-CM

## 2025-02-24 DIAGNOSIS — R05.1 ACUTE COUGH: ICD-10-CM

## 2025-02-24 DIAGNOSIS — R05.9 COUGH, UNSPECIFIED TYPE: ICD-10-CM

## 2025-02-24 DIAGNOSIS — J11.1 INFLUENZA: Primary | ICD-10-CM

## 2025-02-24 DIAGNOSIS — I87.2 VENOUS INSUFFICIENCY: ICD-10-CM

## 2025-02-24 DIAGNOSIS — R06.2 WHEEZING: ICD-10-CM

## 2025-02-24 LAB
CTP QC/QA: YES
CTP QC/QA: YES
POC MOLECULAR INFLUENZA A AGN: POSITIVE
POC MOLECULAR INFLUENZA B AGN: NEGATIVE
SARS-COV-2 RDRP RESP QL NAA+PROBE: NEGATIVE

## 2025-02-24 PROCEDURE — 99213 OFFICE O/P EST LOW 20 MIN: CPT | Mod: ,,, | Performed by: NURSE PRACTITIONER

## 2025-02-24 PROCEDURE — 3074F SYST BP LT 130 MM HG: CPT | Mod: ,,, | Performed by: NURSE PRACTITIONER

## 2025-02-24 PROCEDURE — 3008F BODY MASS INDEX DOCD: CPT | Mod: ,,, | Performed by: NURSE PRACTITIONER

## 2025-02-24 PROCEDURE — 87502 INFLUENZA DNA AMP PROBE: CPT | Mod: QW,,, | Performed by: NURSE PRACTITIONER

## 2025-02-24 PROCEDURE — 87635 SARS-COV-2 COVID-19 AMP PRB: CPT | Mod: QW,,, | Performed by: NURSE PRACTITIONER

## 2025-02-24 PROCEDURE — 3078F DIAST BP <80 MM HG: CPT | Mod: ,,, | Performed by: NURSE PRACTITIONER

## 2025-02-24 RX ORDER — AZITHROMYCIN 250 MG/1
TABLET, FILM COATED ORAL
Qty: 6 TABLET | Refills: 0 | Status: SHIPPED | OUTPATIENT
Start: 2025-02-24

## 2025-02-24 RX ORDER — METHYLPREDNISOLONE 4 MG/1
TABLET ORAL
Qty: 21 TABLET | Refills: 0 | Status: SHIPPED | OUTPATIENT
Start: 2025-02-24

## 2025-02-24 RX ORDER — OSELTAMIVIR PHOSPHATE 75 MG/1
75 CAPSULE ORAL 2 TIMES DAILY
Qty: 10 CAPSULE | Refills: 0 | Status: SHIPPED | OUTPATIENT
Start: 2025-02-24 | End: 2025-03-01

## 2025-02-24 NOTE — PROGRESS NOTES
Alvina Shi DNP   [address] [place] [address]     PATIENT NAME: Maury Mckenzie  : 1975  DATE: 25  MRN: [id number]      Billing Provider: Alvina Shi DNP  Level of Service:   Patient PCP Information       Provider PCP Type    Alvina Shi DNP General            Reason for Visit / Chief Complaint: Cough, Nasal Congestion, Generalized Body Aches, and Chills       Update PCP  Update Chief Complaint         History of Present Illness / Problem Focused Workflow     Maury Mckenzie presents to the clinic with Cough, Nasal Congestion, Generalized Body Aches, and Chills     Cough    Review of Systems     Review of Systems   Respiratory:  Positive for cough.       Medical / Social / Family History     Past Medical History:   Diagnosis Date    Carpal tunnel syndrome     Hyperlipidemia     Hypertension     Hypothyroidism     IBS (irritable bowel syndrome)        Past Surgical History:   Procedure Laterality Date    CARDIAC CATHETERIZATION      Dr. Beatty    CARPAL TUNNEL RELEASE Left     Dr. Byers     SECTION  1996    x 1    HYSTERECTOMY      LASER ABLATION Left 2019    Left GSV Laser Ablation performed by Dr. Juan Yang    LASER ABLATION Left 09/10/2021    Procedure: Left distal GSV Remnant Laser Ablation;  Surgeon: Ed Yang DO;  Location: [facility];  Service: Vascular;  Laterality: Left;    Left Ilioinguinal NB Left     DR MCPHERSON    OOPHORECTOMY      TOTAL ABDOMINAL HYSTERECTOMY      Dr. Christine       Social History  Ms.  reports that she has never smoked. She has never been exposed to tobacco smoke. She has never used smokeless tobacco. She reports that she does not currently use alcohol. She reports that she does not use drugs.    Family History  Ms.'s family history includes Hypertension in her mother; No Known Problems in her brother, father, and sister.    Medications and Allergies     Medications  No outpatient  "medications have been marked as taking for the 2/24/25 encounter (Office Visit) with Alvina Shi DNP.       Allergies  Review of patient's allergies indicates:  No Known Allergies    Physical Examination   /76 (BP Location: Left arm, Patient Position: Sitting)   Pulse 80   Temp 98.9 °F (37.2 °C) (Oral)   Resp 20   Ht 5' 5" (1.651 m)   Wt 112.5 kg (248 lb)   SpO2 99%   BMI 41.27 kg/m²    Physical Exam  Vitals and nursing note reviewed.   Constitutional:       Appearance: Normal appearance. She is obese. She is ill-appearing.   HENT:      Head: Normocephalic.      Nose: Congestion and rhinorrhea present.      Mouth/Throat:      Mouth: Mucous membranes are moist.      Pharynx: Posterior oropharyngeal erythema present.   Eyes:      Extraocular Movements: Extraocular movements intact.      Conjunctiva/sclera: Conjunctivae normal.      Pupils: Pupils are equal, round, and reactive to light.   Cardiovascular:      Rate and Rhythm: Normal rate and regular rhythm.      Pulses: Normal pulses.      Heart sounds: Normal heart sounds.   Pulmonary:      Effort: Pulmonary effort is normal.      Breath sounds: Wheezing present.   Chest:      Chest wall: Tenderness present.   Abdominal:      General: Abdomen is flat. Bowel sounds are decreased.      Palpations: Abdomen is soft.      Tenderness: There is abdominal tenderness.   Musculoskeletal:         General: Normal range of motion.      Cervical back: Normal range of motion and neck supple.      Comments: Varicose LLE    Lymphadenopathy:      Cervical: Cervical adenopathy present.   Skin:     General: Skin is warm and dry.      Capillary Refill: Capillary refill takes less than 2 seconds.      Findings: Lesion and rash present.   Neurological:      General: No focal deficit present.      Mental Status: She is alert and oriented to person, place, and time. Mental status is at baseline.   Psychiatric:         Mood and Affect: Mood normal.         Behavior: " Behavior normal.         Thought Content: Thought content normal.         Judgment: Judgment normal.        Assessment and Plan (including Health Maintenance)      Problem List  Smart Sets  Document Outside HM   :    Plan:         Health Maintenance Due   Topic Date Due    Pneumococcal Vaccines (Age 50+) (1 of 1 - PCV) Never done       Problem List Items Addressed This Visit          Pulmonary    Cough    Relevant Orders    POCT Influenza A/B Molecular (Completed)    POCT COVID-19 Rapid Screening (Completed)    Wheezing       Cardiac/Vascular    Hypertension    Venous insufficiency       ID    Influenza - Primary       Other    Fever       Health Maintenance Topics with due status: Not Due       Topic Last Completion Date    TETANUS VACCINE 03/06/2023    Colorectal Cancer Screening 07/10/2024    Mammogram 09/26/2024    Hemoglobin A1c (Prediabetes) 11/01/2024    Lipid Panel 11/01/2024    RSV Vaccine (Age 60+ and Pregnant patients) Not Due       Future Appointments   Date Time Provider Department Center   2/24/2025  3:40 PM [facility] US2 [facility] Women[facility]   5/7/2025  9:15 AM Alvina Shi DNP [facility]   7/10/2025  7:00 AM [facility] GI ROOM 01 [facility]   10/2/2025 10:20 AM [facility]            Signature:  Alvina Shi DNP      [address] [place] [address]    Date of encounter: 2/24/25

## 2025-03-19 DIAGNOSIS — N64.4 BREAST TENDERNESS IN FEMALE: Primary | ICD-10-CM

## 2025-03-19 DIAGNOSIS — N62 BREAST HYPERTROPHY: Primary | ICD-10-CM

## 2025-03-19 DIAGNOSIS — N62 BREAST HYPERTROPHY: ICD-10-CM

## 2025-03-19 DIAGNOSIS — N64.4 BREAST TENDERNESS IN FEMALE: ICD-10-CM

## 2025-04-11 ENCOUNTER — OFFICE VISIT (OUTPATIENT)
Dept: FAMILY MEDICINE | Facility: CLINIC | Age: 50
End: 2025-04-11
Payer: MEDICAID

## 2025-04-11 VITALS
BODY MASS INDEX: 41.79 KG/M2 | TEMPERATURE: 98 F | OXYGEN SATURATION: 98 % | HEART RATE: 98 BPM | SYSTOLIC BLOOD PRESSURE: 132 MMHG | WEIGHT: 250.81 LBS | HEIGHT: 65 IN | DIASTOLIC BLOOD PRESSURE: 86 MMHG

## 2025-04-11 DIAGNOSIS — M79.661 PAIN AND SWELLING OF RIGHT LOWER LEG: ICD-10-CM

## 2025-04-11 DIAGNOSIS — S80.11XA HEMATOMA OF RIGHT LOWER LEG: ICD-10-CM

## 2025-04-11 DIAGNOSIS — I87.2 VENOUS INSUFFICIENCY: ICD-10-CM

## 2025-04-11 DIAGNOSIS — M79.89 PAIN AND SWELLING OF RIGHT LOWER LEG: ICD-10-CM

## 2025-04-11 DIAGNOSIS — R60.0 EDEMA, LOWER EXTREMITY: ICD-10-CM
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
## 2025-04-11 PROBLEM — M70.22 OLECRANON BURSITIS OF LEFT ELBOW: Status: RESOLVED | Noted: 2023-07-19 | Resolved: 2025-04-11

## 2025-04-11 PROBLEM — M25.462 PAIN AND SWELLING OF LEFT KNEE: Status: RESOLVED | Noted: 2023-09-14 | Resolved: 2025-04-11

## 2025-04-11 PROBLEM — U07.1 COVID-19: Status: RESOLVED | Noted: 2021-12-30 | Resolved: 2025-04-11

## 2025-04-11 PROBLEM — L02.211 ABDOMINAL WALL ABSCESS: Status: RESOLVED | Noted: 2023-03-06 | Resolved: 2025-04-11

## 2025-04-11 PROBLEM — M25.572 ACUTE LEFT ANKLE PAIN: Status: RESOLVED | Noted: 2023-06-05 | Resolved: 2025-04-11

## 2025-04-11 PROBLEM — R50.9 FEVER: Status: RESOLVED | Noted: 2025-02-24 | Resolved: 2025-04-11

## 2025-04-11 PROBLEM — M79.605 PAIN OF LEFT LOWER EXTREMITY: Status: RESOLVED | Noted: 2021-07-07 | Resolved: 2025-04-11

## 2025-04-11 PROBLEM — R10.9 ABDOMINAL PAIN: Status: RESOLVED | Noted: 2021-11-16 | Resolved: 2025-04-11

## 2025-04-11 PROBLEM — R06.2 WHEEZING: Status: RESOLVED | Noted: 2025-02-24 | Resolved: 2025-04-11

## 2025-04-11 PROBLEM — J11.1 INFLUENZA: Status: RESOLVED | Noted: 2025-02-24 | Resolved: 2025-04-11

## 2025-04-11 PROBLEM — L02.31 ABSCESS OF BUTTOCK, RIGHT: Status: RESOLVED | Noted: 2023-05-23 | Resolved: 2025-04-11

## 2025-04-11 PROBLEM — M25.552 LEFT HIP PAIN: Status: RESOLVED | Noted: 2024-04-08 | Resolved: 2025-04-11

## 2025-04-11 PROBLEM — M79.10 MYALGIA: Status: RESOLVED | Noted: 2021-12-29 | Resolved: 2025-04-11

## 2025-04-11 PROBLEM — J32.9 SINUSITIS: Status: RESOLVED | Noted: 2023-04-20 | Resolved: 2025-04-11

## 2025-04-11 PROBLEM — R05.9 COUGH: Status: RESOLVED | Noted: 2021-12-30 | Resolved: 2025-04-11

## 2025-04-11 PROBLEM — H92.03 ACUTE OTALGIA, BILATERAL: Status: RESOLVED | Noted: 2022-04-05 | Resolved: 2025-04-11

## 2025-04-11 PROBLEM — Z12.11 SCREENING FOR COLON CANCER: Status: RESOLVED | Noted: 2023-03-07 | Resolved: 2025-04-11

## 2025-04-11 PROBLEM — N89.8 VAGINAL DISCHARGE: Status: RESOLVED | Noted: 2022-09-07 | Resolved: 2025-04-11

## 2025-04-11 PROBLEM — R60.9 EDEMA: Status: RESOLVED | Noted: 2021-08-09 | Resolved: 2025-04-11

## 2025-04-11 PROBLEM — N64.4 BREAST TENDERNESS IN FEMALE: Status: RESOLVED | Noted: 2025-02-05 | Resolved: 2025-04-11

## 2025-04-11 PROBLEM — R07.81 RIB PAIN ON LEFT SIDE: Status: RESOLVED | Noted: 2021-11-16 | Resolved: 2025-04-11

## 2025-04-11 PROBLEM — M25.522 LEFT ELBOW PAIN: Status: RESOLVED | Noted: 2023-07-19 | Resolved: 2025-04-11

## 2025-04-11 PROBLEM — R23.8 OTHER SKIN CHANGES: Status: RESOLVED | Noted: 2021-07-07 | Resolved: 2025-04-11

## 2025-04-11 PROBLEM — R22.42 LOCALIZED SWELLING OF LEFT FOOT: Status: RESOLVED | Noted: 2023-05-23 | Resolved: 2025-04-11

## 2025-04-11 PROBLEM — Z11.59 SCREENING FOR VIRAL DISEASE: Status: RESOLVED | Noted: 2021-12-06 | Resolved: 2025-04-11

## 2025-04-11 PROBLEM — J40 BRONCHITIS: Status: RESOLVED | Noted: 2023-09-06 | Resolved: 2025-04-11

## 2025-04-11 PROBLEM — S93.402A SPRAIN OF LEFT ANKLE: Status: RESOLVED | Noted: 2023-06-05 | Resolved: 2025-04-11

## 2025-04-11 PROBLEM — M25.562 PAIN AND SWELLING OF LEFT KNEE: Status: RESOLVED | Noted: 2023-09-14 | Resolved: 2025-04-11

## 2025-04-11 PROBLEM — Z23 NEED FOR VACCINATION: Status: RESOLVED | Noted: 2024-11-01 | Resolved: 2025-04-11

## 2025-04-11 PROBLEM — S29.011A CHEST WALL MUSCLE STRAIN: Status: RESOLVED | Noted: 2021-11-16 | Resolved: 2025-04-11

## 2025-04-11 PROBLEM — R73.9 HYPERGLYCEMIA: Status: RESOLVED | Noted: 2021-12-06 | Resolved: 2025-04-11

## 2025-04-11 PROBLEM — K59.00 CONSTIPATION: Status: RESOLVED | Noted: 2021-12-06 | Resolved: 2025-04-11

## 2025-04-11 PROBLEM — M79.672 PAIN IN LEFT FOOT: Status: RESOLVED | Noted: 2022-03-07 | Resolved: 2025-04-11

## 2025-04-11 PROBLEM — M19.90 ARTHRITIS: Status: RESOLVED | Noted: 2021-09-01 | Resolved: 2025-04-11

## 2025-04-11 NOTE — PROGRESS NOTES
Alvina Shi DNP   [address] [place] [address]     PATIENT NAME: Maury Mckenzie  : 1975  DATE: 25  MRN: [id number]      Billing Provider: Alvina Shi DNP  Level of Service:   Patient PCP Information       Provider PCP Type    Alvina Shi DNP General            Reason for Visit / Chief Complaint: Fall and Leg Pain (Reports fell on Wednesday- got tripped up in the bedroom)       Update PCP  Update Chief Complaint         History of Present Illness / Problem Focused Workflow     Maury Mckenzie presents to the clinic with Fall and Leg Pain (Reports fell on Wednesday- got tripped up in the bedroom)     Fall    Leg Pain         Review of Systems     Review of Systems   Musculoskeletal:  Positive for leg pain.        Medical / Social / Family History     Past Medical History:   Diagnosis Date    Carpal tunnel syndrome     Hyperlipidemia     Hypertension     Hypothyroidism     IBS (irritable bowel syndrome)        Past Surgical History:   Procedure Laterality Date    CARDIAC CATHETERIZATION      Dr. Beatty    CARPAL TUNNEL RELEASE Left     Dr. Byers     SECTION  1996    x 1    HYSTERECTOMY      LASER ABLATION Left 2019    Left GSV Laser Ablation performed by Dr. Juan Yang    LASER ABLATION Left 09/10/2021    Procedure: Left distal GSV Remnant Laser Ablation;  Surgeon: Ed Yang DO;  Location: [facility];  Service: Vascular;  Laterality: Left;    Left Ilioinguinal NB Left     DR MCPHERSON    OOPHORECTOMY      TOTAL ABDOMINAL HYSTERECTOMY      Dr. Christine       Social History  Ms.  reports that she has never smoked. She has never been exposed to tobacco smoke. She has never used smokeless tobacco. She reports that she does not currently use alcohol. She reports that she does not use drugs.    Family History  Ms.'s family history includes Hypertension in her mother; No Known Problems in her brother, father, and sister.    Medications and  "Allergies     Medications  Outpatient Medications Marked as Taking for the 4/11/25 encounter (Office Visit) with Alvina Shi, ZE   Medication Sig Dispense Refill    aspirin (ECOTRIN) 81 MG EC tablet Take 1 tablet (81 mg total) by mouth once daily. 90 tablet 1    cloNIDine (CATAPRES) 0.2 MG tablet Take 1 tablet (0.2 mg total) by mouth every evening. 90 tablet 1    diclofenac (CATAFLAM) 50 MG tablet Take 1 tablet (50 mg total) by mouth 2 (two) times daily. 60 tablet 5    ergocalciferol (ERGOCALCIFEROL) 50,000 unit Cap Take 1 capsule (50,000 Units total) by mouth every 7 days. 12 capsule 1    furosemide (LASIX) 20 MG tablet Take 1 tablet (20 mg total) by mouth once daily. 90 tablet 1    indomethacin (INDOCIN) 50 MG capsule Take 1 capsule (50 mg total) by mouth 3 (three) times daily. 30 capsule 0    levothyroxine (SYNTHROID) 25 MCG tablet Take 1 tablet (25 mcg total) by mouth before breakfast. 90 tablet 1    linaCLOtide (LINZESS) 290 mcg Cap capsule Take 1 capsule (290 mcg total) by mouth once daily. 30 capsule 1    olmesartan (BENICAR) 20 MG tablet Take 1 tablet (20 mg total) by mouth once daily. 90 tablet 1    pravastatin (PRAVACHOL) 40 MG tablet Take 1 tablet (40 mg total) by mouth every evening. 90 tablet 1    tiZANidine (ZANAFLEX) 4 MG tablet Take 1 tablet (4 mg total) by mouth nightly as needed (muscle spasms). 90 tablet 1       Allergies  Review of patient's allergies indicates:  No Known Allergies    Physical Examination   /86 (BP Location: Right arm, Patient Position: Sitting)   Pulse 98   Temp 97.9 °F (36.6 °C)   Ht 5' 5" (1.651 m)   Wt 113.8 kg (250 lb 12.8 oz)   SpO2 98%   BMI 41.74 kg/m²    Physical Exam  Vitals and nursing note reviewed.   Constitutional:       Appearance: Normal appearance. She is obese.   HENT:      Head: Normocephalic.      Nose: Nose normal.      Mouth/Throat:      Mouth: Mucous membranes are moist.   Eyes:      Extraocular Movements: Extraocular movements intact.    "   Conjunctiva/sclera: Conjunctivae normal.      Pupils: Pupils are equal, round, and reactive to light.   Cardiovascular:      Rate and Rhythm: Normal rate and regular rhythm.      Pulses: Normal pulses.      Heart sounds: Normal heart sounds.   Pulmonary:      Effort: Pulmonary effort is normal.      Breath sounds: Normal breath sounds.   Abdominal:      General: Abdomen is flat. Bowel sounds are normal.      Palpations: Abdomen is soft.   Musculoskeletal:         General: Swelling, tenderness and signs of injury present. Normal range of motion.      Cervical back: Normal range of motion and neck supple.      Right lower leg: Edema present.      Comments: Varicose BLE  Large contusion to rt calf extending to side of rt leg. RLE +1,  Tender to touch , swelling and hard lump like area to rt calf from bruising  More than likely hematoma.       Skin:     General: Skin is warm and dry.      Capillary Refill: Capillary refill takes less than 2 seconds.   Neurological:      General: No focal deficit present.      Mental Status: She is alert and oriented to person, place, and time. Mental status is at baseline.      Motor: Weakness present.      Gait: Gait abnormal.   Psychiatric:         Mood and Affect: Mood normal.         Behavior: Behavior normal.         Thought Content: Thought content normal.         Judgment: Judgment normal.          Assessment and Plan (including Health Maintenance)      Problem List  Smart Sets  Document Outside HM   :    Plan:   Wear compression stockings.  Bp stable.  Elevated as needed.  Will send for US RLE      Health Maintenance Due   Topic Date Due    Pneumococcal Vaccines (Age 50+) (1 of 1 - PCV) Never done    Colorectal Cancer Screening  07/10/2025       Problem List Items Addressed This Visit          Cardiac/Vascular    Venous insufficiency       Endocrine    BMI 38.0-38.9,adult       Orthopedic    Fall - Primary    Relevant Orders    US Lower Extremity Veins Right    Hematoma of  right lower leg    Relevant Orders    US Lower Extremity Veins Right    Pain and swelling of right lower leg    Relevant Orders    US Lower Extremity Veins Right       Other    Edema, lower extremity       Health Maintenance Topics with due status: Not Due       Topic Last Completion Date    TETANUS VACCINE 03/06/2023    Mammogram 09/26/2024    Hemoglobin A1c (Prediabetes) 11/01/2024    Lipid Panel 11/01/2024    RSV Vaccine (Age 60+ and Pregnant patients) Not Due       Future Appointments   Date Time Provider Department Center   4/30/2025  1:30 PM [facility] US1 RMOB USIC [facility]   4/30/2025  2:00 PM [facility] MAMMO1 [facility] MAMMO [facility]   4/30/2025  2:20 PM [facility] US2 [facility] US [facility]   5/7/2025  9:15 AM Alvina Shi DNP [facility] [place] [facility]   7/10/2025  7:00 AM [facility] GI ROOM 01 RAS ENDO [facility]   10/2/2025 10:20 AM [facility] MAMMO1 OB MMIC [facility]            Signature:  Alvina Shi DNP      [address] [place] [address]    Date of encounter: 4/11/25

## 2025-04-21 RX ORDER — LINACLOTIDE 290 UG/1
290 CAPSULE, GELATIN COATED ORAL
Qty: 30 CAPSULE | Refills: 1 | Status: SHIPPED | OUTPATIENT
Start: 2025-04-21

## 2025-04-23 ENCOUNTER — OFFICE VISIT (OUTPATIENT)
Dept: FAMILY MEDICINE | Facility: CLINIC | Age: 50
End: 2025-04-23
Payer: MEDICAID

## 2025-04-23 VITALS
HEART RATE: 78 BPM | RESPIRATION RATE: 20 BRPM | DIASTOLIC BLOOD PRESSURE: 83 MMHG | TEMPERATURE: 98 F | OXYGEN SATURATION: 100 % | BODY MASS INDEX: 42.15 KG/M2 | SYSTOLIC BLOOD PRESSURE: 140 MMHG | WEIGHT: 253 LBS | HEIGHT: 65 IN

## 2025-04-23 DIAGNOSIS — M25.462 PAIN AND SWELLING OF LEFT KNEE: Primary | ICD-10-CM

## 2025-04-23 DIAGNOSIS — M25.562 PAIN AND SWELLING OF LEFT KNEE: Primary | ICD-10-CM

## 2025-04-23 DIAGNOSIS — M10.9 ACUTE GOUT OF LEFT KNEE, UNSPECIFIED CAUSE: ICD-10-CM

## 2025-04-23 PROBLEM — M79.89 PAIN AND SWELLING OF RIGHT LOWER LEG: Status: RESOLVED | Noted: 2025-04-11 | Resolved: 2025-04-23

## 2025-04-23 PROBLEM — Z79.899 ON LONG TERM DRUG THERAPY: Status: RESOLVED | Noted: 2023-03-07 | Resolved: 2025-04-23

## 2025-04-23 PROBLEM — N62 BREAST HYPERTROPHY: Status: RESOLVED | Noted: 2025-02-05 | Resolved: 2025-04-23

## 2025-04-23 PROBLEM — S80.11XA HEMATOMA OF RIGHT LOWER LEG: Status: RESOLVED | Noted: 2025-04-11 | Resolved: 2025-04-23
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
## 2025-04-23 PROBLEM — M79.661 PAIN AND SWELLING OF RIGHT LOWER LEG: Status: RESOLVED | Noted: 2025-04-11 | Resolved: 2025-04-23

## 2025-04-23 PROBLEM — R60.0 EDEMA, LOWER EXTREMITY: Status: RESOLVED | Noted: 2021-07-07 | Resolved: 2025-04-23

## 2025-04-23 PROCEDURE — 96372 THER/PROPH/DIAG INJ SC/IM: CPT | Mod: ,,, | Performed by: NURSE PRACTITIONER

## 2025-04-23 PROCEDURE — 99213 OFFICE O/P EST LOW 20 MIN: CPT | Mod: 25,,, | Performed by: NURSE PRACTITIONER

## 2025-04-23 PROCEDURE — 3077F SYST BP >= 140 MM HG: CPT | Mod: ,,, | Performed by: NURSE PRACTITIONER

## 2025-04-23 PROCEDURE — 3008F BODY MASS INDEX DOCD: CPT | Mod: ,,, | Performed by: NURSE PRACTITIONER

## 2025-04-23 RX ORDER — DICLOFENAC SODIUM 50 MG/1
50 TABLET, DELAYED RELEASE ORAL 2 TIMES DAILY
Qty: 60 TABLET | Refills: 1 | Status: SHIPPED | OUTPATIENT
Start: 2025-04-23

## 2025-04-23 RX ORDER — METHYLPREDNISOLONE ACETATE 40 MG/ML
40 INJECTION, SUSPENSION INTRA-ARTICULAR; INTRALESIONAL; INTRAMUSCULAR; SOFT TISSUE
Status: COMPLETED | OUTPATIENT
Start: 2025-04-23 | End: 2025-04-23

## 2025-04-23 RX ORDER — TIZANIDINE 4 MG/1
4 TABLET ORAL NIGHTLY PRN
Qty: 20 TABLET | Refills: 0 | Status: SHIPPED | OUTPATIENT
Start: 2025-04-23

## 2025-04-23 RX ORDER — COLCHICINE 0.6 MG/1
TABLET ORAL
Qty: 8 TABLET | Refills: 0 | Status: SHIPPED | OUTPATIENT
Start: 2025-04-23

## 2025-04-23 RX ORDER — KETOROLAC TROMETHAMINE 30 MG/ML
60 INJECTION, SOLUTION INTRAMUSCULAR; INTRAVENOUS
Status: COMPLETED | OUTPATIENT
Start: 2025-04-23 | End: 2025-04-23

## 2025-04-23 RX ORDER — METHYLPREDNISOLONE 4 MG/1
TABLET ORAL
Qty: 21 TABLET | Refills: 0 | Status: SHIPPED | OUTPATIENT
Start: 2025-04-23

## 2025-04-23 RX ORDER — DEXAMETHASONE SODIUM PHOSPHATE 4 MG/ML
4 INJECTION, SOLUTION INTRA-ARTICULAR; INTRALESIONAL; INTRAMUSCULAR; INTRAVENOUS; SOFT TISSUE
Status: COMPLETED | OUTPATIENT
Start: 2025-04-23 | End: 2025-04-23

## 2025-04-23 RX ADMIN — KETOROLAC TROMETHAMINE 60 MG: 30 INJECTION, SOLUTION INTRAMUSCULAR; INTRAVENOUS at 09:04

## 2025-04-23 RX ADMIN — DEXAMETHASONE SODIUM PHOSPHATE 4 MG: 4 INJECTION, SOLUTION INTRA-ARTICULAR; INTRALESIONAL; INTRAMUSCULAR; INTRAVENOUS; SOFT TISSUE at 09:04

## 2025-04-23 RX ADMIN — METHYLPREDNISOLONE ACETATE 40 MG: 40 INJECTION, SUSPENSION INTRA-ARTICULAR; INTRALESIONAL; INTRAMUSCULAR; SOFT TISSUE at 09:04

## 2025-04-23 NOTE — PROGRESS NOTES
Alvina Shi DNP   [address] [place] [address]     PATIENT NAME: Maury Mckenzie  : 1975  DATE: 25  MRN: [id number]      Billing Provider: Alvina Shi DNP  Level of Service: [place] OFFICE/OUTPT VISIT, EST, LEVL III, 20-29 MIN  Patient PCP Information       Provider PCP Type    Alvina Shi DNP General            Reason for Visit / Chief Complaint: Knee Pain (Left knee pain.) and Medication Refill       Update PCP  Update Chief Complaint         History of Present Illness / Problem Focused Workflow     Maury Mckenzie presents to the clinic with Knee Pain (Left knee pain.) and Medication Refill     Knee Pain     Medication Refill    Review of Systems     Review of Systems     Medical / Social / Family History     Past Medical History:   Diagnosis Date    Carpal tunnel syndrome     Hyperlipidemia     Hypertension     Hypothyroidism     IBS (irritable bowel syndrome)        Past Surgical History:   Procedure Laterality Date    CARDIAC CATHETERIZATION      Dr. Beatty    CARPAL TUNNEL RELEASE Left     Dr. Byers     SECTION  1996    x 1    HYSTERECTOMY      LASER ABLATION Left 2019    Left GSV Laser Ablation performed by Dr. Juan Yang    LASER ABLATION Left 09/10/2021    Procedure: Left distal GSV Remnant Laser Ablation;  Surgeon: Ed Yang DO;  Location: [facility];  Service: Vascular;  Laterality: Left;    Left Ilioinguinal NB Left     DR MCPHERSON    OOPHORECTOMY      TOTAL ABDOMINAL HYSTERECTOMY      Dr. Christine       Social History  Ms.  reports that she has never smoked. She has never been exposed to tobacco smoke. She has never used smokeless tobacco. She reports that she does not currently use alcohol. She reports that she does not use drugs.    Family History  Ms.'s family history includes Hypertension in her mother; No Known Problems in her brother, father, and sister.    Medications and Allergies     Medications  No  "outpatient medications have been marked as taking for the 4/23/25 encounter (Office Visit) with Alvina Shi DNP.     Current Facility-Administered Medications for the 4/23/25 encounter (Office Visit) with Alvina Shi DNP   Medication Dose Route Frequency Provider Last Rate Last Admin    [COMPLETED] dexAMETHasone injection 4 mg  4 mg Intramuscular 1 time in Clinic/HOD Alvina Shi DNP   4 mg at 04/23/25 0927    [COMPLETED] ketorolac injection 60 mg  60 mg Intramuscular 1 time in Clinic/HOD Alvina Shi DNP   60 mg at 04/23/25 0928    [COMPLETED] methylPREDNISolone acetate injection 40 mg  40 mg Intramuscular 1 time in Clinic/HOD Alvina Shi DNP   40 mg at 04/23/25 0928       Allergies  Review of patient's allergies indicates:  No Known Allergies    Physical Examination   BP (!) 140/83 (BP Location: Left arm, Patient Position: Sitting)   Pulse 78   Temp 98.1 °F (36.7 °C)   Resp 20   Ht 5' 5" (1.651 m)   Wt 114.8 kg (253 lb)   SpO2 100%   BMI 42.10 kg/m²    Physical Exam  Vitals and nursing note reviewed.   Constitutional:       Appearance: Normal appearance. She is obese.   HENT:      Head: Normocephalic.      Nose: Nose normal.      Mouth/Throat:      Mouth: Mucous membranes are moist.   Eyes:      Extraocular Movements: Extraocular movements intact.      Conjunctiva/sclera: Conjunctivae normal.      Pupils: Pupils are equal, round, and reactive to light.   Cardiovascular:      Rate and Rhythm: Normal rate and regular rhythm.      Pulses: Normal pulses.      Heart sounds: Normal heart sounds.   Pulmonary:      Effort: Pulmonary effort is normal.      Breath sounds: Normal breath sounds.   Abdominal:      General: Abdomen is flat. Bowel sounds are decreased.      Palpations: Abdomen is soft.   Musculoskeletal:         General: Swelling and tenderness present.      Cervical back: Normal range of motion and neck supple.      Comments: Erythematous tenderness, warmth and pain " with ROM to left knee   Skin:     General: Skin is warm and dry.      Capillary Refill: Capillary refill takes less than 2 seconds.   Neurological:      General: No focal deficit present.      Mental Status: She is alert and oriented to person, place, and time. Mental status is at baseline.   Psychiatric:         Mood and Affect: Mood normal.         Behavior: Behavior normal.         Thought Content: Thought content normal.         Judgment: Judgment normal.        Assessment and Plan (including Health Maintenance)      Problem List  Smart Sets  Document Outside HM   :    Plan:         Health Maintenance Due   Topic Date Due    Pneumococcal Vaccines (Age 50+) (1 of 1 - PCV) Never done    Colorectal Cancer Screening  07/10/2025       Problem List Items Addressed This Visit          Orthopedic    Acute gout of left knee    Pain and swelling of left knee - Primary    Relevant Medications    methylPREDNISolone acetate injection 40 mg (Completed)    dexAMETHasone injection 4 mg (Completed)    ketorolac injection 60 mg (Completed)       Health Maintenance Topics with due status: Not Due       Topic Last Completion Date    TETANUS VACCINE 03/06/2023    Mammogram 09/26/2024    Hemoglobin A1c (Prediabetes) 11/01/2024    Lipid Panel 11/01/2024    RSV Vaccine (Age 60+ and Pregnant patients) Not Due       Future Appointments   Date Time Provider Department Center   5/7/2025  9:15 AM Alvina Shi DNP [facility]   5/7/2025 11:00 AM [facility] US1 [facility]BH USIC [facility]   5/19/2025  2:40 PM [facility] MAMMO1 [facility] MAMMO [facility]   5/19/2025  3:00 PM [facility] US2 [facility] US [facility]   6/3/2025 11:15 AM Ed Yang DO RMOBC VEIN [facility]   7/10/2025  7:00 AM [facility] GI ROOM 01 RASCH ENDO [facility]   10/2/2025 10:20 AM [facility] MAMMO1 RMOB MMIC [facility]            Signature:  Alvina Shi DNP      [address] [place] [address]    Date of encounter: 4/23/25

## 2025-05-07 ENCOUNTER — HOSPITAL ENCOUNTER (OUTPATIENT)
Dept: RADIOLOGY | Facility: HOSPITAL | Age: 50
Discharge: HOME | End: 2025-05-07
Attending: NURSE PRACTITIONER
Payer: MEDICAID

## 2025-05-07 DIAGNOSIS — S80.11XA HEMATOMA OF RIGHT LOWER LEG: ICD-10-CM

## 2025-05-07 DIAGNOSIS — M79.89 PAIN AND SWELLING OF RIGHT LOWER LEG: ICD-10-CM
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
## 2025-05-07 DIAGNOSIS — M79.661 PAIN AND SWELLING OF RIGHT LOWER LEG: ICD-10-CM

## 2025-05-07 PROCEDURE — 93971 EXTREMITY STUDY: CPT | Mod: 26,RT,, | Performed by: STUDENT IN AN ORGANIZED HEALTH CARE EDUCATION/TRAINING PROGRAM

## 2025-05-07 PROCEDURE — 93971 EXTREMITY STUDY: CPT | Mod: TC,RT

## 2025-05-19 ENCOUNTER — HOSPITAL ENCOUNTER (OUTPATIENT)
Dept: RADIOLOGY | Facility: HOSPITAL | Age: 50
Discharge: HOME | End: 2025-05-19
Attending: NURSE PRACTITIONER
Payer: MEDICAID

## 2025-05-19 DIAGNOSIS — N64.4 BREAST TENDERNESS IN FEMALE: ICD-10-CM

## 2025-05-19 DIAGNOSIS — N62 BREAST HYPERTROPHY: ICD-10-CM

## 2025-05-19 PROCEDURE — 77066 DX MAMMO INCL CAD BI: CPT | Mod: TC

## 2025-05-19 PROCEDURE — 77062 BREAST TOMOSYNTHESIS BI: CPT | Mod: 26,,, | Performed by: RADIOLOGY

## 2025-05-19 PROCEDURE — 77066 DX MAMMO INCL CAD BI: CPT | Mod: 26,,, | Performed by: RADIOLOGY

## 2025-05-19 PROCEDURE — 76641 ULTRASOUND BREAST COMPLETE: CPT | Mod: TC,50

## 2025-06-23 RX ORDER — LINACLOTIDE 290 UG/1
290 CAPSULE, GELATIN COATED ORAL
Qty: 30 CAPSULE | Refills: 1 | Status: SHIPPED | OUTPATIENT
Start: 2025-06-23

## 2025-06-25 ENCOUNTER — TELEPHONE (OUTPATIENT)
Dept: GASTROENTEROLOGY | Facility: CLINIC | Age: 50
End: 2025-06-25
Payer: MEDICAID

## 2025-06-26 ENCOUNTER — TELEPHONE (OUTPATIENT)
Dept: GASTROENTEROLOGY | Facility: HOSPITAL | Age: 50
End: 2025-06-26
Payer: MEDICAID

## 2025-06-26 DIAGNOSIS — Z12.11 COLON CANCER SCREENING: Primary | ICD-10-CM

## 2025-06-27 RX ORDER — POLYETHYLENE GLYCOL 3350, SODIUM SULFATE ANHYDROUS, SODIUM BICARBONATE, SODIUM CHLORIDE, POTASSIUM CHLORIDE 236; 22.74; 6.74; 5.86; 2.97 G/4L; G/4L; G/4L; G/4L; G/4L
4 POWDER, FOR SOLUTION ORAL ONCE
Qty: 4000 ML | Refills: 0 | Status: SHIPPED | OUTPATIENT
Start: 2025-06-27 | End: 2025-06-27

## 2025-07-10 ENCOUNTER — ANESTHESIA (OUTPATIENT)
Dept: GASTROENTEROLOGY | Facility: HOSPITAL | Age: 50
End: 2025-07-10
Payer: MEDICAID

## 2025-07-10 ENCOUNTER — ANESTHESIA EVENT (OUTPATIENT)
Dept: GASTROENTEROLOGY | Facility: HOSPITAL | Age: 50
End: 2025-07-10
Payer: MEDICAID

## 2025-07-10 ENCOUNTER — HOSPITAL ENCOUNTER (OUTPATIENT)
Dept: GASTROENTEROLOGY | Facility: HOSPITAL | Age: 50
Discharge: HOME | End: 2025-07-10
Attending: INTERNAL MEDICINE | Admitting: INTERNAL MEDICINE
Payer: MEDICAID

## 2025-07-10 VITALS
BODY MASS INDEX: 38.32 KG/M2 | RESPIRATION RATE: 14 BRPM | DIASTOLIC BLOOD PRESSURE: 76 MMHG | HEIGHT: 65 IN | OXYGEN SATURATION: 100 % | HEART RATE: 69 BPM | WEIGHT: 230 LBS | SYSTOLIC BLOOD PRESSURE: 142 MMHG | TEMPERATURE: 97 F

## 2025-07-10 DIAGNOSIS — Z12.11 COLON CANCER SCREENING: ICD-10-CM

## 2025-07-10 PROCEDURE — 37000008 HC ANESTHESIA 1ST 15 MINUTES

## 2025-07-10 PROCEDURE — 63600175 PHARM REV CODE 636 W HCPCS

## 2025-07-10 PROCEDURE — 88305 TISSUE EXAM BY PATHOLOGIST: CPT | Mod: TC,SUR | Performed by: INTERNAL MEDICINE

## 2025-07-10 PROCEDURE — 25000003 PHARM REV CODE 250

## 2025-07-10 PROCEDURE — 37000009 HC ANESTHESIA EA ADD 15 MINS

## 2025-07-10 PROCEDURE — 27000284 HC CANNULA NASAL

## 2025-07-10 RX ORDER — LIDOCAINE HYDROCHLORIDE 20 MG/ML
INJECTION, SOLUTION EPIDURAL; INFILTRATION; INTRACAUDAL; PERINEURAL
Status: DISCONTINUED | OUTPATIENT
Start: 2025-07-10 | End: 2025-07-10

## 2025-07-10 RX ORDER — PROPOFOL 10 MG/ML
VIAL (ML) INTRAVENOUS
Status: DISCONTINUED | OUTPATIENT
Start: 2025-07-10 | End: 2025-07-10

## 2025-07-10 RX ORDER — SODIUM CHLORIDE 0.9 % (FLUSH) 0.9 %
10 SYRINGE (ML) INJECTION
Status: DISCONTINUED | OUTPATIENT
Start: 2025-07-10 | End: 2025-07-11 | Stop reason: HOSPADM

## 2025-07-10 RX ADMIN — PROPOFOL 50 MG: 10 INJECTION, EMULSION INTRAVENOUS at 09:07

## 2025-07-10 RX ADMIN — PROPOFOL 50 MG: 10 INJECTION, EMULSION INTRAVENOUS at 08:07

## 2025-07-10 RX ADMIN — PROPOFOL 20 MG: 10 INJECTION, EMULSION INTRAVENOUS at 09:07

## 2025-07-10 RX ADMIN — SODIUM CHLORIDE: 9 INJECTION, SOLUTION INTRAVENOUS at 08:07

## 2025-07-10 RX ADMIN — PROPOFOL 30 MG: 10 INJECTION, EMULSION INTRAVENOUS at 09:07

## 2025-07-10 RX ADMIN — PROPOFOL 20 MG: 10 INJECTION, EMULSION INTRAVENOUS at 08:07

## 2025-07-10 RX ADMIN — LIDOCAINE HYDROCHLORIDE 50 MG: 20 INJECTION, SOLUTION EPIDURAL; INFILTRATION; INTRACAUDAL; PERINEURAL at 08:07

## 2025-07-10 NOTE — H&P
Gastroenterology Pre-procedure H&P    History of Present Illness    Maury Mckenzie is a 49 y.o. female that  has a past medical history of Carpal tunnel syndrome, Hyperlipidemia, Hypertension, Hypothyroidism, and IBS (irritable bowel syndrome).     Patient here for routine screening     Patient denies wt loss, abdominal pain, diarrhea, melena/hematochezia, change in stool caliber, no anticoagulants, FMHx of GI related malignancies.      Past Medical History:   Diagnosis Date    Carpal tunnel syndrome     Hyperlipidemia     Hypertension     Hypothyroidism     IBS (irritable bowel syndrome)        Past Surgical History:   Procedure Laterality Date    CARDIAC CATHETERIZATION      Dr. Beatty     CARPAL TUNNEL RELEASE Left     Dr. Byers     SECTION  1996    x 1    HYSTERECTOMY      LASER ABLATION Left 2019    Left GSV Laser Ablation performed by Dr. Juan Yang    LASER ABLATION Left 09/10/2021    Procedure: Left distal GSV Remnant Laser Ablation;  Surgeon: Ed Yang DO;  Location: [facility];  Service: Vascular;  Laterality: Left;    Left Ilioinguinal NB Left     DR MCPHERSON    OOPHORECTOMY      TOTAL ABDOMINAL HYSTERECTOMY      Dr. Christine       Family History   Problem Relation Name Age of Onset    Hypertension Mother      No Known Problems Father      No Known Problems Sister          2 Living Ages 40 and 22    No Known Problems Brother          4 Living Ages 50, 46, unknown ages on other 2        Social History     Socioeconomic History    Marital status: Single   Tobacco Use    Smoking status: Never     Passive exposure: Never    Smokeless tobacco: Never   Substance and Sexual Activity    Alcohol use: Not Currently    Drug use: Never       Current Medications[1]    Review of patient's allergies indicates:  No Known Allergies    Objective:  Vitals:    07/10/25 0743   BP: (!) 174/78   Pulse: 85   Resp: 19   Temp: 97.7 °F (36.5 °C)   TempSrc: Oral   SpO2: 97%   Weight: 104.3 kg  "(230 lb)   Height: 5' 5" (1.651 m)        GEN: normal appearing, NAD, AAO x3  HENT: NCAT, anicteric, OP benign  CV: normal rate, regular rhythm  RESP: NABS, symmetric rise, unlabored  ABD: soft, ND, no guarding or TTP  SKIN: warm and dry  NEURO: grossly afocal    Assessment and Plan:    Proceed with:    Colonoscopy for screening for colon cancer    Govind Akhtar MD  Gastroenterology       [1]   Current Outpatient Medications   Medication Sig Dispense Refill    aspirin (ECOTRIN) 81 MG EC tablet Take 1 tablet (81 mg total) by mouth once daily. 90 tablet 1    cloNIDine (CATAPRES) 0.2 MG tablet Take 1 tablet (0.2 mg total) by mouth every evening. 90 tablet 1    colchicine (COLCRYS) 0.6 mg tablet 2 tablets by mouth on day 1, then daily x1 week 8 tablet 0    diclofenac (CATAFLAM) 50 MG tablet Take 1 tablet (50 mg total) by mouth 2 (two) times daily. 60 tablet 5    diclofenac (VOLTAREN) 50 MG EC tablet Take 1 tablet (50 mg total) by mouth 2 (two) times daily. 60 tablet 1    ergocalciferol (ERGOCALCIFEROL) 50,000 unit Cap Take 1 capsule (50,000 Units total) by mouth every 7 days. 12 capsule 1    furosemide (LASIX) 20 MG tablet Take 1 tablet (20 mg total) by mouth once daily. 90 tablet 1    levothyroxine (SYNTHROID) 25 MCG tablet Take 1 tablet (25 mcg total) by mouth before breakfast. 90 tablet 1    LINZESS 290 mcg Cap capsule TAKE 1 CAPSULE (290 MCG TOTAL) BY MOUTH ONCE DAILY. 30 capsule 1    olmesartan (BENICAR) 20 MG tablet Take 1 tablet (20 mg total) by mouth once daily. 90 tablet 1    pravastatin (PRAVACHOL) 40 MG tablet Take 1 tablet (40 mg total) by mouth every evening. 90 tablet 1    tiZANidine (ZANAFLEX) 4 MG tablet Take 1 tablet (4 mg total) by mouth nightly as needed (muscle spasms). 90 tablet 1    tiZANidine (ZANAFLEX) 4 MG tablet Take 1 tablet (4 mg total) by mouth nightly as needed (for muscle relaxer). 20 tablet 0    azithromycin (Z-CARIE) 250 MG tablet Take 2 tablets by mouth on day 1; Take 1 tablet by mouth " on days 2-5 (Patient not taking: Reported on 4/11/2025) 6 tablet 0    methylPREDNISolone (MEDROL DOSEPACK) 4 mg tablet use as directed (Patient not taking: Reported on 4/11/2025) 21 tablet 0    methylPREDNISolone (MEDROL DOSEPACK) 4 mg tablet use as directed 21 tablet 0     No current facility-administered medications for this encounter.

## 2025-07-10 NOTE — DISCHARGE INSTRUCTIONS
Procedure Date  7/10/25     Impression  Overall Impression:   2 serrated polyps in the descending colon were removed with cold forceps biopsy  Severe scattered diverticulosis  The ileocecal valve, appendiceal orifice and cecum appeared normal.  Internal hemorrhoids        Recommendation  Await pathology results  Start a daily fiber supplement with Citrucel or Fibercon (can purchase at your local pharmacy)  Use Miralax 17 grams daily-to-twice daily as needed to have a regular, soft BM without straining  Drink at least 60-80 ounces of water daily unless you have a medical condition that requires fluid restriction   Repeat colonoscopy in 7 years        DO NOT DRIVE FOR 24 HOURS OR OPERATE HEAVY MACHINERY.   DO NOT SIGN LEGAL DOCUMENTS.  DRINK PLENTY OF FLUIDS. RESUME DIET.

## 2025-07-10 NOTE — ANESTHESIA POSTPROCEDURE EVALUATION
Anesthesia Post Evaluation    Patient: Maury Mckenzie    Procedure(s) Performed: * Colonoscopy     Final Anesthesia Type: MAC      Patient location during evaluation: GI PACU  Patient participation: Yes- Able to Participate  Level of consciousness: awake and alert  Post-procedure vital signs: reviewed and stable  Pain management: adequate  Airway patency: patent    PONV status at discharge: No PONV  Anesthetic complications: no      Cardiovascular status: blood pressure returned to baseline and hemodynamically stable  Respiratory status: unassisted, spontaneous ventilation and room air  Hydration status: euvolemic  Follow-up not needed.  Comments: Refer to nursing notes for pain/tesha score upon discharge from recovery.              Vitals Value Taken Time   /61 07/10/25 09:44   Temp 98 F 07/10/25 09:46   Pulse 70 07/10/25 09:45   Resp 13 07/10/25 09:45   SpO2 100 % 07/10/25 09:45   Vitals shown include unfiled device data.      No case tracking events are documented in the log.      Pain/Tesha Score: Tesha Score: 8 (7/10/2025  9:27 AM)

## 2025-07-10 NOTE — TRANSFER OF CARE
"Anesthesia Transfer of Care Note    Patient: Maury Mckenzie    Procedure(s) Performed: * Colonoscopy  *    Patient location: GI    Anesthesia Type: MAC    Transport from OR: Transported from OR on room air with adequate spontaneous ventilation. Continuous ECG monitoring in transport. Continuous SpO2 monitoring in transport    Post pain: adequate analgesia    Post assessment: no apparent anesthetic complications    Post vital signs: stable    Level of consciousness: sedated and responds to stimulation    Nausea/Vomiting: no nausea/vomiting    Complications: none    Transfer of care protocol was followedComments: Good SV continue, NAD, VSS, RTRN      Last vitals: Visit Vitals  BP (!) 126/54 (BP Location: Right arm, Patient Position: Lying)   Pulse 76   Temp 36.1 °C (97 °F) (Skin)   Resp 15   Ht 5' 5" (1.651 m)   Wt 104.3 kg (230 lb)   SpO2 100%   Breastfeeding No   BMI 38.27 kg/m²     "

## 2025-07-10 NOTE — ANESTHESIA PREPROCEDURE EVALUATION
07/10/2025  Maury Mckenzie is a 49 y.o., female.    Outpatient Medications as of 7/10/2025   Medication Sig Dispense Refill    aspirin (ECOTRIN) 81 MG EC tablet Take 1 tablet (81 mg total) by mouth once daily. 90 tablet 1    cloNIDine (CATAPRES) 0.2 MG tablet Take 1 tablet (0.2 mg total) by mouth every evening. 90 tablet 1    colchicine (COLCRYS) 0.6 mg tablet 2 tablets by mouth on day 1, then daily x1 week 8 tablet 0    diclofenac (CATAFLAM) 50 MG tablet Take 1 tablet (50 mg total) by mouth 2 (two) times daily. 60 tablet 5    diclofenac (VOLTAREN) 50 MG EC tablet Take 1 tablet (50 mg total) by mouth 2 (two) times daily. 60 tablet 1    ergocalciferol (ERGOCALCIFEROL) 50,000 unit Cap Take 1 capsule (50,000 Units total) by mouth every 7 days. 12 capsule 1    furosemide (LASIX) 20 MG tablet Take 1 tablet (20 mg total) by mouth once daily. 90 tablet 1    levothyroxine (SYNTHROID) 25 MCG tablet Take 1 tablet (25 mcg total) by mouth before breakfast. 90 tablet 1    LINZESS 290 mcg Cap capsule TAKE 1 CAPSULE (290 MCG TOTAL) BY MOUTH ONCE DAILY. 30 capsule 1    olmesartan (BENICAR) 20 MG tablet Take 1 tablet (20 mg total) by mouth once daily. 90 tablet 1    pravastatin (PRAVACHOL) 40 MG tablet Take 1 tablet (40 mg total) by mouth every evening. 90 tablet 1    tiZANidine (ZANAFLEX) 4 MG tablet Take 1 tablet (4 mg total) by mouth nightly as needed (muscle spasms). 90 tablet 1    tiZANidine (ZANAFLEX) 4 MG tablet Take 1 tablet (4 mg total) by mouth nightly as needed (for muscle relaxer). 20 tablet 0    azithromycin (Z-CARIE) 250 MG tablet Take 2 tablets by mouth on day 1; Take 1 tablet by mouth on days 2-5 (Patient not taking: Reported on 4/11/2025) 6 tablet 0    methylPREDNISolone (MEDROL DOSEPACK) 4 mg tablet use as directed (Patient not taking: Reported on 4/11/2025) 21 tablet 0    methylPREDNISolone (MEDROL DOSEPACK)  4 mg tablet use as directed 21 tablet 0     No current facility-administered medications on file as of 7/10/2025.     Active Ambulatory Problems     Diagnosis Date Noted    Venous insufficiency 07/07/2021    Hypertension 09/01/2021    Hypothyroidism 09/01/2021    Hyperlipidemia 09/01/2021    BMI 38.0-38.9,adult 09/07/2022    Vitamin D deficiency 09/14/2022    Pain and swelling of left knee 09/14/2023    Primary osteoarthritis of left knee 11/15/2023    Chronic bilateral low back pain with left-sided sciatica 04/08/2024    Hyperparathyroidism 04/08/2024    Acute gout of left knee 04/23/2025     Resolved Ambulatory Problems     Diagnosis Date Noted    Edema, lower extremity 07/07/2021    Other skin changes 07/07/2021    Pain of left lower extremity 07/07/2021    Edema 08/09/2021    Arthritis 09/01/2021    Venous insufficiency (chronic) (peripheral) 09/10/2021    Chest wall muscle strain 11/16/2021    Rib pain on left side 11/16/2021    Abdominal pain 11/16/2021    Constipation 12/06/2021    Screening for viral disease 12/06/2021    Hyperglycemia 12/06/2021    Myalgia 12/29/2021    COVID-19 12/30/2021    Cough 12/30/2021    Pain in left foot 03/07/2022    Acute non-recurrent maxillary sinusitis 04/05/2022    Acute otalgia, bilateral 04/05/2022    Vaginal discharge 09/07/2022    Abdominal wall abscess 03/06/2023    On long term drug therapy 03/07/2023    Screening for colon cancer 03/07/2023    Sinusitis 04/20/2023    Abscess of buttock, right 05/23/2023    Localized swelling of left foot 05/23/2023    Sprain of left ankle 06/05/2023    Acute left ankle pain 06/05/2023    Olecranon bursitis of left elbow 07/19/2023    Left elbow pain 07/19/2023    Bronchitis 09/06/2023    Left hip pain 04/08/2024    Need for vaccination 11/01/2024    Breast tenderness in female 02/05/2025    Breast hypertrophy 02/05/2025    Influenza 02/24/2025    Wheezing 02/24/2025    Fever 02/24/2025    Fall 04/11/2025    Hematoma of right lower leg  2025    Pain and swelling of right lower leg 2025     Past Medical History:   Diagnosis Date    Carpal tunnel syndrome     IBS (irritable bowel syndrome)      Past Surgical History:   Procedure Laterality Date    CARDIAC CATHETERIZATION      Dr. Beatty     CARPAL TUNNEL RELEASE Left     Dr. Byers     SECTION  1996    x 1    HYSTERECTOMY      LASER ABLATION Left 2019    Left GSV Laser Ablation performed by Dr. Juan Yang    LASER ABLATION Left 09/10/2021    Procedure: Left distal GSV Remnant Laser Ablation;  Surgeon: Ed Yang DO;  Location: [facility];  Service: Vascular;  Laterality: Left;    Left Ilioinguinal NB Left     DR MCPHERSON    OOPHORECTOMY      TOTAL ABDOMINAL HYSTERECTOMY      Dr. Christine       Pre-op Assessment    I have reviewed the Patient Summary Reports.     I have reviewed the Nursing Notes. I have reviewed the NPO Status.   I have reviewed the Medications.     Review of Systems  Anesthesia Hx:  No problems with previous Anesthesia             Denies Family Hx of Anesthesia complications.    Denies Personal Hx of Anesthesia complications.                    Social:  Non-Smoker, No Alcohol Use       Hematology/Oncology:  Hematology Normal   Oncology Normal                                   EENT/Dental:  EENT/Dental Normal           Cardiovascular:  Exercise tolerance: good   Hypertension           hyperlipidemia   ECG has been reviewed. Venous insufficiency                            Pulmonary:  Pulmonary Normal                       Renal/:  Renal/ Normal                 Hepatic/GI:  Hepatic/GI Normal Bowel Prep.                   Musculoskeletal:  Arthritis               Neurological:    Neuromuscular Disease,                                   Endocrine:   Hypothyroidism        Obesity / BMI > 30  Dermatological:  Skin Normal    Psych:  Psychiatric Normal                  Results for orders placed or performed in visit on 10/08/21   EKG 12-lead     Collection Time: 10/08/21  9:43 AM    Narrative    Test Reason : I10,    Vent. Rate : 073 BPM     Atrial Rate : 073 BPM     P-R Int : 234 ms          QRS Dur : 088 ms      QT Int : 370 ms       P-R-T Axes : 057 061 056 degrees     QTc Int : 407 ms    Sinus rhythm with 1st degree A-V block  Otherwise normal ECG  When compared with ECG of 08-OCT-2021 09:42,  No significant change was found  Confirmed by Deborah LACEY, Jf PATTON ([id number]) on 10/21/2021 4:50:25 PM    Referred By: KOREY QUEZADA           Confirmed By:Jf Cabrera MD         Physical Exam  General: Well nourished, Cooperative, Alert and Oriented    Airway:  Mallampati: III   Mouth Opening: Normal  TM Distance: Normal  Tongue: Normal  Neck ROM: Normal ROM    Dental:  Intact    Chest/Lungs:  Normal Respiratory Rate        Anesthesia Plan  Type of Anesthesia, risks & benefits discussed:    Anesthesia Type: MAC  Intra-op Monitoring Plan: Standard ASA Monitors  Post Op Pain Control Plan: multimodal analgesia  Induction:  IV  Informed Consent: Informed consent signed with the Patient and all parties understand the risks and agree with anesthesia plan.  All questions answered. Patient consented to blood products? Yes  ASA Score: 3  Day of Surgery Review of History & Physical: H&P Update referred to the surgeon/provider.I have interviewed and examined the patient. I have reviewed the patient's H&P dated: There are no significant changes.     Ready For Surgery From Anesthesia Perspective.     .

## 2025-07-11 LAB
ESTROGEN SERPL-MCNC: NORMAL PG/ML
INSULIN SERPL-ACNC: NORMAL U[IU]/ML
LAB AP GROSS DESCRIPTION: NORMAL
LAB AP LABORATORY NOTES: NORMAL
T3RU NFR SERPL: NORMAL %

## 2025-07-15 RX ORDER — DICLOFENAC SODIUM 50 MG/1
50 TABLET, DELAYED RELEASE ORAL 2 TIMES DAILY
Qty: 60 TABLET | Refills: 1 | Status: SHIPPED | OUTPATIENT
Start: 2025-07-15

## 2025-07-23 ENCOUNTER — OFFICE VISIT (OUTPATIENT)
Dept: VASCULAR SURGERY | Facility: CLINIC | Age: 50
End: 2025-07-23
Payer: MEDICAID

## 2025-07-23 VITALS
RESPIRATION RATE: 16 BRPM | DIASTOLIC BLOOD PRESSURE: 82 MMHG | HEIGHT: 65 IN | HEART RATE: 80 BPM | WEIGHT: 246 LBS | SYSTOLIC BLOOD PRESSURE: 131 MMHG | BODY MASS INDEX: 40.98 KG/M2

## 2025-07-23 DIAGNOSIS — M79.605 LEG PAIN, BILATERAL: ICD-10-CM

## 2025-07-23 DIAGNOSIS — I87.2 VENOUS INSUFFICIENCY (CHRONIC) (PERIPHERAL): Primary | ICD-10-CM

## 2025-07-23 DIAGNOSIS — R60.0 EDEMA, LOWER EXTREMITY: ICD-10-CM

## 2025-07-23 DIAGNOSIS — M79.604 LEG PAIN, BILATERAL: ICD-10-CM

## 2025-07-23 PROCEDURE — 99215 OFFICE O/P EST HI 40 MIN: CPT | Mod: PBBFAC | Performed by: FAMILY MEDICINE

## 2025-07-23 PROCEDURE — 99999 PR PBB SHADOW E&M-EST. PATIENT-LVL V: CPT | Mod: PBBFAC,,, | Performed by: FAMILY MEDICINE

## 2025-07-23 PROCEDURE — 99214 OFFICE O/P EST MOD 30 MIN: CPT | Mod: S$PBB,,, | Performed by: FAMILY MEDICINE

## 2025-07-23 PROCEDURE — 3008F BODY MASS INDEX DOCD: CPT | Mod: ,,, | Performed by: FAMILY MEDICINE

## 2025-07-23 NOTE — PROGRESS NOTES
[facility] CLINIC NOTE    Patient ID: Maury Mckenzie is a 49 y.o. female.    I. HISTORY     Chief Complaint:   Chief Complaint   Patient presents with    Follow-up     1y Venous , left leg tenderness        History of Present Illness    CHIEF COMPLAINT:  Patient presents today for follow up of leg pain and tenderness    BILATERAL LEG PAIN:  She reports ongoing bilateral leg pain, more pronounced in the LLE. LLE is tender to touch, particularly after previous surgery, with a varicose vein on the medial aspect causing soreness. She experiences difficulty with prolonged sitting and notes occasional tightness. Right leg has mild tenderness, particularly when leaning on certain areas, requiring positional adjustments to avoid pressure points. She denies significant swelling or severe pain in either leg.    MEDICAL HISTORY:  She has arthritis in the LLE and knee contributing to her discomfort.    CURRENT TREATMENT:  She wears compression socks most days of the week, occasionally missing 1-2 days. The socks sometimes feel tight but are generally tolerable.      ROS:  General: -fever, -chills, -fatigue, -weight gain, -weight loss  Eyes: -vision changes, -redness, -discharge  ENT: -ear pain, -nasal congestion, -sore throat  Cardiovascular: -chest pain, -palpitations, -lower extremity edema  Respiratory: -cough, -shortness of breath  Gastrointestinal: -abdominal pain, -nausea, -vomiting, -diarrhea, -constipation, -blood in stool  Genitourinary: -dysuria, -hematuria, -frequency  Musculoskeletal: +joint pain, -muscle pain, +limb pain, +pain with movement, +joint swelling  Skin: -rash, -lesion  Neurological: -headache, -dizziness, -numbness, -tingling  Psychiatric: -anxiety, -depression, -sleep difficulty         Clinical summary :  She underwent a previous CTV on 12/21/2021.  This study shows mild-to-moderate nonspecific narrowing of the bilateral deep veins at the common femoral level which is nonspecific.  No evidence of  DVT.  Superficial varices of the bilateral thighs.  Fat containing umbilical hernia, prior hysterectomy, colonic diverticulosis and scattered skeletal degenerative changes.    The patient underwent a left complete venous reflux study 2022 and the results were discussed with the patient.  This study shows no evidence of DVT in the left lower extremity.  The left great saphenous vein appears to be ablated from the proximal thigh down to the mid calf.  No other superficial venous reflux noted.  Posterior lateral varicosities noted within the calf and knee.    Left distal GSV remnant ablation 9-10-21    Past Medical History:   Diagnosis Date    Carpal tunnel syndrome     Hyperlipidemia     Hypertension     Hypothyroidism     IBS (irritable bowel syndrome)         Past Surgical History:   Procedure Laterality Date    CARDIAC CATHETERIZATION      Dr. Beatty 2012    CARPAL TUNNEL RELEASE Left     Dr. Byers     SECTION  1996    x 1    HYSTERECTOMY      LASER ABLATION Left 2019    Left GSV Laser Ablation performed by Dr. Juan Yang    LASER ABLATION Left 09/10/2021    Procedure: Left distal GSV Remnant Laser Ablation;  Surgeon: Ed Yang DO;  Location: [facility];  Service: Vascular;  Laterality: Left;    Left Ilioinguinal NB Left     DR MCPHERSON    OOPHORECTOMY      TOTAL ABDOMINAL HYSTERECTOMY      Dr. Christine       Social History     Tobacco Use   Smoking Status Never    Passive exposure: Never   Smokeless Tobacco Never         Current Outpatient Medications:     aspirin (ECOTRIN) 81 MG EC tablet, Take 1 tablet (81 mg total) by mouth once daily., Disp: 90 tablet, Rfl: 1    azithromycin (Z-CARIE) 250 MG tablet, Take 2 tablets by mouth on day 1; Take 1 tablet by mouth on days 2-5 (Patient not taking: Reported on 2025), Disp: 6 tablet, Rfl: 0    cloNIDine (CATAPRES) 0.2 MG tablet, Take 1 tablet (0.2 mg total) by mouth every evening., Disp: 90 tablet, Rfl: 1    colchicine (COLCRYS)  0.6 mg tablet, 2 tablets by mouth on day 1, then daily x1 week, Disp: 8 tablet, Rfl: 0    diclofenac (CATAFLAM) 50 MG tablet, Take 1 tablet (50 mg total) by mouth 2 (two) times daily., Disp: 60 tablet, Rfl: 5    diclofenac (VOLTAREN) 50 MG EC tablet, TAKE 1 TABLET (50 MG TOTAL) BY MOUTH 2 (TWO) TIMES DAILY., Disp: 60 tablet, Rfl: 1    ergocalciferol (ERGOCALCIFEROL) 50,000 unit Cap, Take 1 capsule (50,000 Units total) by mouth every 7 days., Disp: 12 capsule, Rfl: 1    furosemide (LASIX) 20 MG tablet, Take 1 tablet (20 mg total) by mouth once daily., Disp: 90 tablet, Rfl: 1    levothyroxine (SYNTHROID) 25 MCG tablet, Take 1 tablet (25 mcg total) by mouth before breakfast., Disp: 90 tablet, Rfl: 1    LINZESS 290 mcg Cap capsule, TAKE 1 CAPSULE (290 MCG TOTAL) BY MOUTH ONCE DAILY., Disp: 30 capsule, Rfl: 1    methylPREDNISolone (MEDROL DOSEPACK) 4 mg tablet, use as directed (Patient not taking: Reported on 4/11/2025), Disp: 21 tablet, Rfl: 0    methylPREDNISolone (MEDROL DOSEPACK) 4 mg tablet, use as directed, Disp: 21 tablet, Rfl: 0    olmesartan (BENICAR) 20 MG tablet, Take 1 tablet (20 mg total) by mouth once daily., Disp: 90 tablet, Rfl: 1    pravastatin (PRAVACHOL) 40 MG tablet, Take 1 tablet (40 mg total) by mouth every evening., Disp: 90 tablet, Rfl: 1    tiZANidine (ZANAFLEX) 4 MG tablet, Take 1 tablet (4 mg total) by mouth nightly as needed (muscle spasms)., Disp: 90 tablet, Rfl: 1    tiZANidine (ZANAFLEX) 4 MG tablet, Take 1 tablet (4 mg total) by mouth nightly as needed (for muscle relaxer)., Disp: 20 tablet, Rfl: 0    Review of Systems   Constitutional:  Negative for fatigue and fever.   Eyes:  Negative for pain.   Respiratory:  Negative for chest tightness and shortness of breath.    Cardiovascular:  Positive for leg swelling. Negative for chest pain.   Gastrointestinal:  Negative for abdominal pain.   Musculoskeletal:  Positive for leg pain.        Chronic darker skin changes bilaterally   Neurological:   Negative for syncope.   Psychiatric/Behavioral:  Negative for sleep disturbance.           II. PHYSICAL EXAM     Physical Exam  Vitals reviewed.   Constitutional:       General: She is not in acute distress.     Appearance: She is obese.   HENT:      Head: Normocephalic.   Eyes:      Pupils: Pupils are equal, round, and reactive to light.   Cardiovascular:      Rate and Rhythm: Normal rate and regular rhythm.   Pulmonary:      Effort: Pulmonary effort is normal.   Abdominal:      Palpations: Abdomen is soft.   Musculoskeletal:         General: No swelling. Normal range of motion.      Right lower leg: No edema.      Left lower leg: Edema present.      Comments: Tenderness to palpation along the left medial joint line and the tissues surrounding.  Crepitus with flexion-extension under patella.    Pain to the lateral aspect of the left foot with inversion.  No noted deformity or swelling.   Skin:     General: Skin is warm and dry.   Neurological:      Mental Status: She is alert and oriented to person, place, and time.       III. ASSESSMENT & PLAN (MEDICAL DECISION MAKING)     1. Venous insufficiency (chronic) (peripheral)    2. Edema, lower extremity    3. Leg pain, bilateral            Assessment & Plan    I87.2 Venous insufficiency (chronic) (peripheral)  R60.0 Edema, lower extremity  M79.604, M79.605 Leg pain, bilateral    VENOUS INSUFFICIENCY (CHRONIC) (PERIPHERAL):  - Consider potential interventions based on US findings.  - Ordered US Legs to assess current status of veins and potential valve failure.  - Follow up after US appointment for review of results and discussion of potential interventions.    EDEMA, LOWER EXTREMITY:  - Patient to continue wearing compression socks regularly.    LEG PAIN, BILATERAL:  - Tenderness in legs persists despite regular use of compression socks.         Ed Yang, DO    This note was generated with the assistance of ambient listening technology. Verbal consent was  obtained by the patient and accompanying visitor(s) for the recording of patient appointment to facilitate this note. I attest to having reviewed and edited the generated note for accuracy, though some syntax or spelling errors may persist. Please contact the author of this note for any clarification.

## 2025-08-22 DIAGNOSIS — E78.5 HYPERLIPIDEMIA, UNSPECIFIED HYPERLIPIDEMIA TYPE: ICD-10-CM

## 2025-08-22 DIAGNOSIS — I10 HYPERTENSION, UNSPECIFIED TYPE: ICD-10-CM

## 2025-08-22 RX ORDER — FUROSEMIDE 20 MG/1
20 TABLET ORAL DAILY
Qty: 90 TABLET | Refills: 1 | Status: SHIPPED | OUTPATIENT
Start: 2025-08-22

## 2025-08-22 RX ORDER — PRAVASTATIN SODIUM 40 MG/1
40 TABLET ORAL NIGHTLY
Qty: 90 TABLET | Refills: 1 | Status: SHIPPED | OUTPATIENT
Start: 2025-08-22

## (undated) DEVICE — KIT MICROINTRODUCER 5F

## (undated) DEVICE — NEEDLE ECLIPSE 18GX1 1/2 SAFETY

## (undated) DEVICE — Device

## (undated) DEVICE — WRAP COBAN SELF ADHESIVE 4INX5 YD (STERILE)

## (undated) DEVICE — CDS EVLT

## (undated) DEVICE — BANDAGE KERLIX AMD  4.5IN  SPONGE ROLL

## (undated) DEVICE — GLOVE SURGICAL PROTEXIS PI SIZE 6

## (undated) DEVICE — GLOVE SURGICAL PROTEXIS PI CLASSIC SIZE 7.5

## (undated) DEVICE — COVER LIGHT HANDLE FLEX PK/2

## (undated) DEVICE — FIBER REUSABLE 600 UM (5 USES)

## (undated) DEVICE — COVER SNAP KAPS 26INCH DEPTH

## (undated) DEVICE — APPLICATOR CHLORAPREP HI-LITE TINTED ORANGE 26ML

## (undated) DEVICE — BANDAGE ELASTIC 4 X 5.8YD SELF CLOSURE NON-STERILE"

## (undated) DEVICE — GOWN SURGICAL SMARTGOWN LEVEL 4 / EXTRA LARGE STERILE